# Patient Record
Sex: MALE | Race: WHITE | NOT HISPANIC OR LATINO | Employment: OTHER | URBAN - METROPOLITAN AREA
[De-identification: names, ages, dates, MRNs, and addresses within clinical notes are randomized per-mention and may not be internally consistent; named-entity substitution may affect disease eponyms.]

---

## 2017-02-14 ENCOUNTER — TELEPHONE (OUTPATIENT)
Dept: FAMILY MEDICINE | Facility: CLINIC | Age: 67
End: 2017-02-14

## 2017-02-14 NOTE — TELEPHONE ENCOUNTER
----- Message from Kenny Brannon sent at 2/14/2017  9:58 AM CST -----  Contact: pt's spouse  She's calling in regards to being worked into the dr's schedule on today, please advise, [phone] (cell)

## 2017-02-14 NOTE — TELEPHONE ENCOUNTER
Spoke to patient's wife and advised there are no available appointments today. He will come in tomorrow to see Dr. Colon.

## 2017-02-15 ENCOUNTER — OFFICE VISIT (OUTPATIENT)
Dept: FAMILY MEDICINE | Facility: CLINIC | Age: 67
End: 2017-02-15
Payer: COMMERCIAL

## 2017-02-15 VITALS
SYSTOLIC BLOOD PRESSURE: 107 MMHG | BODY MASS INDEX: 42.54 KG/M2 | TEMPERATURE: 99 F | HEART RATE: 55 BPM | HEIGHT: 69 IN | WEIGHT: 287.25 LBS | DIASTOLIC BLOOD PRESSURE: 59 MMHG

## 2017-02-15 DIAGNOSIS — Z86.010 HISTORY OF COLON POLYPS: ICD-10-CM

## 2017-02-15 DIAGNOSIS — J20.9 BRONCHITIS WITH BRONCHOSPASM: Primary | ICD-10-CM

## 2017-02-15 PROCEDURE — 99213 OFFICE O/P EST LOW 20 MIN: CPT | Mod: S$GLB,,, | Performed by: FAMILY MEDICINE

## 2017-02-15 PROCEDURE — 99999 PR PBB SHADOW E&M-EST. PATIENT-LVL III: CPT | Mod: PBBFAC,,, | Performed by: FAMILY MEDICINE

## 2017-02-15 PROCEDURE — 99213 OFFICE O/P EST LOW 20 MIN: CPT | Mod: PBBFAC,PO | Performed by: FAMILY MEDICINE

## 2017-02-15 RX ORDER — INSULIN GLARGINE 100 [IU]/ML
INJECTION, SOLUTION SUBCUTANEOUS
Qty: 45 ML | Refills: 3 | Status: SHIPPED | OUTPATIENT
Start: 2017-02-15 | End: 2017-02-16 | Stop reason: SDUPTHER

## 2017-02-15 RX ORDER — INSULIN GLARGINE 100 [IU]/ML
INJECTION, SOLUTION SUBCUTANEOUS
Qty: 45 ML | Refills: 3 | Status: SHIPPED | OUTPATIENT
Start: 2017-02-15 | End: 2017-02-15 | Stop reason: SDUPTHER

## 2017-02-15 RX ORDER — ALBUTEROL SULFATE 90 UG/1
2 AEROSOL, METERED RESPIRATORY (INHALATION) EVERY 6 HOURS PRN
Qty: 18 G | Refills: 1 | Status: SHIPPED | OUTPATIENT
Start: 2017-02-15 | End: 2017-05-31 | Stop reason: SDUPTHER

## 2017-02-15 RX ORDER — CEFACLOR 500 MG
500 CAPSULE ORAL 3 TIMES DAILY
Qty: 30 CAPSULE | Refills: 0 | Status: SHIPPED | OUTPATIENT
Start: 2017-02-15 | End: 2017-02-25

## 2017-02-15 RX ORDER — BENZONATATE 200 MG/1
200 CAPSULE ORAL 3 TIMES DAILY PRN
Qty: 30 CAPSULE | Refills: 0 | Status: SHIPPED | OUTPATIENT
Start: 2017-02-15 | End: 2017-05-31 | Stop reason: SDUPTHER

## 2017-02-15 NOTE — MR AVS SNAPSHOT
[facility], [place], operated [facility]  [address]  [place] [address]  Phone: [phone]  Fax: [phone]                  Alfredo Brennan   2/15/2017 2:40 PM   Office Visit    Description:  Male : 1950   Provider:  Ángel Colon MD   Department:  [facility] [place]           Reason for Visit     URI           Diagnoses this Visit        Comments    Bronchitis with bronchospasm    -  Primary     History of colon polyps                To Do List           Future Appointments        Provider Department Dept Phone    2017 1:30 PM [facility] CT1 LIMIT [address] [facility] [phone]    2017 8:00 AM LABORATORY, [place] [facility] [phone]    2017 1:30 PM Yaw Randolph MD [facility] - Urology [phone]    2017 1:20 PM Ramirez Gonzales MD [facility] - Pulmonary Services [phone]      Goals (5 Years of Data)     None       These Medications        Disp Refills Start End    benzonatate (TESSALON) 200 MG capsule 30 capsule 0 2/15/2017     Take 1 capsule (200 mg total) by mouth 3 (three) times daily as needed for Cough. - Oral    Pharmacy: [facility] [address] [place] [address] Ph #: [phone]       cefaclor (CECLOR) 500 mg Cap 30 capsule 0 2/15/2017 2017    Take 1 capsule (500 mg total) by mouth 3 (three) times daily. - Oral    Pharmacy: [facility] [address] [place] [address] Ph #: [phone]       albuterol 90 mcg/actuation inhaler 18 g 1 2/15/2017     Inhale 2 puffs into the lungs every 6 (six) hours as needed for Wheezing. - Inhalation    Pharmacy: [facility] [address] [place] [address] Ph #: [phone]       insulin glargine (LANTUS SOLOSTAR) 100 unit/mL (3 mL) InPn pen 45 mL 3 2/15/2017     INJECT 52 UNITS            SUBCUTANEOUSLY ONCE DAILY    Pharmacy: [facility] - [place], [place] - [address] AT Portal to Registered  [facility] #: [phone]         [facility]     [facility] On Call Nurse [facility] - 24/7 Assistance  Registered nurses in the [facility] On Call Center provide clinical advisement, health education, appointment booking, and other advisory services.  Call for this free service at [phone].             Medications           Message regarding Medications     Verify the changes and/or additions to your medication regime listed below are the same as discussed with your clinician today.  If any of these changes or additions are incorrect, please notify your healthcare provider.        START taking these NEW medications        Refills    benzonatate (TESSALON) 200 MG capsule 0    Sig: Take 1 capsule (200 mg total) by mouth 3 (three) times daily as needed for Cough.    Class: Normal    Route: Oral    cefaclor (CECLOR) 500 mg Cap 0    Sig: Take 1 capsule (500 mg total) by mouth 3 (three) times daily.    Class: Normal    Route: Oral    albuterol 90 mcg/actuation inhaler 1    Sig: Inhale 2 puffs into the lungs every 6 (six) hours as needed for Wheezing.    Class: Normal    Route: Inhalation      CHANGE how you are taking these medications     Start Taking Instead of    insulin glargine (LANTUS SOLOSTAR) 100 unit/mL (3 mL) InPn pen LANTUS SOLOSTAR 100 unit/mL (3 mL) InPn pen    Dosage:  INJECT 52 UNITS            SUBCUTANEOUSLY ONCE DAILY Dosage:  INJECT 52 UNITS            SUBCUTANEOUSLY ONCE DAILY    Reason for Change:  Reorder            Verify that the below list of medications is an accurate representation of the medications you are currently taking.  If none reported, the list may be blank. If incorrect, please contact your healthcare provider. Carry this list with you in case of emergency.           Current Medications     albuterol 90 mcg/actuation inhaler Inhale 2 puffs into the lungs every 6 (six) hours as needed for Wheezing.    amiodarone (PACERONE) 200 MG Tab Take 1 tablet (200 mg total) by mouth 2  "(two) times daily.    atorvastatin (LIPITOR) 80 MG tablet TAKE 1 TABLET DAILY    benzonatate (TESSALON) 100 MG capsule     benzonatate (TESSALON) 200 MG capsule Take 1 capsule (200 mg total) by mouth 3 (three) times daily as needed for Cough.    blood sugar diagnostic Strp Breeze Two    BREEZE 2 TEST STRIPS Strp USE ONE STRIP TO CHECK GLUCOSE 3 TO 4 TIMES DAILY AS NEEDED    calcitRIOL (ROCALTROL) 0.25 MCG Cap     carvedilol (COREG) 3.125 MG tablet Take half in am and take half in pm    cefaclor (CECLOR) 500 mg Cap Take 1 capsule (500 mg total) by mouth 3 (three) times daily.    dextromethorphan-guaifenesin (MUCINEX DM) 60-1,200 mg TM12 Take by mouth.    docusate sodium (STOOL SOFTENER) 100 MG capsule 3 (three) times daily as needed. Every day    famotidine (PEPCID) 20 MG tablet 2 (two) times daily. Every day    furosemide (LASIX) 40 MG tablet Take 40 mg by mouth once daily. Take one tablet in am, may take one tablet in pm if needed    insulin glargine (LANTUS SOLOSTAR) 100 unit/mL (3 mL) InPn pen INJECT 52 UNITS            SUBCUTANEOUSLY ONCE DAILY    insulin needles, disposable, (ADVOCATE PEN NEEDLES) 29 x 1/2 " Ndle use directed directed Every day.  Pen Needles for Lantus Solostar to use daily---31 gauge 8mm needles    LANCETS & BLOOD GLUCOSE STRIPS MISC qid    metolazone (ZAROXOLYN) 5 MG tablet Take 1 tablet (5 mg total) by mouth once daily.    nitroGLYCERIN (NITROSTAT) 0.4 MG SL tablet Place 0.4 mg under the tongue every 5 (five) minutes as needed for Chest pain.    PEN NEEDLE 31 gauge x 5/16" Ndle USE ONE SYRINGE EVERY DAY    ramipril (ALTACE) 2.5 MG capsule Take 2.5 mg by mouth once daily. TAKE ONE CAPSULE BY MOUTH ONCE DAILY    spironolactone (ALDACTONE) 25 MG tablet 1/2 Every day    XARELTO 15 mg Tab            Clinical Reference Information           Your Vitals Were     BP Pulse Temp Height Weight BMI    107/59 (BP Location: Right arm, Patient Position: Sitting, BP Method: Automatic) 55 98.7 °F (37.1 °C) " "(Oral) 5' 9" (1.753 m) 130.3 kg (287 lb 4.2 oz) 42.42 kg/m2      Blood Pressure          Most Recent Value    BP  (!)  107/59      Allergies as of 2/15/2017     Codeine    Fish Oil      Immunizations Administered on Date of Encounter - 2/15/2017     None      Orders Placed During Today's Visit      Normal Orders This Visit    Case request GI: COLONOSCOPY       Language Assistance Services     ATTENTION: Language assistance services are available, free of charge. Please call [phone].      ATENCIÓN: Si habla rosarioyelena, tiene a corral disposición servicios gratuitos de asistencia lingüística. Llame al [phone].     CHÚ Ý: N?u b?n nói Ti?ng Vi?t, có các d?ch v? h? tr? ngôn ng? mi?n phí dành cho b?n. G?i s? [phone].         [facility] complies with applicable Federal civil rights laws and does not discriminate on the basis of race, color, national origin, age, disability, or sex.        "

## 2017-02-15 NOTE — PROGRESS NOTES
CC:COUGH  HPI:This is a new problem.   Alfredo Brennan is a 67 y.o. male with a history of cough.  The current episode started in the past 3 days.   The problem has been gradually worsening.   Associated symptoms included nasal congestion, rhinorrhea, cough, dyspnea, wheezing, and he has had green phlegm.   Pertinent negatives include fever, chills   Treatments tried: mucinex has been used and this has provided no relief.   The patient's Health Maintenance was reviewed and the following appears to be due at this time:  Health Maintenance Due   Topic Date Due    Hepatitis C Screening  1950    TETANUS VACCINE  02/03/1967    Colonoscopy  02/09/2015    Influenza Vaccine  08/01/2016    Foot Exam  04/11/2017       The current allergy list that we have since it was last reconciled is as follows:  Review of patient's allergies indicates:   Allergen Reactions    Codeine      Other reaction(s): Hallucinations    Fish oil Rash and Itching     Outpatient Medications Prior to Visit   Medication Sig Dispense Refill    amiodarone (PACERONE) 200 MG Tab Take 1 tablet (200 mg total) by mouth 2 (two) times daily. 60 tablet 11    atorvastatin (LIPITOR) 80 MG tablet TAKE 1 TABLET DAILY 90 tablet 3    benzonatate (TESSALON) 100 MG capsule       blood sugar diagnostic Strp Breeze Two      BREEZE 2 TEST STRIPS Strp USE ONE STRIP TO CHECK GLUCOSE 3 TO 4 TIMES DAILY AS NEEDED 100 strip 11    calcitRIOL (ROCALTROL) 0.25 MCG Cap       carvedilol (COREG) 3.125 MG tablet Take half in am and take half in pm      dextromethorphan-guaifenesin (MUCINEX DM) 60-1,200 mg TM12 Take by mouth.      docusate sodium (STOOL SOFTENER) 100 MG capsule 3 (three) times daily as needed. Every day      famotidine (PEPCID) 20 MG tablet 2 (two) times daily. Every day      furosemide (LASIX) 40 MG tablet Take 40 mg by mouth once daily. Take one tablet in am, may take one tablet in pm if needed      insulin needles, disposable, (ADVOCATE PEN  "NEEDLES) 29 x 1/2 " Ndle use directed directed Every day.  Pen Needles for Lantus Solostar to use daily---31 gauge 8mm needles      LANCETS & BLOOD GLUCOSE STRIPS MISC qid      LANTUS SOLOSTAR 100 unit/mL (3 mL) InPn pen INJECT 52 UNITS            SUBCUTANEOUSLY ONCE DAILY 45 mL 3    metolazone (ZAROXOLYN) 5 MG tablet Take 1 tablet (5 mg total) by mouth once daily. 30 tablet 11    nitroGLYCERIN (NITROSTAT) 0.4 MG SL tablet Place 0.4 mg under the tongue every 5 (five) minutes as needed for Chest pain.      PEN NEEDLE 31 gauge x 5/16" Ndle USE ONE SYRINGE EVERY  each 11    ramipril (ALTACE) 2.5 MG capsule Take 2.5 mg by mouth once daily. TAKE ONE CAPSULE BY MOUTH ONCE DAILY      spironolactone (ALDACTONE) 25 MG tablet 1/2 Every day      XARELTO 15 mg Tab        No facility-administered medications prior to visit.         Physical Exam   Visit Vitals    BP (!) 107/59 (BP Location: Right arm, Patient Position: Sitting, BP Method: Automatic)    Pulse (!) 55    Temp 98.7 °F (37.1 °C) (Oral)    Ht 5' 9" (1.753 m)    Wt 130.3 kg (287 lb 4.2 oz)    BMI 42.42 kg/m2     Constitutional: The patient appears well-developed and well-nourished.   Head: Normocephalic and atraumatic.   Right Ear: Tympanic membrane and ear canal normal. No drainage, swelling or tenderness. Tympanic membrane is not injected, not erythematous and not bulging.   Left Ear: Ear canal normal. No drainage, swelling or tenderness. Tympanic membrane is not injected, not erythematous and not bulging.   Nose:  No mucosal edema or rhinitis is noted.      Mouth/Throat: Uvula is midline.  Posterior oropharynx is not erythematous. No oropharyngeal exudate is noted.    Eyes: Conjunctivae normal and lids are normal. Pupils are equal, round, and reactive to light. Right eye exhibits no discharge. Left eye exhibits no discharge. Right eye exhibits normal extraocular motion. Left eye exhibits normal extraocular motion.   Neck: Trachea normal and " normal range of motion. Neck supple. No tracheal tenderness present. No mass and no thyromegaly present.   Cardiovascular: Normal rate, regular rhythm, S1 normal, S2 normal and normal heart sounds.  Exam reveals no gallop, no S3, no S4 and no friction rub.    No murmur heard.  Pulmonary/Chest: Effort normal and breath sounds are coarse. No stridor. Not tachypneic. No respiratory distress. The patient has wheezes. The patient has no rhonchi. The patient has no rales.   FOOT EXAM:  Skin: The patient is not diaphoretic.   Protective Sensation (w/ 10 gram monofilament):  Right: Intact  Left: Intact    Visual Inspection:  Normal -  Bilateral, Nails Intact - without Evidence of Foot Deformity- Neither, Ulceration -  Neither, Blister -  Neither, Skin Breakdown -  Neither, Erythema -  Neither, Increased Warmth -  Neither, Callus -  Neither, Dry Skin -  Neither and Onychomycosis -  Neither    Pedal Pulses:   Right: Present  Left: Present    Posterior tibialis:   Right:Present  Left: Present      Encounter Diagnoses   Name Primary?    Bronchitis with bronchospasm Yes    History of colon polyps        PLAN:    I am having Mr. Brennan maintain his carvedilol, blood sugar diagnostic, famotidine, LANCETS & BLOOD GLUCOSE STRIPS MISC, furosemide, spironolactone, docusate sodium, (pen needle, diabetic), nitroGLYCERIN, dextromethorphan-guaifenesin, ramipril, metOLazone, amiodarone, atorvastatin, benzonatate, calcitRIOL, XARELTO, LANTUS SOLOSTAR, BREEZE 2 TEST STRIPS, and PEN NEEDLE.  There are no diagnoses linked to this encounter.     No orders of the defined types were placed in this encounter.    Case request GI: COLONOSCOPY     Location: [facility] ENDO   Procedure: COLONOSCOPY, Provider: Ángel Colon MD, Laterality: N/A, Anesthesia Type: Local MAC   Cancel             RTC if symptoms are worsening or changing significantly or if not improved by the end of therapy.

## 2017-02-16 RX ORDER — INSULIN GLARGINE 100 [IU]/ML
INJECTION, SOLUTION SUBCUTANEOUS
Qty: 45 ML | Refills: 6 | Status: SHIPPED | OUTPATIENT
Start: 2017-02-16 | End: 2018-01-08 | Stop reason: SDUPTHER

## 2017-02-16 NOTE — TELEPHONE ENCOUNTER
----- Message from Kay Mejia sent at 2/15/2017  3:59 PM CST -----  Contact: Papi/wife  Papi called and stated it's too difficult for her to explain to me and then to explain to the nurse so she just wants to speak with the nurse.    She can be contacted at [phone].    Thanks,  Kay

## 2017-02-16 NOTE — TELEPHONE ENCOUNTER
Spoke to grace's wife, and pended the prescription requested to be sent to his mail order pharmacy.

## 2017-02-23 ENCOUNTER — TELEPHONE (OUTPATIENT)
Dept: GASTROENTEROLOGY | Facility: CLINIC | Age: 67
End: 2017-02-23

## 2017-02-24 ENCOUNTER — TELEPHONE (OUTPATIENT)
Dept: FAMILY MEDICINE | Facility: CLINIC | Age: 67
End: 2017-02-24

## 2017-02-24 RX ORDER — METHYLPREDNISOLONE 4 MG/1
TABLET ORAL
Qty: 21 TABLET | Refills: 0 | Status: SHIPPED | OUTPATIENT
Start: 2017-02-24 | End: 2017-05-31

## 2017-02-24 NOTE — TELEPHONE ENCOUNTER
I started allegra otc and the medrol.    No orders of the defined types were placed in this encounter.        Medications Ordered This Encounter      methylPREDNISolone (MEDROL DOSEPACK) 4 mg tablet          Sig: follow package directions          Dispense:  21 tablet          Refill:  0

## 2017-02-24 NOTE — TELEPHONE ENCOUNTER
----- Message from Ashley Tom sent at 2/24/2017 10:00 AM CST -----  Contact: pt wife-Evy  Would like the patient to be worked in for an appt on today. States patient was recently seen in the office for a respiratory infection and is not getting better. States she hate the system where she can't speak with anyone in [place]. Please adv/call [phone] or [phone].//thanks cw

## 2017-03-06 ENCOUNTER — PATIENT MESSAGE (OUTPATIENT)
Dept: FAMILY MEDICINE | Facility: CLINIC | Age: 67
End: 2017-03-06

## 2017-03-06 LAB
ALBUMIN: 3.9
ALT SERPL-CCNC: 17 U/L
AST SERPL-CCNC: 14 U/L
CALCIUM SERPL-MCNC: 8.8 MG/DL
CHLORIDE: 101
CREAT SERPL-MCNC: 2 MG/DL
GLUCOSE: 118
POTASSIUM: 4
SODIUM: 137
TOTAL PROTEIN: 6.7 G/DL (ref 6.4–8.2)
UREA NITROGEN (BUN): 33

## 2017-03-24 ENCOUNTER — PATIENT OUTREACH (OUTPATIENT)
Dept: ADMINISTRATIVE | Facility: HOSPITAL | Age: 67
End: 2017-03-24

## 2017-04-03 RX ORDER — ATORVASTATIN CALCIUM 80 MG/1
80 TABLET, FILM COATED ORAL DAILY
Qty: 90 TABLET | Refills: 0 | Status: SHIPPED | OUTPATIENT
Start: 2017-04-03 | End: 2017-04-24 | Stop reason: SDUPTHER

## 2017-04-20 ENCOUNTER — HOSPITAL ENCOUNTER (OUTPATIENT)
Dept: RADIOLOGY | Facility: HOSPITAL | Age: 67
Discharge: HOME | End: 2017-04-20
Attending: UROLOGY
Payer: COMMERCIAL

## 2017-04-20 DIAGNOSIS — D17.79 MYELOLIPOMA OF LEFT ADRENAL GLAND: ICD-10-CM

## 2017-04-20 PROCEDURE — 74176 CT ABD & PELVIS W/O CONTRAST: CPT | Mod: TC,PO

## 2017-04-20 PROCEDURE — 74176 CT ABD & PELVIS W/O CONTRAST: CPT | Mod: 26,,, | Performed by: RADIOLOGY

## 2017-04-21 ENCOUNTER — LAB VISIT (OUTPATIENT)
Dept: LAB | Facility: HOSPITAL | Age: 67
End: 2017-04-21
Attending: FAMILY MEDICINE
Payer: MEDICARE

## 2017-04-21 DIAGNOSIS — E78.5 HYPERLIPIDEMIA ASSOCIATED WITH TYPE 2 DIABETES MELLITUS: ICD-10-CM

## 2017-04-21 DIAGNOSIS — E11.69 HYPERLIPIDEMIA ASSOCIATED WITH TYPE 2 DIABETES MELLITUS: ICD-10-CM

## 2017-04-21 LAB
ALBUMIN SERPL BCP-MCNC: 3.6 G/DL
ALP SERPL-CCNC: 68 U/L
ALT SERPL W/O P-5'-P-CCNC: 13 U/L
AST SERPL-CCNC: 18 U/L
BILIRUB DIRECT SERPL-MCNC: 0.3 MG/DL
BILIRUB SERPL-MCNC: 0.6 MG/DL
CHOLEST/HDLC SERPL: 3.3 {RATIO}
HDL/CHOLESTEROL RATIO: 30.4 %
HDLC SERPL-MCNC: 115 MG/DL
HDLC SERPL-MCNC: 35 MG/DL
LDLC SERPL CALC-MCNC: 60.2 MG/DL
NONHDLC SERPL-MCNC: 80 MG/DL
PROT SERPL-MCNC: 7.1 G/DL
TRIGL SERPL-MCNC: 99 MG/DL

## 2017-04-21 PROCEDURE — 80061 LIPID PANEL: CPT

## 2017-04-21 PROCEDURE — 80076 HEPATIC FUNCTION PANEL: CPT

## 2017-04-21 PROCEDURE — 36415 COLL VENOUS BLD VENIPUNCTURE: CPT | Mod: PO

## 2017-04-24 RX ORDER — ATORVASTATIN CALCIUM 80 MG/1
80 TABLET, FILM COATED ORAL DAILY
Qty: 90 TABLET | Refills: 0 | Status: SHIPPED | OUTPATIENT
Start: 2017-04-24 | End: 2017-07-10

## 2017-04-24 NOTE — TELEPHONE ENCOUNTER
----- Message from Ángel Colon MD sent at 4/23/2017  1:00 PM CDT -----  The patient's lipid and liver are at a controlled target on the labs that I reviewed.   Repeat a LIPID AND LIVER PROFILE in 12 months.  Refill the lipid medications for a year for the patient.

## 2017-05-25 DIAGNOSIS — E11.9 TYPE 2 DIABETES MELLITUS WITHOUT COMPLICATION: ICD-10-CM

## 2017-05-31 ENCOUNTER — LAB VISIT (OUTPATIENT)
Dept: LAB | Facility: HOSPITAL | Age: 67
End: 2017-05-31
Attending: FAMILY MEDICINE
Payer: COMMERCIAL

## 2017-05-31 ENCOUNTER — OFFICE VISIT (OUTPATIENT)
Dept: FAMILY MEDICINE | Facility: CLINIC | Age: 67
End: 2017-05-31
Payer: COMMERCIAL

## 2017-05-31 VITALS
SYSTOLIC BLOOD PRESSURE: 90 MMHG | HEIGHT: 69 IN | DIASTOLIC BLOOD PRESSURE: 52 MMHG | BODY MASS INDEX: 42.22 KG/M2 | TEMPERATURE: 98 F | HEART RATE: 45 BPM | WEIGHT: 285.06 LBS

## 2017-05-31 DIAGNOSIS — Z79.4 TYPE 2 DIABETES MELLITUS WITHOUT COMPLICATION, WITH LONG-TERM CURRENT USE OF INSULIN: ICD-10-CM

## 2017-05-31 DIAGNOSIS — E11.9 TYPE 2 DIABETES MELLITUS WITHOUT COMPLICATION, WITH LONG-TERM CURRENT USE OF INSULIN: ICD-10-CM

## 2017-05-31 DIAGNOSIS — Z86.010 HISTORY OF COLON POLYPS: ICD-10-CM

## 2017-05-31 DIAGNOSIS — Z11.59 NEED FOR HEPATITIS C SCREENING TEST: ICD-10-CM

## 2017-05-31 DIAGNOSIS — J20.9 BRONCHITIS WITH BRONCHOSPASM: Primary | ICD-10-CM

## 2017-05-31 DIAGNOSIS — J06.9 VIRAL URI WITH COUGH: ICD-10-CM

## 2017-05-31 PROCEDURE — 99213 OFFICE O/P EST LOW 20 MIN: CPT | Mod: S$GLB,,, | Performed by: FAMILY MEDICINE

## 2017-05-31 PROCEDURE — 83036 HEMOGLOBIN GLYCOSYLATED A1C: CPT

## 2017-05-31 PROCEDURE — 86803 HEPATITIS C AB TEST: CPT

## 2017-05-31 PROCEDURE — 99999 PR PBB SHADOW E&M-EST. PATIENT-LVL III: CPT | Mod: PBBFAC,,, | Performed by: FAMILY MEDICINE

## 2017-05-31 PROCEDURE — 36415 COLL VENOUS BLD VENIPUNCTURE: CPT | Mod: PO

## 2017-05-31 RX ORDER — ALBUTEROL SULFATE 90 UG/1
2 AEROSOL, METERED RESPIRATORY (INHALATION) EVERY 6 HOURS PRN
Qty: 18 G | Refills: 1 | Status: SHIPPED | OUTPATIENT
Start: 2017-05-31 | End: 2019-01-28 | Stop reason: SDUPTHER

## 2017-05-31 RX ORDER — MINERAL OIL
180 ENEMA (ML) RECTAL DAILY
Qty: 10 TABLET | Refills: 0 | Status: SHIPPED | OUTPATIENT
Start: 2017-05-31 | End: 2017-12-21

## 2017-05-31 RX ORDER — METHYLPREDNISOLONE 4 MG/1
TABLET ORAL
Qty: 21 TABLET | Refills: 0 | Status: SHIPPED | OUTPATIENT
Start: 2017-05-31 | End: 2017-06-04 | Stop reason: SDUPTHER

## 2017-05-31 RX ORDER — BENZONATATE 200 MG/1
200 CAPSULE ORAL 3 TIMES DAILY PRN
Qty: 30 CAPSULE | Refills: 0 | Status: SHIPPED | OUTPATIENT
Start: 2017-05-31 | End: 2018-01-08

## 2017-05-31 NOTE — PROGRESS NOTES
"Subjective:      Patient ID: Alfredo Brennan is a 67 y.o. male.    Chief Complaint: URI and Generalized Body Aches    HPIhe has had sneezing and cough and he has had phlegm with "snot" coming out of the nose. This came after he went to a friend's home who smoked and had dogs and he has been doing this since then.  He started with this 2 days ago.  He has not had fever ntoed.  He has had body aches.  He has had no meds for this until last night when he started mucinex.  He is also due for an a1c at this time due to the diabetes.    He has not had his colonoscopy and he is due for it now as he had a polyp in the past.    Review of Systems    Objective:   BP (!) 90/52 (BP Location: Right arm, Patient Position: Sitting, BP Method: Automatic)   Pulse (!) 45   Temp 98.4 °F (36.9 °C) (Oral)   Ht 5' 9" (1.753 m)   Wt 129.3 kg (285 lb 0.9 oz)   BMI 42.10 kg/m²     Physical Exam   Constitutional: He appears well-developed and well-nourished. No distress.   HENT:   Head: Normocephalic and atraumatic.   Right Ear: External ear normal.   Left Ear: External ear normal.   Nose: Nose normal.   Mouth/Throat: Oropharynx is clear and moist. No oropharyngeal exudate.   Eyes: Conjunctivae and EOM are normal. Pupils are equal, round, and reactive to light. Right eye exhibits no discharge. Left eye exhibits no discharge. No scleral icterus.   Neck: Normal range of motion. Neck supple. No thyromegaly present.   Cardiovascular: Normal rate, regular rhythm, normal heart sounds and intact distal pulses.  Exam reveals no gallop and no friction rub.    No murmur heard.  Pulmonary/Chest: Effort normal. No stridor. No respiratory distress. He has wheezes. He has no rales. He exhibits no tenderness.   Lymphadenopathy:     He has no cervical adenopathy.   Skin: He is not diaphoretic.       Assessment:     1. Bronchitis with bronchospasm    2. Type 2 diabetes mellitus without complication, with long-term current use of insulin    3. Need for " hepatitis C screening test    4. Viral URI with cough    5. History of colon polyps        Plan:   Alfredo was seen today for uri and generalized body aches.    Diagnoses and all orders for this visit:    Bronchitis with bronchospasm  -     albuterol 90 mcg/actuation inhaler; Inhale 2 puffs into the lungs every 6 (six) hours as needed for Wheezing.  -     benzonatate (TESSALON) 200 MG capsule; Take 1 capsule (200 mg total) by mouth 3 (three) times daily as needed for Cough.  -     methylPREDNISolone (MEDROL DOSEPACK) 4 mg tablet; follow package directions    Type 2 diabetes mellitus without complication, with long-term current use of insulin  -     Hemoglobin A1c; Future    Need for hepatitis C screening test  -     Hepatitis C antibody; Future    Viral URI with cough    History of colon polyps  -     Case request GI: COLONOSCOPY    Other orders  -     fexofenadine (ALLEGRA ALLERGY) 180 MG tablet; Take 1 tablet (180 mg total) by mouth once daily.

## 2017-06-01 LAB
ESTIMATED AVG GLUCOSE: 131 MG/DL
HBA1C MFR BLD HPLC: 6.2 %
HCV AB SERPL QL IA: NEGATIVE

## 2017-06-02 ENCOUNTER — OFFICE VISIT (OUTPATIENT)
Dept: UROLOGY | Facility: CLINIC | Age: 67
End: 2017-06-02
Attending: UROLOGY
Payer: COMMERCIAL

## 2017-06-02 VITALS
HEART RATE: 59 BPM | SYSTOLIC BLOOD PRESSURE: 111 MMHG | BODY MASS INDEX: 42.21 KG/M2 | WEIGHT: 285 LBS | HEIGHT: 69 IN | DIASTOLIC BLOOD PRESSURE: 66 MMHG

## 2017-06-02 DIAGNOSIS — D17.79 MYELOLIPOMA OF LEFT ADRENAL GLAND: ICD-10-CM

## 2017-06-02 DIAGNOSIS — N20.0 RENAL STONES: Primary | ICD-10-CM

## 2017-06-02 LAB
BILIRUB SERPL-MCNC: NORMAL MG/DL
BLOOD URINE, POC: NORMAL
COLOR, POC UA: YELLOW
GLUCOSE UR QL STRIP: NORMAL
KETONES UR QL STRIP: NORMAL
LEUKOCYTE ESTERASE URINE, POC: NORMAL
NITRITE, POC UA: NORMAL
PH, POC UA: 5
PROTEIN, POC: NORMAL
SPECIFIC GRAVITY, POC UA: 1.01
UROBILINOGEN, POC UA: NORMAL

## 2017-06-02 PROCEDURE — 99214 OFFICE O/P EST MOD 30 MIN: CPT | Mod: 25,S$GLB,, | Performed by: UROLOGY

## 2017-06-02 PROCEDURE — 81002 URINALYSIS NONAUTO W/O SCOPE: CPT | Mod: S$GLB,,, | Performed by: UROLOGY

## 2017-06-02 PROCEDURE — 1126F AMNT PAIN NOTED NONE PRSNT: CPT | Mod: S$GLB,,, | Performed by: UROLOGY

## 2017-06-02 PROCEDURE — 99999 PR PBB SHADOW E&M-EST. PATIENT-LVL IV: CPT | Mod: PBBFAC,,, | Performed by: UROLOGY

## 2017-06-02 PROCEDURE — 1159F MED LIST DOCD IN RCRD: CPT | Mod: S$GLB,,, | Performed by: UROLOGY

## 2017-06-04 ENCOUNTER — NURSE TRIAGE (OUTPATIENT)
Dept: ADMINISTRATIVE | Facility: CLINIC | Age: 67
End: 2017-06-04

## 2017-06-04 DIAGNOSIS — J20.9 BRONCHITIS WITH BRONCHOSPASM: ICD-10-CM

## 2017-06-04 RX ORDER — LEVOFLOXACIN 500 MG/1
500 TABLET, FILM COATED ORAL DAILY
Qty: 5 TABLET | Refills: 0 | Status: SHIPPED | OUTPATIENT
Start: 2017-06-04 | End: 2017-06-05

## 2017-06-04 RX ORDER — LEVOFLOXACIN 500 MG/1
500 TABLET, FILM COATED ORAL DAILY
Qty: 5 TABLET | Refills: 0 | Status: SHIPPED | OUTPATIENT
Start: 2017-06-04 | End: 2017-06-04 | Stop reason: SDUPTHER

## 2017-06-04 RX ORDER — CEFACLOR 500 MG
CAPSULE ORAL
Qty: 30 CAPSULE | Refills: 0 | Status: SHIPPED | OUTPATIENT
Start: 2017-06-04 | End: 2017-12-21

## 2017-06-04 RX ORDER — AZITHROMYCIN 250 MG/1
TABLET, FILM COATED ORAL
Qty: 6 TABLET | Refills: 0 | Status: SHIPPED | OUTPATIENT
Start: 2017-06-04 | End: 2017-06-05

## 2017-06-04 RX ORDER — METHYLPREDNISOLONE 4 MG/1
TABLET ORAL
Qty: 21 TABLET | Refills: 0 | Status: SHIPPED | OUTPATIENT
Start: 2017-06-04 | End: 2017-09-25

## 2017-06-04 NOTE — TELEPHONE ENCOUNTER
Spoke with MD on call. Ceclor 500 mg TID #30. NR. Called in at 1245pm to ajay. Pt notified. Call back with questions.     Reason for Disposition   Caller has medication question about med not prescribed by PCP and triager unable to answer question (e.g., compatibility with other med, storage)    Protocols used: ST MEDICATION QUESTION CALL-A-AH

## 2017-06-04 NOTE — TELEPHONE ENCOUNTER
"Seen in office 5/31. Using meds. Not better. Coughing up yellow green mucous. Sick since 5/27. No sob. Some swelling on ankle normal. finished medrol dose pack.     Reason for Disposition   [1] Fever > 100.5 F (38.1 C) AND [2] diabetes mellitus or weak immune system (e.g., HIV positive, cancer chemo, splenectomy, organ transplant, chronic steroids)    Answer Assessment - Initial Assessment Questions  1. LOCATION: "Where does it hurt?"      No pain   2. ONSET: "When did the sinus pain start?"  (e.g., hours, days)      5/27  3. SEVERITY: "How bad is the pain?"   (Scale 1-10; mild, moderate or severe)    - MILD (1-3): doesn't interfere with normal activities     - MODERATE (4-7): interferes with normal activities (e.g., work or school) or awakens from sleep    - SEVERE (8-10): excruciating pain and patient unable to do any normal activities        Afeb, no sinus pain, tremendous amount of coughing and phlegm. Using mdi q 4 hours   4. RECURRENT SYMPTOM: "Have you ever had sinus problems before?" If so, ask: "When was the last time?" and "What happened that time?"      resp pblms   5. NASAL CONGESTION: "Is the nose blocked?" If so, ask, "Can you open it or must you breathe through the mouth?"    Congested   6. NASAL DISCHARGE: "Do you have discharge from your nose?" If so ask, "What color?"     Yellow green   7. FEVER: "Do you have a fever?" If so, ask: "What is it, how was it measured, and when did it start?"      afeb   8. OTHER SYMPTOMS: "Do you have any other symptoms?" (e.g., sore throat, cough, earache, difficulty breathing)     Cough    Protocols used:  SINUS PAIN AND CONGESTION--  pharm [facility]. Spoke with MD on call Start pt on levaquin 500 mg one daily x 5 day. #5 NR.  Repeat medrol pose mary lou. Not enough steroids on board. Follow up with office if no better on thurs. Called in rx at 1136am - spoke with ajay. Pt notified.Call back with questions.     "

## 2017-06-04 NOTE — TELEPHONE ENCOUNTER
Spouse called stating that pharm states new meds interacts with his med. Pt is on amidorone with levaquin can cause arrhthymias  , levaquin with medrol dose pack is risk of tendon rupture.spoke with MD on call  Change to zpak and medrol.-spoke with ajay at 1213pm. Macrolide and quinolones still interact with amiodarone. LM for MD x 2 this am. Going to play a [employer] today. Walking out the door now to go to [employer]. Wife phone number [phone].     Reason for Disposition   Caller has medication question about med not prescribed by PCP and triager unable to answer question (e.g., compatibility with other med, storage)    Protocols used: ST MEDICATION QUESTION CALL-A-

## 2017-06-13 ENCOUNTER — TELEPHONE (OUTPATIENT)
Dept: GASTROENTEROLOGY | Facility: CLINIC | Age: 67
End: 2017-06-13

## 2017-07-05 ENCOUNTER — TELEPHONE (OUTPATIENT)
Dept: FAMILY MEDICINE | Facility: CLINIC | Age: 67
End: 2017-07-05

## 2017-07-05 NOTE — TELEPHONE ENCOUNTER
----- Message from Nevin Ricardo sent at 7/5/2017  3:50 PM CDT -----  Contact: self  Colon booked 7/26/17. Please call in suprep and trudi to [facility]. Please advise

## 2017-07-10 RX ORDER — ATORVASTATIN CALCIUM 80 MG/1
TABLET, FILM COATED ORAL
Qty: 90 TABLET | Refills: 0 | Status: SHIPPED | OUTPATIENT
Start: 2017-07-10 | End: 2017-10-12 | Stop reason: SDUPTHER

## 2017-07-10 RX ORDER — SODIUM, POTASSIUM,MAG SULFATES 17.5-3.13G
SOLUTION, RECONSTITUTED, ORAL ORAL
Qty: 354 ML | Refills: 0 | Status: SHIPPED | OUTPATIENT
Start: 2017-07-10 | End: 2018-01-08

## 2017-07-10 RX ORDER — PROMETHAZINE HYDROCHLORIDE 25 MG/1
25 TABLET ORAL EVERY 6 HOURS PRN
Qty: 6 TABLET | Refills: 0 | Status: SHIPPED | OUTPATIENT
Start: 2017-07-10 | End: 2017-09-25

## 2017-07-10 NOTE — TELEPHONE ENCOUNTER
I have signed for the following orders AND/OR meds.  Please call the patient and ask the patient to schedule the testing AND/OR inform about any medications that were sent.      No orders of the defined types were placed in this encounter.        Medications Ordered This Encounter      promethazine (PHENERGAN) 25 MG tablet          Sig: Take 1 tablet (25 mg total) by mouth every 6 (six) hours as needed for Nausea.          Dispense:  6 tablet          Refill:  0      sodium,potassium,mag sulfates (SUPREP BOWEL PREP KIT) 17.5-3.13-1.6 gram SolR          Sig: Take as instructed on prep sheet          Dispense:  354 mL          Refill:  0

## 2017-07-25 ENCOUNTER — TELEPHONE (OUTPATIENT)
Dept: FAMILY MEDICINE | Facility: CLINIC | Age: 67
End: 2017-07-25

## 2017-07-25 NOTE — TELEPHONE ENCOUNTER
----- Message from Sarahi Tom sent at 7/25/2017  2:47 PM CDT -----  Contact: pt  The pt request a return call, no additional info given, pt can be reached at [phone]///[id number]

## 2017-07-25 NOTE — TELEPHONE ENCOUNTER
Called pt's home number, left message to return call and also called mobile number, no answer or voicemail.

## 2017-07-25 NOTE — TELEPHONE ENCOUNTER
----- Message from Syeda Thompson sent at 7/25/2017 11:05 AM CDT -----  Contact: [facility]   Pt is calling nurse staff regarding patient is schedule for colonoscopy. [facility] is unable to reach patient for per/op. thanks

## 2017-07-25 NOTE — TELEPHONE ENCOUNTER
----- Message from Syeda Thompson sent at 7/25/2017 11:05 AM CDT -----  Contact: [facility]   Pt is calling nurse staff regarding patient is schedule for colonoscopy. [facility] is unable to reach patient for per/op. thanks

## 2017-08-15 ENCOUNTER — PATIENT MESSAGE (OUTPATIENT)
Dept: FAMILY MEDICINE | Facility: CLINIC | Age: 67
End: 2017-08-15

## 2017-08-29 ENCOUNTER — TELEPHONE (OUTPATIENT)
Dept: FAMILY MEDICINE | Facility: CLINIC | Age: 67
End: 2017-08-29

## 2017-08-29 NOTE — TELEPHONE ENCOUNTER
Spoke with pt's wife, advised of Dr. Colon's recommendations. She states they need records of treatment provided by Dr. Colon(lab results and progress notes). She states we should be receiving something from the insurance company. Advised when we receive the request by the insurance company we will send the requested documentation.

## 2017-08-29 NOTE — TELEPHONE ENCOUNTER
Patient wife states [facility] will be requesting records from [facility] regarding his hear attack . Do you recall if  you received this yet ?

## 2017-08-29 NOTE — TELEPHONE ENCOUNTER
----- Message from Chandrika Zee sent at 8/29/2017  1:08 PM CDT -----  Contact: Evy/spouse  Evy is requesting to speak to the nurse regarding pt's records. Pls call Evy back at [phone].

## 2017-08-29 NOTE — TELEPHONE ENCOUNTER
They need to request this from [facility] as we don't have the records and we can't send other institutions' records out.  They need to request this from [facility] Information management dept.  Have them call [phone] and ask for medical records.

## 2017-09-25 ENCOUNTER — OFFICE VISIT (OUTPATIENT)
Dept: PULMONOLOGY | Facility: CLINIC | Age: 67
End: 2017-09-25
Payer: COMMERCIAL

## 2017-09-25 VITALS
HEIGHT: 69 IN | RESPIRATION RATE: 18 BRPM | BODY MASS INDEX: 42.03 KG/M2 | DIASTOLIC BLOOD PRESSURE: 62 MMHG | HEART RATE: 50 BPM | OXYGEN SATURATION: 95 % | SYSTOLIC BLOOD PRESSURE: 118 MMHG | WEIGHT: 283.75 LBS

## 2017-09-25 DIAGNOSIS — E66.01 MORBID OBESITY WITH BMI OF 40.0-44.9, ADULT: Chronic | ICD-10-CM

## 2017-09-25 DIAGNOSIS — G47.33 OSA ON CPAP: Primary | Chronic | ICD-10-CM

## 2017-09-25 PROCEDURE — 1159F MED LIST DOCD IN RCRD: CPT | Mod: S$GLB,,, | Performed by: INTERNAL MEDICINE

## 2017-09-25 PROCEDURE — 3008F BODY MASS INDEX DOCD: CPT | Mod: S$GLB,,, | Performed by: INTERNAL MEDICINE

## 2017-09-25 PROCEDURE — 99999 PR PBB SHADOW E&M-EST. PATIENT-LVL III: CPT | Mod: PBBFAC,,, | Performed by: INTERNAL MEDICINE

## 2017-09-25 PROCEDURE — 99214 OFFICE O/P EST MOD 30 MIN: CPT | Mod: S$GLB,,, | Performed by: INTERNAL MEDICINE

## 2017-09-25 PROCEDURE — 3078F DIAST BP <80 MM HG: CPT | Mod: S$GLB,,, | Performed by: INTERNAL MEDICINE

## 2017-09-25 PROCEDURE — 3074F SYST BP LT 130 MM HG: CPT | Mod: S$GLB,,, | Performed by: INTERNAL MEDICINE

## 2017-09-25 NOTE — ASSESSMENT & PLAN NOTE
Continue CPAP of  9.5 CMWP. (DME - [facility])     Discussed therapeutic goals for positive airway pressure therapy(CPAP or BiPAP): Ideal is usage 100% of nights for 6 - 8 hours per night. Minimum usage is 70% of night for at least 4 hours per night used. Pateint expressed understanding. All Questions answered.    CPAP supplies renewed.

## 2017-09-25 NOTE — PATIENT INSTRUCTIONS
Obstructive Sleep Apnea  Obstructive sleep apnea is a condition that causes your air passages to become narrowed or blocked during sleep. As a result, breathing stops for short periods. Your body wakes up enough for breathing to begin again, though you don't remember it. The cycle of stopped breathing and brief awakenings can repeat dozens of times a night. This prevents the body from getting to the deeper stages of sleep that are needed for good rest and may cause your body's oxygen level to fall.  Signs of sleep apnea include loud snoring, noisy breathing, and gasping sounds during sleep. Daytime symptoms include waking up tired after a full night's sleep, waking up with headaches, feeling very sleepy or falling asleep during the day, and having problems with memory or concentration.  Risk factors for sleep apnea include:  · Being overweight  · Being a man, or a woman in menopause  · Smoking  · Using alcohol or sedating medicines  · Having enlarged structures in the nose or throat  Home care  Lifestyle changes that can help treat snoring and sleep apnea include the following:  · If you are overweight, lose weight. Talk to your healthcare provider about a weight-loss plan for you.  · Avoid alcohol for 3 to 4 hours before bedtime. Avoid sedating medications. Ask your healthcare provider about the medicines you take.  · If you smoke, talk to your healthcare provider about ways to quit.  · Sleep on your side. This can help prevent gravity from pulling relaxed throat tissues into your breathing passages.  · If you have allergies or sinus problems that block your nose, ask your healthcare provider for help.  Follow-up care  Follow up with your healthcare provider, or as advised. A diagnosis of sleep apnea is made with a sleep study. Your healthcare provider can tell you more about this test.  When to seek medical advice  Sleep apnea can make you more likely to have certain health problems. These include high blood  pressure, heart attack, stroke, and sexual dysfunction. If you have sleep apnea, talk to your healthcare provider about the best treatments for you.  Date Last Reviewed: 4/1/2017  © 0402-8215 [employer]. [address] [place] [address], [place] [address]. All rights reserved. This information is not intended as a substitute for professional medical care. Always follow your healthcare professional's instructions.

## 2017-09-25 NOTE — PROGRESS NOTES
Subjective:      Patient ID: Alfredo Brennan is a 67 y.o. male.  Patient Active Problem List   Diagnosis    DM (diabetes mellitus)    Hyperlipidemia associated with type 2 diabetes mellitus    CAD (coronary artery disease)    Left ventricular systolic dysfunction    Hypertension associated with diabetes    Cardiomyopathy due to hypertension, with heart failure    Ischemic dilated cardiomyopathy    Dyspnea and respiratory abnormalities    Morbid obesity with BMI of 40.0-44.9, adult    SERGEY on CPAP     Problem list has been reviewed.    Chief Complaint: SERGEY ON CPAP    HPI: He is  here for follow up for SERGEY and CPAP complaince assessment. he  is on CPAP  of  9.5 CMWP . Patient denies snoring, headaches, excessive daytime sleepiness.  He definitely thinks PAP is beneficial to his health and he wants to continue with PAP therapy.    [place]ay Sleepiness Scale   EPWORTH SLEEPINESS SCALE 9/25/2017 9/12/2016 9/10/2015 7/2/2015   Sitting and reading 0 1 1 1   Watching TV 0 1 0 1   Sitting, inactive in a public place (e.g. a theatre or a meeting) 0 0 0 0   As a passenger in a car for an hour without a break 2 0 0 0   Lying down to rest in the afternoon when circumstances permit 3 0 1 1   Sitting and talking to someone 0 0 0 0   Sitting quietly after a lunch without alcohol 0 0 0 0   In a car, while stopped for a few minutes in traffic 0 0 0 0   Total score 5 2 2 3       Previous Report Reviewed: office notes     The following portions of the patient's history were reviewed and updated as appropriate: He  has a past medical history of Atrial fibrillation; CAD (coronary artery disease); Diabetes mellitus; Diabetes mellitus type II; Elevated PSA; Hyperlipidemia associated with type 2 diabetes mellitus (7/10/2012); Hyperlipidemia LDL goal < 70; Hypertension; Hypertension goal BP (blood pressure) < 130/80; Mass of adrenal gland; and Mild acid reflux.  He  has a past surgical history that includes Tonsillectomy;  Cholecystectomy; Coronary artery bypass graft; Eye surgery; Cardiac catheterization (2/2015); ostate surgery; radiation treatment; and Cardioversion.  His family history includes Coronary artery disease in his father; Diabetes in his father; Heart disease in his father and paternal aunt; Hypertension in his mother; Stroke in his maternal grandmother and mother.  He  reports that he has never smoked. He has never used smokeless tobacco. He reports that he drinks alcohol. He reports that he does not use drugs.  He has a current medication list which includes the following prescription(s): albuterol, amiodarone, atorvastatin, benzonatate, blood sugar diagnostic, breeze 2 test strips, calcitriol, carvedilol, cefaclor, docusate sodium, famotidine, fexofenadine, furosemide, insulin glargine, pen needle, diabetic, lancets/blood glucose strips, metolazone, nitroglycerin, pen needle, ramipril, sodium,potassium,mag sulfates, spironolactone, and xarelto.  He is allergic to codeine and fish oil..    Review of Systems   Constitutional: Negative for fever and chills.   HENT: Positive for rhinorrhea and congestion. Negative for nosebleeds and sore throat.    Eyes: Negative for itching.   Respiratory: Positive for cough and dyspnea on extertion. Negative for chest tightness.    Cardiovascular: Negative for chest pain and leg swelling.   Genitourinary: Negative for difficulty urinating and hematuria.   Endocrine: Negative for polydipsia, cold intolerance and heat intolerance.    Musculoskeletal: Positive for back pain. Negative for arthralgias.   Skin: Negative for rash.   Gastrointestinal: Negative for nausea, vomiting, abdominal pain, abdominal distention and acid reflux.   Neurological: Negative for dizziness, syncope, light-headedness and headaches.   Hematological: Excessive bruising. Does not bruise/bleed easily.   Psychiatric/Behavioral: The patient is nervous/anxious.       Objective:     SERGEY on CPAP  Continue CPAP of  9.5  "LASHONWP. (DME - [facility])     Discussed therapeutic goals for positive airway pressure therapy(CPAP or BiPAP): Ideal is usage 100% of nights for 6 - 8 hours per night. Minimum usage is 70% of night for at least 4 hours per night used. Pateint expressed understanding. All Questions answered.    CPAP supplies renewed.       Morbid obesity with BMI of 40.0-44.9, adult  General weight loss/lifestyle modification strategies discussed (elicit support from others; identify saboteurs; non-food rewards, etc).  Diet interventions: low calorie (1000 kCal/d) deficit diet.    Vitals:    09/25/17 1601   BP: 118/62   Pulse: (!) 50   Resp: 18   SpO2: 95%   Weight: 128.7 kg (283 lb 11.7 oz)   Height: 5' 9" (1.753 m)     Body mass index is 41.9 kg/m².     Physical Exam   Constitutional: He is oriented to person, place, and time. He appears well-developed.   Morbidly Obese   HENT:   Head: Normocephalic and atraumatic.   Eyes: EOM are normal. Pupils are equal, round, and reactive to light.   Neck: Normal range of motion. Neck supple.   Cardiovascular: Normal rate.  An irregularly irregular rhythm present.   Pulmonary/Chest: Effort normal and breath sounds normal.   Abdominal: Soft. Bowel sounds are normal.   Musculoskeletal: Normal range of motion.   Neurological: He is alert and oriented to person, place, and time.   Skin: Skin is warm and dry.   Psychiatric: He has a normal mood and affect. His behavior is normal.       Personal Diagnostic Review  CPAP complaince download.     Assessment:     1. SERGEY on CPAP Stable   2. Morbid obesity with BMI of 40.0-44.9, adult Stable       Orders Placed This Encounter   Procedures    CPAP/BIPAP SUPPLIES     Order Specific Question:   Type of mask:     Answer:   Nasal     Order Specific Question:   Headgear?     Answer:   Yes     Order Specific Question:   Tubing?     Answer:   Yes     Order Specific Question:   Humidifier chamber?     Answer:   Yes     Order Specific Question:   Chin strap?     " Answer:   Yes     Order Specific Question:   Filters?     Answer:   Yes     Order Specific Question:   Length of need (1-99 months):     Answer:   99       Plan:     Discussed diagnosis, its evaluation, treatment and usual course. All questions answered.    SERGEY on CPAP  Continue CPAP of  9.5 CMWP. (DME - [facility])     Discussed therapeutic goals for positive airway pressure therapy(CPAP or BiPAP): Ideal is usage 100% of nights for 6 - 8 hours per night. Minimum usage is 70% of night for at least 4 hours per night used. Pateint expressed understanding. All Questions answered.    CPAP supplies renewed.       Morbid obesity with BMI of 40.0-44.9, adult  General weight loss/lifestyle modification strategies discussed (elicit support from others; identify saboteurs; non-food rewards, etc).  Diet interventions: low calorie (1000 kCal/d) deficit diet.      TIME SPENT WITH PATIENT: Time spent: 35 minutes in face to face  discussion concerning diagnosis, prognosis, review of lab and test results, benefits of treatment as well as management of disease, counseling of patient and coordination of care between various health  care providers . Greater than half the time spent was used for coordination of care and counseling of patient.     Return in about 1 year (around 9/25/2018) for SERGEY, Morbid Obesity.

## 2017-10-12 RX ORDER — ATORVASTATIN CALCIUM 80 MG/1
TABLET, FILM COATED ORAL
Qty: 90 TABLET | Refills: 1 | Status: SHIPPED | OUTPATIENT
Start: 2017-10-12 | End: 2018-01-08 | Stop reason: SDUPTHER

## 2017-10-22 DIAGNOSIS — R05.3 CHRONIC COUGH: ICD-10-CM

## 2017-10-23 RX ORDER — BENZONATATE 100 MG/1
CAPSULE ORAL
Qty: 120 CAPSULE | Refills: 6 | Status: SHIPPED | OUTPATIENT
Start: 2017-10-23 | End: 2018-01-08 | Stop reason: SDUPTHER

## 2017-12-21 ENCOUNTER — OFFICE VISIT (OUTPATIENT)
Dept: FAMILY MEDICINE | Facility: CLINIC | Age: 67
End: 2017-12-21
Payer: COMMERCIAL

## 2017-12-21 ENCOUNTER — TELEPHONE (OUTPATIENT)
Dept: FAMILY MEDICINE | Facility: CLINIC | Age: 67
End: 2017-12-21

## 2017-12-21 VITALS
WEIGHT: 280.44 LBS | BODY MASS INDEX: 40.15 KG/M2 | SYSTOLIC BLOOD PRESSURE: 90 MMHG | HEIGHT: 70 IN | TEMPERATURE: 98 F | DIASTOLIC BLOOD PRESSURE: 51 MMHG | HEART RATE: 53 BPM

## 2017-12-21 DIAGNOSIS — I15.2 HYPERTENSION ASSOCIATED WITH DIABETES: ICD-10-CM

## 2017-12-21 DIAGNOSIS — H66.001 ACUTE SUPPURATIVE OTITIS MEDIA OF RIGHT EAR WITHOUT SPONTANEOUS RUPTURE OF TYMPANIC MEMBRANE, RECURRENCE NOT SPECIFIED: Primary | ICD-10-CM

## 2017-12-21 DIAGNOSIS — E78.5 HYPERLIPIDEMIA ASSOCIATED WITH TYPE 2 DIABETES MELLITUS: ICD-10-CM

## 2017-12-21 DIAGNOSIS — E11.9 TYPE 2 DIABETES MELLITUS WITHOUT COMPLICATION, WITH LONG-TERM CURRENT USE OF INSULIN: Primary | ICD-10-CM

## 2017-12-21 DIAGNOSIS — E11.69 HYPERLIPIDEMIA ASSOCIATED WITH TYPE 2 DIABETES MELLITUS: ICD-10-CM

## 2017-12-21 DIAGNOSIS — Z79.4 TYPE 2 DIABETES MELLITUS WITHOUT COMPLICATION, WITH LONG-TERM CURRENT USE OF INSULIN: Primary | ICD-10-CM

## 2017-12-21 DIAGNOSIS — E11.59 HYPERTENSION ASSOCIATED WITH DIABETES: ICD-10-CM

## 2017-12-21 PROCEDURE — 99213 OFFICE O/P EST LOW 20 MIN: CPT | Mod: S$GLB,,, | Performed by: NURSE PRACTITIONER

## 2017-12-21 PROCEDURE — 99999 PR PBB SHADOW E&M-EST. PATIENT-LVL III: CPT | Mod: PBBFAC,,, | Performed by: NURSE PRACTITIONER

## 2017-12-21 RX ORDER — AMOXICILLIN AND CLAVULANATE POTASSIUM 875; 125 MG/1; MG/1
1 TABLET, FILM COATED ORAL EVERY 12 HOURS
Qty: 20 TABLET | Refills: 0 | Status: SHIPPED | OUTPATIENT
Start: 2017-12-21 | End: 2018-01-08

## 2017-12-21 RX ORDER — GUAIFENESIN 600 MG/1
1200 TABLET, EXTENDED RELEASE ORAL 2 TIMES DAILY
Qty: 40 TABLET | Refills: 0 | COMMUNITY
Start: 2017-12-21 | End: 2017-12-31

## 2017-12-21 RX ORDER — RAMIPRIL 1.25 MG/1
CAPSULE ORAL
COMMUNITY
Start: 2017-12-11 | End: 2018-01-08 | Stop reason: SDUPTHER

## 2017-12-21 NOTE — TELEPHONE ENCOUNTER
----- Message from Snehal Lamb sent at 12/21/2017 11:07 AM CST -----  Contact: pt  Please call pt @ [phone] regarding an order to get for appt on 01/152018

## 2017-12-21 NOTE — TELEPHONE ENCOUNTER
I have signed for the following orders AND/OR meds.  Please call the patient and ask the patient to schedule the testing AND/OR inform about any medications that were sent.      Orders Placed This Encounter   Procedures    Pneumococcal Polysaccharide Vaccine (23 Valent) (SQ/IM)     Administer a Pneumococcal 23 vaccine to the patient.    Comprehensive metabolic panel     Standing Status:   Future     Standing Expiration Date:   12/21/2018    Lipid panel     Standing Status:   Future     Standing Expiration Date:   12/21/2018    Hemoglobin A1c     Standing Status:   Future     Standing Expiration Date:   12/21/2018

## 2017-12-21 NOTE — PROGRESS NOTES
Subjective:       Patient ID: Alfredo Brennan is a 67 y.o. male.    Chief Complaint: Otalgia    Otalgia    There is pain in the right ear. This is a new problem. The current episode started in the past 7 days. The problem occurs constantly. The problem has been gradually worsening. The pain is moderate. Associated symptoms include hearing loss. Pertinent negatives include no abdominal pain, coughing, diarrhea, headaches, rash, sore throat or vomiting. He has tried acetaminophen for the symptoms. The treatment provided no relief.       Review of Systems   Constitutional: Negative for fatigue, fever and unexpected weight change.   HENT: Positive for ear pain and hearing loss. Negative for sore throat.    Eyes: Negative.  Negative for pain and visual disturbance.   Respiratory: Negative for cough and shortness of breath.    Cardiovascular: Negative for chest pain and palpitations.   Gastrointestinal: Negative for abdominal pain, diarrhea, nausea and vomiting.   Genitourinary: Negative for dysuria and frequency.   Musculoskeletal: Negative for arthralgias and myalgias.   Skin: Negative for color change and rash.   Neurological: Negative for dizziness and headaches.   Psychiatric/Behavioral: Negative for sleep disturbance. The patient is not nervous/anxious.        Vitals:    12/21/17 1525   BP: (!) 90/51   Pulse: (!) 53   Temp: 98.4 °F (36.9 °C)       Objective:     Current Outpatient Prescriptions   Medication Sig Dispense Refill    albuterol 90 mcg/actuation inhaler Inhale 2 puffs into the lungs every 6 (six) hours as needed for Wheezing. 18 g 1    amiodarone (PACERONE) 200 MG Tab Take 1 tablet (200 mg total) by mouth 2 (two) times daily. 60 tablet 11    atorvastatin (LIPITOR) 80 MG tablet TAKE 1 TABLET ONCE DAILY 90 tablet 1    benzonatate (TESSALON) 100 MG capsule TAKE ONE CAPSULE BY MOUTH EVERY 4 HOURS AS NEEDED FOR COUGH 120 capsule 6    blood sugar diagnostic Strp Breeze Two      BREEZE 2 TEST STRIPS Strp  "USE ONE STRIP TO CHECK GLUCOSE 3 TO 4 TIMES DAILY AS NEEDED 100 strip 11    calcitRIOL (ROCALTROL) 0.25 MCG Cap       carvedilol (COREG) 3.125 MG tablet Take half in am and take half in pm      docusate sodium (STOOL SOFTENER) 100 MG capsule 3 (three) times daily as needed. Every day      famotidine (PEPCID) 20 MG tablet 2 (two) times daily. Every day      FLUZONE HIGH-DOSE 2017-18, PF, 180 mcg/0.5 mL vaccine       furosemide (LASIX) 40 MG tablet Take 40 mg by mouth once daily. Take one tablet in am, may take one tablet in pm if needed      insulin glargine (LANTUS SOLOSTAR) 100 unit/mL (3 mL) InPn pen INJECT 52 UNITS            SUBCUTANEOUSLY ONCE DAILY 45 mL 6    insulin needles, disposable, (ADVOCATE PEN NEEDLES) 29 x 1/2 " Ndle use directed directed Every day.  Pen Needles for Lantus Solostar to use daily---31 gauge 8mm needles      LANCETS & BLOOD GLUCOSE STRIPS MISC qid      metolazone (ZAROXOLYN) 5 MG tablet Take 1 tablet (5 mg total) by mouth once daily. 30 tablet 11    nitroGLYCERIN (NITROSTAT) 0.4 MG SL tablet Place 0.4 mg under the tongue every 5 (five) minutes as needed for Chest pain.      PEN NEEDLE 31 gauge x 5/16" Ndle USE ONE SYRINGE EVERY  each 11    ramipril (ALTACE) 1.25 MG capsule       spironolactone (ALDACTONE) 25 MG tablet 1/2 Every day      XARELTO 15 mg Tab       amoxicillin-clavulanate 875-125mg (AUGMENTIN) 875-125 mg per tablet Take 1 tablet by mouth every 12 (twelve) hours. 20 tablet 0    benzonatate (TESSALON) 200 MG capsule Take 1 capsule (200 mg total) by mouth 3 (three) times daily as needed for Cough. 30 capsule 0    guaiFENesin (MUCINEX) 600 mg 12 hr tablet Take 2 tablets (1,200 mg total) by mouth 2 (two) times daily. 40 tablet 0    ramipril (ALTACE) 2.5 MG capsule Take 2.5 mg by mouth once daily. TAKE ONE CAPSULE BY MOUTH ONCE DAILY      sodium,potassium,mag sulfates (SUPREP BOWEL PREP KIT) 17.5-3.13-1.6 gram SolR Take as instructed on prep sheet 354 mL " 0     No current facility-administered medications for this visit.        Physical Exam   Constitutional: He is oriented to person, place, and time. He appears well-developed. No distress.   HENT:   Head: Normocephalic and atraumatic.   Right Ear: There is swelling. Tympanic membrane is injected, erythematous and bulging.   Eyes: EOM are normal. Pupils are equal, round, and reactive to light.   Neck: Normal range of motion. Neck supple.   Cardiovascular: Normal rate and regular rhythm.    Pulmonary/Chest: Effort normal and breath sounds normal.   Musculoskeletal: Normal range of motion.   Neurological: He is alert and oriented to person, place, and time.   Skin: Skin is warm and dry. No rash noted.   Psychiatric: He has a normal mood and affect. Thought content normal.   Nursing note and vitals reviewed.      Assessment:       1. Acute suppurative otitis media of right ear without spontaneous rupture of tympanic membrane, recurrence not specified        Plan:   Acute suppurative otitis media of right ear without spontaneous rupture of tympanic membrane, recurrence not specified    Other orders  -     amoxicillin-clavulanate 875-125mg (AUGMENTIN) 875-125 mg per tablet; Take 1 tablet by mouth every 12 (twelve) hours.  Dispense: 20 tablet; Refill: 0  -     guaiFENesin (MUCINEX) 600 mg 12 hr tablet; Take 2 tablets (1,200 mg total) by mouth 2 (two) times daily.  Dispense: 40 tablet; Refill: 0        Return if symptoms worsen or fail to improve.

## 2017-12-21 NOTE — TELEPHONE ENCOUNTER
Diabetes Management Status    Statin: Taking  ACE/ARB: Taking    Screening or Prevention Patient's value Goal Complete/Controlled?   HgA1C Testing and Control   Lab Results   Component Value Date    HGBA1C 6.2 05/31/2017      Annually/Less than 8% Yes   Lipid profile : 04/21/2017 Annually Yes   LDL control Lab Results   Component Value Date    LDLCALC 60.2 (L) 04/21/2017    Annually/Less than 100 mg/dl  Yes   Nephropathy screening Lab Results   Component Value Date    LABMICR 53.0 01/27/2015     Lab Results   Component Value Date    PROTEINUA Negative 01/27/2015    Annually No   Blood pressure BP Readings from Last 1 Encounters:   09/25/17 118/62    Less than 140/90 Yes   Dilated retinal exam : 11/30/2016 Annually No   Foot exam   : 02/15/2017 Annually Yes

## 2017-12-21 NOTE — TELEPHONE ENCOUNTER
Pt states he had lab work done at [facility] 2 weeks ago. He has signed release to have results faxed. Unsure what tests he had done.

## 2018-01-04 PROBLEM — N18.30 CKD STAGE 3 DUE TO TYPE 2 DIABETES MELLITUS: Status: ACTIVE | Noted: 2018-01-04

## 2018-01-04 PROBLEM — E11.22 CKD STAGE 3 DUE TO TYPE 2 DIABETES MELLITUS: Status: ACTIVE | Noted: 2018-01-04

## 2018-01-04 LAB
ALBUMIN SERPL-MCNC: 4.1 G/DL (ref 3.6–5.1)
ALBUMIN/GLOB SERPL: 1.5 (CALC) (ref 1–2.5)
ALP SERPL-CCNC: 68 U/L (ref 40–115)
ALT SERPL-CCNC: 10 U/L (ref 9–46)
AST SERPL-CCNC: 12 U/L (ref 10–35)
BILIRUB SERPL-MCNC: 0.5 MG/DL (ref 0.2–1.2)
BUN SERPL-MCNC: 22 MG/DL (ref 7–25)
BUN/CREAT SERPL: 11 (CALC) (ref 6–22)
CALCIUM SERPL-MCNC: 8.7 MG/DL (ref 8.6–10.3)
CHLORIDE SERPL-SCNC: 103 MMOL/L (ref 98–110)
CHOLEST SERPL-MCNC: 134 MG/DL
CHOLEST/HDLC SERPL: 3.5 (CALC)
CO2 SERPL-SCNC: 30 MMOL/L (ref 20–31)
CREAT SERPL-MCNC: 2.04 MG/DL (ref 0.7–1.25)
GFR SERPL CREATININE-BSD FRML MDRD: 33 ML/MIN/1.73M2
GLOBULIN SER CALC-MCNC: 2.8 G/DL (CALC) (ref 1.9–3.7)
GLUCOSE SERPL-MCNC: 107 MG/DL (ref 65–99)
HBA1C MFR BLD: 6.4 % OF TOTAL HGB
HDLC SERPL-MCNC: 38 MG/DL
LDLC SERPL CALC-MCNC: 80 MG/DL (CALC)
NONHDLC SERPL-MCNC: 96 MG/DL (CALC)
POTASSIUM SERPL-SCNC: 4.2 MMOL/L (ref 3.5–5.3)
PROT SERPL-MCNC: 6.9 G/DL (ref 6.1–8.1)
SODIUM SERPL-SCNC: 138 MMOL/L (ref 135–146)
TRIGL SERPL-MCNC: 79 MG/DL

## 2018-01-04 NOTE — TELEPHONE ENCOUNTER
The LDL and the a1c are in control.  Please continue diabetes and cholesterol treatment.     The kidney function is very low and it is currently at Stage III renal insufficiency.  Please confirm that follow up with the nephrologist is continuing as this needs to happen.

## 2018-01-08 ENCOUNTER — OFFICE VISIT (OUTPATIENT)
Dept: FAMILY MEDICINE | Facility: CLINIC | Age: 68
End: 2018-01-08
Payer: COMMERCIAL

## 2018-01-08 VITALS
BODY MASS INDEX: 40.23 KG/M2 | SYSTOLIC BLOOD PRESSURE: 98 MMHG | DIASTOLIC BLOOD PRESSURE: 60 MMHG | HEIGHT: 70 IN | WEIGHT: 281 LBS | TEMPERATURE: 99 F | HEART RATE: 47 BPM

## 2018-01-08 DIAGNOSIS — I15.2 HYPERTENSION ASSOCIATED WITH DIABETES: ICD-10-CM

## 2018-01-08 DIAGNOSIS — Z86.010 HISTORY OF COLON POLYPS: ICD-10-CM

## 2018-01-08 DIAGNOSIS — E11.22 CONTROLLED TYPE 2 DIABETES MELLITUS WITH STAGE 3 CHRONIC KIDNEY DISEASE, WITH LONG-TERM CURRENT USE OF INSULIN: Primary | ICD-10-CM

## 2018-01-08 DIAGNOSIS — E11.22 CKD STAGE 3 DUE TO TYPE 2 DIABETES MELLITUS: ICD-10-CM

## 2018-01-08 DIAGNOSIS — N18.30 CONTROLLED TYPE 2 DIABETES MELLITUS WITH STAGE 3 CHRONIC KIDNEY DISEASE, WITH LONG-TERM CURRENT USE OF INSULIN: Primary | ICD-10-CM

## 2018-01-08 DIAGNOSIS — R05.3 CHRONIC COUGH: ICD-10-CM

## 2018-01-08 DIAGNOSIS — Z79.4 CONTROLLED TYPE 2 DIABETES MELLITUS WITH STAGE 3 CHRONIC KIDNEY DISEASE, WITH LONG-TERM CURRENT USE OF INSULIN: Primary | ICD-10-CM

## 2018-01-08 DIAGNOSIS — E66.01 MORBID OBESITY WITH BMI OF 40.0-44.9, ADULT: Chronic | ICD-10-CM

## 2018-01-08 DIAGNOSIS — E11.59 HYPERTENSION ASSOCIATED WITH DIABETES: ICD-10-CM

## 2018-01-08 DIAGNOSIS — E11.69 HYPERLIPIDEMIA ASSOCIATED WITH TYPE 2 DIABETES MELLITUS: ICD-10-CM

## 2018-01-08 DIAGNOSIS — G47.33 OSA ON CPAP: ICD-10-CM

## 2018-01-08 DIAGNOSIS — E78.5 HYPERLIPIDEMIA ASSOCIATED WITH TYPE 2 DIABETES MELLITUS: ICD-10-CM

## 2018-01-08 DIAGNOSIS — N64.4 BREAST PAIN, LEFT: ICD-10-CM

## 2018-01-08 DIAGNOSIS — N18.30 CKD STAGE 3 DUE TO TYPE 2 DIABETES MELLITUS: ICD-10-CM

## 2018-01-08 DIAGNOSIS — I25.10 CORONARY ARTERY DISEASE, ANGINA PRESENCE UNSPECIFIED, UNSPECIFIED VESSEL OR LESION TYPE, UNSPECIFIED WHETHER NATIVE OR TRANSPLANTED HEART: ICD-10-CM

## 2018-01-08 LAB
BILIRUB UR QL STRIP: NEGATIVE
CLARITY UR: CLEAR
COLOR UR: YELLOW
GLUCOSE UR QL STRIP: NEGATIVE
HGB UR QL STRIP: ABNORMAL
KETONES UR QL STRIP: NEGATIVE
LEUKOCYTE ESTERASE UR QL STRIP: NEGATIVE
NITRITE UR QL STRIP: NEGATIVE
PH UR STRIP: 6 [PH] (ref 5–8)
PROT UR QL STRIP: NEGATIVE
SP GR UR STRIP: 1.01 (ref 1–1.03)
URN SPEC COLLECT METH UR: ABNORMAL

## 2018-01-08 PROCEDURE — 90732 PPSV23 VACC 2 YRS+ SUBQ/IM: CPT | Mod: S$GLB,,, | Performed by: FAMILY MEDICINE

## 2018-01-08 PROCEDURE — 99215 OFFICE O/P EST HI 40 MIN: CPT | Mod: 25,S$GLB,, | Performed by: FAMILY MEDICINE

## 2018-01-08 PROCEDURE — 99999 PR PBB SHADOW E&M-EST. PATIENT-LVL V: CPT | Mod: PBBFAC,,, | Performed by: FAMILY MEDICINE

## 2018-01-08 PROCEDURE — 81002 URINALYSIS NONAUTO W/O SCOPE: CPT | Mod: PO

## 2018-01-08 PROCEDURE — 90471 IMMUNIZATION ADMIN: CPT | Mod: S$GLB,,, | Performed by: FAMILY MEDICINE

## 2018-01-08 RX ORDER — POLYETHYLENE GLYCOL 3350, SODIUM SULFATE ANHYDROUS, SODIUM BICARBONATE, SODIUM CHLORIDE, POTASSIUM CHLORIDE 236; 22.74; 6.74; 5.86; 2.97 G/4L; G/4L; G/4L; G/4L; G/4L
POWDER, FOR SOLUTION ORAL
Qty: 4000 ML | Refills: 0 | Status: SHIPPED | OUTPATIENT
Start: 2018-01-08 | End: 2018-12-12

## 2018-01-08 RX ORDER — METOLAZONE 5 MG/1
5 TABLET ORAL DAILY PRN
Qty: 90 TABLET | Refills: 3 | Status: SHIPPED | OUTPATIENT
Start: 2018-01-08 | End: 2019-01-28 | Stop reason: SDUPTHER

## 2018-01-08 RX ORDER — PEN NEEDLE, DIABETIC 30 GX3/16"
NEEDLE, DISPOSABLE MISCELLANEOUS
Qty: 100 EACH | Refills: 3 | Status: SHIPPED | OUTPATIENT
Start: 2018-01-08

## 2018-01-08 RX ORDER — CARVEDILOL 3.12 MG/1
TABLET ORAL
Qty: 90 TABLET | Refills: 3 | Status: SHIPPED | OUTPATIENT
Start: 2018-01-08 | End: 2018-12-12

## 2018-01-08 RX ORDER — CALCITRIOL 0.25 UG/1
CAPSULE ORAL
Qty: 108 CAPSULE | Refills: 3 | Status: SHIPPED | OUTPATIENT
Start: 2018-01-08 | End: 2019-01-28 | Stop reason: SDUPTHER

## 2018-01-08 RX ORDER — ATORVASTATIN CALCIUM 80 MG/1
80 TABLET, FILM COATED ORAL DAILY
Qty: 90 TABLET | Refills: 3 | Status: SHIPPED | OUTPATIENT
Start: 2018-01-08 | End: 2018-04-20 | Stop reason: SDUPTHER

## 2018-01-08 RX ORDER — RAMIPRIL 1.25 MG/1
1.25 CAPSULE ORAL DAILY
Qty: 90 CAPSULE | Refills: 3 | Status: SHIPPED | OUTPATIENT
Start: 2018-01-08 | End: 2019-01-28 | Stop reason: SDUPTHER

## 2018-01-08 RX ORDER — PROMETHAZINE HYDROCHLORIDE 25 MG/1
25 TABLET ORAL EVERY 6 HOURS PRN
Qty: 6 TABLET | Refills: 0 | Status: SHIPPED | OUTPATIENT
Start: 2018-01-08 | End: 2018-12-04 | Stop reason: SDUPTHER

## 2018-01-08 RX ORDER — FUROSEMIDE 40 MG/1
TABLET ORAL
Qty: 90 TABLET | Refills: 3 | Status: SHIPPED | OUTPATIENT
Start: 2018-01-08 | End: 2019-01-16 | Stop reason: SDUPTHER

## 2018-01-08 RX ORDER — INSULIN GLARGINE 100 [IU]/ML
INJECTION, SOLUTION SUBCUTANEOUS
Qty: 45 ML | Refills: 6 | Status: SHIPPED | OUTPATIENT
Start: 2018-01-08 | End: 2019-01-28 | Stop reason: SDUPTHER

## 2018-01-08 RX ORDER — BENZONATATE 100 MG/1
CAPSULE ORAL
Qty: 100 CAPSULE | Refills: 3 | Status: SHIPPED | OUTPATIENT
Start: 2018-01-08 | End: 2021-01-27

## 2018-01-08 RX ORDER — RIVAROXABAN 15 MG/1
15 TABLET, FILM COATED ORAL DAILY
Qty: 90 TABLET | Refills: 3 | Status: SHIPPED | OUTPATIENT
Start: 2018-01-08 | End: 2018-06-12

## 2018-01-08 RX ORDER — SPIRONOLACTONE 25 MG/1
12.5 TABLET ORAL DAILY
Qty: 45 TABLET | Refills: 3 | Status: SHIPPED | OUTPATIENT
Start: 2018-01-08 | End: 2019-01-28 | Stop reason: SDUPTHER

## 2018-01-08 NOTE — PROGRESS NOTES
Subjective:      Patient ID: Alfredo Brennan is a 67 y.o. male.    Chief Complaint: Annual Exam    HPI  Problem List Items Addressed This Visit     CAD (coronary artery disease)    Overview     The patient presents with coronary artery disease.  Denies chest pain, shortness of breath, left arm or neck pain, diaphoresis, nausea, vomiting, palpitations, paroxysmal nocturnal dyspnea, orthopnea, claudication or decreased exercise tolerance.  The patient reports excellent compliance.  Current treatment has included medications that are listed in medication list.  The cannot identify any exacerbating factors.             Relevant Medications    XARELTO 15 mg Tab    CKD stage 3 due to type 2 diabetes mellitus    Overview     Lab Results   Component Value Date    CREATININE 2.04 (H) 01/03/2018    BUN 22 01/03/2018     01/03/2018    K 4.2 01/03/2018     01/03/2018    CO2 30 01/03/2018     He has CKD and he is followed by Dr. Kendall on this.  He has never had dialysis.      BMP  Lab Results   Component Value Date     01/03/2018    K 4.2 01/03/2018     01/03/2018    CO2 30 01/03/2018    BUN 22 01/03/2018    CREATININE 2.04 (H) 01/03/2018    CALCIUM 8.7 01/03/2018    ANIONGAP 8 01/27/2015    ESTGFRAFRICA 38 (L) 01/03/2018    EGFRNONAA 33 (L) 01/03/2018              Relevant Medications    calcitRIOL (ROCALTROL) 0.25 MCG Cap    Hyperlipidemia associated with type 2 diabetes mellitus    Overview     The patient presents with hyperlipidemia.  The patient reports tolerating the medication well and is in excellent compliance.  There have been no medication side effects.  The patient denies chest pain, neuropathy, and myalgias.  The patient has reduced fat intake and has been exercising.  Current treatment has included the medications listed in the med card.    Lab Results   Component Value Date    CHOL 134 01/03/2018    CHOL 115 (L) 04/21/2017    CHOL 133 10/20/2016       Lab Results   Component Value Date     "HDL 38 (L) 01/03/2018    HDL 35 (L) 04/21/2017    HDL 34 (L) 10/20/2016       Lab Results   Component Value Date    LDLCALC 80 01/03/2018    LDLCALC 60.2 (L) 04/21/2017    LDLCALC 74.0 10/20/2016       Lab Results   Component Value Date    TRIG 79 01/03/2018    TRIG 99 04/21/2017    TRIG 125 10/20/2016       Lab Results   Component Value Date    CHOLHDL 3.5 01/03/2018    CHOLHDL 30.4 04/21/2017    CHOLHDL 25.6 10/20/2016     Lab Results   Component Value Date    ALT 10 01/03/2018    AST 12 01/03/2018    ALKPHOS 68 01/03/2018    BILITOT 0.5 01/03/2018              Relevant Medications    atorvastatin (LIPITOR) 80 MG tablet    Hypertension associated with diabetes    Overview     The patient presents with essential hypertension.  The patient is tolerating the medication well and is in excellent compliance.  The patient is experiencing no side effects.  Counseling was offered regarding low salt diets.  The patient has a reduced salt intake.  The patient denies chest pain, palpitations, shortness of breath, dyspnea on exertion, left or murmur neck pain, nausea, vomiting, diaphoresis, paroxysmal nocturnal dyspnea, and orthopnea.   BP 98/60   Pulse (!) 47   Temp 98.6 °F (37 °C) (Oral)   Ht 5' 9.5" (1.765 m)   Wt 127.5 kg (281 lb)   BMI 40.90 kg/m²     BP Readings from Last 3 Encounters:   01/08/18 98/60   12/21/17 (!) 90/51   09/25/17 118/62              Relevant Medications    carvedilol (COREG) 3.125 MG tablet    furosemide (LASIX) 40 MG tablet    metOLazone (ZAROXOLYN) 5 MG tablet    ramipril (ALTACE) 1.25 MG capsule    spironolactone (ALDACTONE) 25 MG tablet    Morbid obesity with BMI of 40.0-44.9, adult (Chronic)    Overview     He has morbid obesity and he is not dieting.  This was discussed today.  He is not exercising.             SERGEY on CPAP    Overview     He has SERGEY and he is on a cpap nightly.             Other Visit Diagnoses     Controlled type 2 diabetes mellitus with stage 3 chronic kidney disease, " "with long-term current use of insulin    -  Primary    Relevant Medications    insulin glargine (LANTUS SOLOSTAR) 100 unit/mL (3 mL) InPn pen    pen needle, diabetic (PEN NEEDLE) 31 gauge x 5/16" Ndle    Chronic cough        Relevant Medications    benzonatate (TESSALON) 100 MG capsule          He has a new complaint of a left breast pain for the last month.  He has felt a hardness in it at times.  He had an abscess in the left breast in the past.  No discharge from the breast is noted.       Health Maintenance Due   Topic Date Due    TETANUS VACCINE  02[birth year]03/1968    Colonoscopy  02/09/2015    Pneumococcal (65+) (2 of 2 - PPSV23) 04/11/2017    Eye Exam  11/30/2017    Foot Exam  02/15/2018       Past Medical History:  Past Medical History:   Diagnosis Date    Atrial fibrillation     CAD (coronary artery disease)     CKD stage 3 due to type 2 diabetes mellitus 1/4/2018    Lab Results Component Value Date  CREATININE 2.04 (H) 01/03/2018  BUN 22 01/03/2018   01/03/2018  K 4.2 01/03/2018   01/03/2018  CO2 30 01/03/2018  BMP Lab Results Component Value Date   01/03/2018  K 4.2 01/03/2018   01/03/2018  CO2 30 01/03/2018  BUN 22 01/03/2018  CREATININE 2.04 (H) 01/03/2018  CALCIUM 8.7 01/03/2018  ANIONGAP 8 01/27/2015  ESTGFRAFRICA 38 (L) 01/03/201    Diabetes mellitus     Diabetes mellitus type II     Elevated PSA     Hyperlipidemia associated with type 2 diabetes mellitus 7/10/2012    Hyperlipidemia LDL goal < 70     Hypertension     Hypertension goal BP (blood pressure) < 130/80     Mass of adrenal gland     Mild acid reflux      Past Surgical History:   Procedure Laterality Date    CARDIAC CATHETERIZATION  2/2015    CARDIOVERSION      CHOLECYSTECTOMY      CORONARY ARTERY BYPASS GRAFT      EYE SURGERY      PROSTATE SURGERY      radiation treatment      TONSILLECTOMY       Review of patient's allergies indicates:   Allergen Reactions    Codeine      Other reaction(s): " "Hallucinations    Fish oil Rash and Itching     Current Outpatient Prescriptions on File Prior to Visit   Medication Sig Dispense Refill    albuterol 90 mcg/actuation inhaler Inhale 2 puffs into the lungs every 6 (six) hours as needed for Wheezing. 18 g 1    amiodarone (PACERONE) 200 MG Tab Take 200 mg by mouth once daily.  60 tablet 11    BREEZE 2 TEST STRIPS Strp USE ONE STRIP TO CHECK GLUCOSE 3 TO 4 TIMES DAILY AS NEEDED 100 strip 11    docusate sodium (STOOL SOFTENER) 100 MG capsule 3 (three) times daily as needed. Every day      famotidine (PEPCID) 20 MG tablet 2 (two) times daily. Every day      FLUZONE HIGH-DOSE 2017-18, PF, 180 mcg/0.5 mL vaccine       LANCETS & BLOOD GLUCOSE STRIPS MISC qid      nitroGLYCERIN (NITROSTAT) 0.4 MG SL tablet Place 0.4 mg under the tongue every 5 (five) minutes as needed for Chest pain.      sodium,potassium,mag sulfates (SUPREP BOWEL PREP KIT) 17.5-3.13-1.6 gram SolR Take as instructed on prep sheet 354 mL 0    [DISCONTINUED] atorvastatin (LIPITOR) 80 MG tablet TAKE 1 TABLET ONCE DAILY 90 tablet 1    [DISCONTINUED] benzonatate (TESSALON) 100 MG capsule TAKE ONE CAPSULE BY MOUTH EVERY 4 HOURS AS NEEDED FOR COUGH 120 capsule 6    [DISCONTINUED] blood sugar diagnostic Strp Breeze Two      [DISCONTINUED] calcitRIOL (ROCALTROL) 0.25 MCG Cap       [DISCONTINUED] carvedilol (COREG) 3.125 MG tablet Take half in am and take half in pm      [DISCONTINUED] furosemide (LASIX) 40 MG tablet Take 40 mg by mouth once daily. Take one tablet in am, may take one tablet in pm if needed      [DISCONTINUED] insulin glargine (LANTUS SOLOSTAR) 100 unit/mL (3 mL) InPn pen INJECT 52 UNITS            SUBCUTANEOUSLY ONCE DAILY 45 mL 6    [DISCONTINUED] insulin needles, disposable, (ADVOCATE PEN NEEDLES) 29 x 1/2 " Ndle use directed directed Every day.  Pen Needles for Lantus Solostar to use daily---31 gauge 8mm needles      [DISCONTINUED] metolazone (ZAROXOLYN) 5 MG tablet Take 5 mg by " "mouth daily as needed.  30 tablet 11    [DISCONTINUED] PEN NEEDLE 31 gauge x 5/16" Ndle USE ONE SYRINGE EVERY  each 11    [DISCONTINUED] ramipril (ALTACE) 1.25 MG capsule       [DISCONTINUED] spironolactone (ALDACTONE) 25 MG tablet 1/2 Every day      [DISCONTINUED] XARELTO 15 mg Tab       [DISCONTINUED] amoxicillin-clavulanate 875-125mg (AUGMENTIN) 875-125 mg per tablet Take 1 tablet by mouth every 12 (twelve) hours. 20 tablet 0    [DISCONTINUED] benzonatate (TESSALON) 200 MG capsule Take 1 capsule (200 mg total) by mouth 3 (three) times daily as needed for Cough. 30 capsule 0    [DISCONTINUED] ramipril (ALTACE) 2.5 MG capsule Take 2.5 mg by mouth once daily. TAKE ONE CAPSULE BY MOUTH ONCE DAILY       No current facility-administered medications on file prior to visit.      Social History     Social History    Marital status:      Spouse name: N/A    Number of children: N/A    Years of education: N/A     Occupational History    Not on file.     Social History Main Topics    Smoking status: Never Smoker    Smokeless tobacco: Never Used    Alcohol use Yes    Drug use: No    Sexual activity: Not on file      Comment: once monthly     Other Topics Concern    Not on file     Social History Narrative    No narrative on file     Family History   Problem Relation Age of Onset    Stroke Mother     Hypertension Mother     Heart disease Father     Coronary artery disease Father     Diabetes Father     Heart disease Paternal Aunt     Stroke Maternal Grandmother            Review of Systems   Constitutional: Negative.  Negative for chills, diaphoresis and fever.   HENT: Negative for congestion, hearing loss, mouth sores, postnasal drip and sore throat.    Eyes: Negative for pain and visual disturbance.   Respiratory: Negative for cough, chest tightness, shortness of breath and wheezing.    Cardiovascular: Negative for chest pain.   Gastrointestinal: Negative for abdominal pain, anal " "bleeding, blood in stool, constipation, diarrhea, nausea and vomiting.   Genitourinary: Negative for dysuria and hematuria.   Musculoskeletal: Negative for back pain, neck pain and neck stiffness.   Skin: Negative for rash.   Neurological: Negative for dizziness and weakness.       Objective:   BP 98/60   Pulse (!) 47   Temp 98.6 °F (37 °C) (Oral)   Ht 5' 9.5" (1.765 m)   Wt 127.5 kg (281 lb)   BMI 40.90 kg/m²     Physical Exam   Constitutional: He is oriented to person, place, and time. He appears well-developed and well-nourished.   HENT:   Head: Normocephalic and atraumatic.   Right Ear: External ear normal.   Left Ear: External ear normal.   Nose: Nose normal.   Mouth/Throat: Oropharynx is clear and moist. No oropharyngeal exudate.   Eyes: Conjunctivae and EOM are normal. Pupils are equal, round, and reactive to light. Right eye exhibits no discharge. Left eye exhibits no discharge. No scleral icterus.   Neck: Normal range of motion. Neck supple. No JVD present. No thyromegaly present.   Cardiovascular: Normal rate, regular rhythm, normal heart sounds and intact distal pulses.  Exam reveals no gallop and no friction rub.    No murmur heard.  Pulses:       Dorsalis pedis pulses are 2+ on the right side, and 2+ on the left side.        Posterior tibial pulses are 2+ on the right side, and 2+ on the left side.   Pulmonary/Chest: Effort normal and breath sounds normal. No respiratory distress. He has no wheezes. He has no rales. He exhibits no tenderness.   Abdominal: Soft. Bowel sounds are normal. He exhibits no distension and no mass. There is no tenderness. There is no rebound and no guarding.   Musculoskeletal: Normal range of motion. He exhibits no edema or tenderness.        Right foot: There is normal range of motion and no deformity.        Left foot: There is normal range of motion and no deformity.   Feet:   Right Foot:   Protective Sensation: 10 sites tested. 10 sites sensed.   Skin Integrity: " "Negative for ulcer, blister, skin breakdown, erythema, warmth, callus or dry skin.   Left Foot:   Protective Sensation: 10 sites tested. 10 sites sensed.   Skin Integrity: Negative for ulcer, blister, skin breakdown, erythema, warmth, callus or dry skin.   Lymphadenopathy:     He has no cervical adenopathy.   Neurological: He is alert and oriented to person, place, and time. No cranial nerve deficit. Coordination normal.   Skin: Skin is warm and dry. He is not diaphoretic.   Psychiatric: He has a normal mood and affect.       Assessment:     1. Controlled type 2 diabetes mellitus with stage 3 chronic kidney disease, with long-term current use of insulin    2. CKD stage 3 due to type 2 diabetes mellitus    3. Coronary artery disease, angina presence unspecified, unspecified vessel or lesion type, unspecified whether native or transplanted heart    4. Hyperlipidemia associated with type 2 diabetes mellitus    5. Hypertension associated with diabetes    6. Morbid obesity with BMI of 40.0-44.9, adult    7. SERGEY on CPAP    8. Chronic cough    9. Breast pain, left    10. History of colon polyps        Plan:   Alfredo was seen today for annual exam.    Diagnoses and all orders for this visit:    Controlled type 2 diabetes mellitus with stage 3 chronic kidney disease, with long-term current use of insulin  -     insulin glargine (LANTUS SOLOSTAR) 100 unit/mL (3 mL) InPn pen; INJECT 52 UNITS            SUBCUTANEOUSLY ONCE DAILY  -     pen needle, diabetic (PEN NEEDLE) 31 gauge x 5/16" Ndle; USE ONE SYRINGE EVERY DAY    CKD stage 3 due to type 2 diabetes mellitus  -     calcitRIOL (ROCALTROL) 0.25 MCG Cap; Use on daily during the week and two daily on weekends.  -     Urinalysis; Future    Coronary artery disease, angina presence unspecified, unspecified vessel or lesion type, unspecified whether native or transplanted heart  -     XARELTO 15 mg Tab; Take 1 tablet (15 mg total) by mouth once daily.    Hyperlipidemia associated " with type 2 diabetes mellitus  -     atorvastatin (LIPITOR) 80 MG tablet; Take 1 tablet (80 mg total) by mouth once daily.    Hypertension associated with diabetes  -     carvedilol (COREG) 3.125 MG tablet; Take half in am and take half in pm  -     furosemide (LASIX) 40 MG tablet; Take one tablet in am  -     metOLazone (ZAROXOLYN) 5 MG tablet; Take 1 tablet (5 mg total) by mouth daily as needed.  -     ramipril (ALTACE) 1.25 MG capsule; Take 1 capsule (1.25 mg total) by mouth once daily.  -     spironolactone (ALDACTONE) 25 MG tablet; Take 0.5 tablets (12.5 mg total) by mouth once daily. 1/2 Every day    Morbid obesity with BMI of 40.0-44.9, adult    SERGEY on CPAP    Chronic cough  -     benzonatate (TESSALON) 100 MG capsule; TAKE ONE CAPSULE BY MOUTH EVERY 8 HOURS AS NEEDED FOR COUGH    Breast pain, left  -     Mammo Digital Diagnostic Left with CAD; Future    History of colon polyps  -     Case request GI: COLONOSCOPY    Other orders  -     Pneumococcal Polysaccharide Vaccine (23 Valent) (SQ/IM)  -     promethazine (PHENERGAN) 25 MG tablet; Take 1 tablet (25 mg total) by mouth every 6 (six) hours as needed for Nausea.  -     polyethylene glycol (GOLYTELY,NULYTELY) 236-22.74-6.74 -5.86 gram suspension; Drink 1 glass every 15 minutes starting at 5PM the night before the procedure until the bottle is empty.    cont cpap.  Use the tessalon for his cough.

## 2018-01-09 ENCOUNTER — HOSPITAL ENCOUNTER (OUTPATIENT)
Dept: RADIOLOGY | Facility: HOSPITAL | Age: 68
Discharge: HOME | End: 2018-01-09
Attending: FAMILY MEDICINE
Payer: COMMERCIAL

## 2018-01-09 VITALS — WEIGHT: 281 LBS | BODY MASS INDEX: 40.23 KG/M2 | HEIGHT: 70 IN

## 2018-01-09 DIAGNOSIS — N64.4 BREAST PAIN, LEFT: ICD-10-CM

## 2018-01-09 PROCEDURE — 77066 DX MAMMO INCL CAD BI: CPT | Mod: 26,,, | Performed by: RADIOLOGY

## 2018-01-09 PROCEDURE — 77066 DX MAMMO INCL CAD BI: CPT | Mod: TC,PO

## 2018-01-09 PROCEDURE — 77062 BREAST TOMOSYNTHESIS BI: CPT | Mod: 26,,, | Performed by: RADIOLOGY

## 2018-01-09 NOTE — PROGRESS NOTES
The mammogram is negative for a mass.  This is good.  Ask him to let me know if he has swelling or increased pain.

## 2018-01-11 ENCOUNTER — DOCUMENTATION ONLY (OUTPATIENT)
Dept: ENDOSCOPY | Facility: HOSPITAL | Age: 68
End: 2018-01-11

## 2018-01-11 NOTE — PROGRESS NOTES
01/11/18 [facility] called, pt. Called back to schedule. Sent request to Dr. Cheryl Whitlock for Xarelto clearance, augie for response. Office#[phone]  Fax # [phone]

## 2018-02-09 ENCOUNTER — OFFICE VISIT (OUTPATIENT)
Dept: FAMILY MEDICINE | Facility: CLINIC | Age: 68
End: 2018-02-09
Payer: COMMERCIAL

## 2018-02-09 VITALS
HEIGHT: 70 IN | TEMPERATURE: 99 F | DIASTOLIC BLOOD PRESSURE: 55 MMHG | WEIGHT: 282.88 LBS | SYSTOLIC BLOOD PRESSURE: 92 MMHG | BODY MASS INDEX: 40.5 KG/M2 | HEART RATE: 56 BPM

## 2018-02-09 DIAGNOSIS — K52.9 GASTROENTERITIS: Primary | ICD-10-CM

## 2018-02-09 PROCEDURE — 99999 PR PBB SHADOW E&M-EST. PATIENT-LVL III: CPT | Mod: PBBFAC,,, | Performed by: NURSE PRACTITIONER

## 2018-02-09 PROCEDURE — 3008F BODY MASS INDEX DOCD: CPT | Mod: S$GLB,,, | Performed by: NURSE PRACTITIONER

## 2018-02-09 PROCEDURE — 99213 OFFICE O/P EST LOW 20 MIN: CPT | Mod: S$GLB,,, | Performed by: NURSE PRACTITIONER

## 2018-02-09 PROCEDURE — 1126F AMNT PAIN NOTED NONE PRSNT: CPT | Mod: S$GLB,,, | Performed by: NURSE PRACTITIONER

## 2018-02-09 PROCEDURE — 1159F MED LIST DOCD IN RCRD: CPT | Mod: S$GLB,,, | Performed by: NURSE PRACTITIONER

## 2018-02-09 RX ORDER — ONDANSETRON 4 MG/1
4 TABLET, FILM COATED ORAL EVERY 8 HOURS PRN
Qty: 15 TABLET | Refills: 0 | Status: SHIPPED | OUTPATIENT
Start: 2018-02-09 | End: 2018-02-14

## 2018-02-09 NOTE — PATIENT INSTRUCTIONS
Bowel rest today  [place] diet starting tomorrow  Hydrate slowly    Viral Gastroenteritis (Adult)    Gastroenteritis is commonly called the stomach flu. It is most often caused by a virus that affects the stomach and intestinal tract and usually lasts from 2 to 7 days. Common viruses causing gastroenteritis include norovirus, rotavirus, and hepatitis A. Non-viral causes of gastroenteritis include bacteria, parasites, and toxins.  The danger from repeated vomiting or diarrhea is dehydration. This is the loss of too much fluid from the body. When this occurs, body fluids must be replaced. Antibiotics do not help with this illness because it is usually viral.Simple home treatment will be helpful.  Symptoms of viral gastroenteritis may include:  · Watery, loose stools  · Stomach pain or abdominal cramps  · Fever and chills  · Nausea and vomiting  · Loss of bowel control  · Headache  Home care  Gastroenteritis is transmitted by contact with the stool or vomit of an infected person. This can occur from person to person or from contact with a contaminated surface.  Follow these guidelines when caring for yourself at home:  · If symptoms are severe, rest at home for the next 24 hours or until you are feeling better.  · Wash your hands with soap and water or use alcohol-based  to prevent the spread of infection. Wash your hands after touching anyone who is sick.  · Wash your hands or use alcohol-based  after using the toilet and before meals. Clean the toilet after each use.  Remember these tips when preparing food:  · People with diarrhea should not prepare or serve food to others. When preparing foods, wash your hands before and after.  · Wash your hands after using cutting boards, countertops, knives, or utensils that have been in contact with raw food.  · Keep uncooked meats away from cooked and ready-to-eat foods.  Medicine  You may use acetaminophen or NSAID medicines like ibuprofen or naproxen to  control fever unless another medicine was given. If you have chronic liver or kidney disease, talk with your healthcare provider before using these medicines. Also talk with your provider if you've had a stomach ulcer or gastrointestinal bleeding. Don't give aspirin to anyone under 18 years of age who is ill with a fever. It may cause severe liver damage. Don't use NSAIDS is you are already taking one for another condition (like arthritis) or are on aspirin (such as for heart disease or after a stroke).  If medicine for vomiting or diarrhea are prescribed, take these only as directed. Do not take over-the-counter medicines for vomiting or diarrhea unless instructed by your healthcare provider.  Diet  Follow these guidelines for food:  · Water and liquids are important so you don't get dehydrated. Drink a small amount at a time or suck on ice chips if you are vomiting.  · If you eat, avoid fatty, greasy, spicy, or fried foods.  · Don't eat dairy if you have diarrhea. This can make diarrhea worse.  · Avoid tobacco, alcohol, and caffeine which may worsen symptoms.  During the first 24 hours (the first full day), follow the diet below:  · Beverages. Sports drinks, soft drinks without caffeine, ginger ale, mineral water (plain or flavored), decaffeinated tea and coffee. If you are very dehydrated, sports drinks aren't a good choice. They have too much sugar and not enough electrolytes. In this case, commercially available products called oral rehydration solutions, are best.  · Soups. Eat clear broth, consommé, and bouillon.  · Desserts. Eat gelatin, popsicles, and fruit juice bars.  During the next 24 hours (the second day), you may add the following to the above:  · Hot cereal, plain toast, bread, rolls, and crackers  · Plain noodles, rice, mashed potatoes, chicken noodle or rice soup  · Unsweetened canned fruit (avoid pineapple), bananas  · Limit fat intake to less than 15 grams per day. Do this by avoiding margarine,  butter, oils, mayonnaise, sauces, gravies, fried foods, peanut butter, meat, poultry, and fish.  · Limit fiber and avoid raw or cooked vegetables, fresh fruits (except bananas), and bran cereals.  · Limit caffeine and chocolate. Don't use spices or seasonings other than salt.  · Limit dairy products.  · Avoid alcohol.  During the next 24 hours:  · Gradually resume a normal diet as you feel better and your symptoms improve.  · If at any time it starts getting worse again, go back to clear liquids until you feel better.  Follow-up care  Follow up with your healthcare provider, or as advised. Call your provider if you don't get better within 24 hours or if diarrhea lasts more than a week. Also follow up if you are unable to keep down liquids and get dehydrated. If a stool (diarrhea) sample was taken, call as directed for the results.  Call 911  Call 911 if any of these occur:  · Trouble breathing  · Chest pain  · Confused  · Severe drowsiness or trouble awakening  · Fainting or loss of consciousness  · Rapid heart rate  · Seizure  · Stiff neck  When to seek medical advice  Call your healthcare provider right away if any of these occur:  · Abdominal pain that gets worse  · Continued vomiting (unable to keep liquids down)  · Frequent diarrhea (more than 5 times a day)  · Blood in vomit or stool (black or red color)  · Dark urine, reduced urine output, or extreme thirst  · Weakness or dizziness  · Drowsiness  · Fever of 100.4°F (38°C) oral or higher that does not get better with fever medicine  · New rash  Date Last Reviewed: 1/3/2016  © 9979-7882 [employer]. [address] [place] [address], [place] [address]. All rights reserved. This information is not intended as a substitute for professional medical care. Always follow your healthcare professional's instructions.

## 2018-04-20 DIAGNOSIS — E78.5 HYPERLIPIDEMIA ASSOCIATED WITH TYPE 2 DIABETES MELLITUS: ICD-10-CM

## 2018-04-20 DIAGNOSIS — E11.69 HYPERLIPIDEMIA ASSOCIATED WITH TYPE 2 DIABETES MELLITUS: ICD-10-CM

## 2018-04-20 RX ORDER — ATORVASTATIN CALCIUM 80 MG/1
80 TABLET, FILM COATED ORAL DAILY
Qty: 90 TABLET | Refills: 3 | Status: SHIPPED | OUTPATIENT
Start: 2018-04-20 | End: 2019-01-28 | Stop reason: SDUPTHER

## 2018-04-20 NOTE — TELEPHONE ENCOUNTER
----- Message from Carol Morillo sent at 4/20/2018  1:11 PM CDT -----  Contact: pt wife  Stated she was calling for a refill on medication and she wants to pick it up at [facility] , she can be reached at  [phone]     1. What is the name of the medication you are requesting?  atorvastatin calcium   2. What is the dose? 80 mg  3. How do you take the medication? Orally, topically, etc? orally  4. How often do you take this medication? 1 a day  5. Do you need a 30 day or 90 day supply? 90  6. How many refills are you requesting?   7. What is your preferred pharmacy and location of the pharmacy?   8. Who can we contact with further questions?       [facility] [address] [place] [address] [address] [address]  Phone: [phone] Fax: [phone]

## 2018-04-30 ENCOUNTER — OFFICE VISIT (OUTPATIENT)
Dept: FAMILY MEDICINE | Facility: CLINIC | Age: 68
End: 2018-04-30
Payer: COMMERCIAL

## 2018-04-30 ENCOUNTER — PATIENT OUTREACH (OUTPATIENT)
Dept: ADMINISTRATIVE | Facility: HOSPITAL | Age: 68
End: 2018-04-30

## 2018-04-30 ENCOUNTER — TELEPHONE (OUTPATIENT)
Dept: FAMILY MEDICINE | Facility: CLINIC | Age: 68
End: 2018-04-30

## 2018-04-30 VITALS
WEIGHT: 284.19 LBS | HEIGHT: 70 IN | BODY MASS INDEX: 40.69 KG/M2 | SYSTOLIC BLOOD PRESSURE: 98 MMHG | DIASTOLIC BLOOD PRESSURE: 52 MMHG | TEMPERATURE: 98 F | HEART RATE: 48 BPM

## 2018-04-30 DIAGNOSIS — J01.90 ACUTE NON-RECURRENT SINUSITIS, UNSPECIFIED LOCATION: Primary | ICD-10-CM

## 2018-04-30 DIAGNOSIS — R00.1 BRADYCARDIA: ICD-10-CM

## 2018-04-30 DIAGNOSIS — I95.9 HYPOTENSION, UNSPECIFIED HYPOTENSION TYPE: ICD-10-CM

## 2018-04-30 DIAGNOSIS — I15.2 HYPERTENSION ASSOCIATED WITH DIABETES: ICD-10-CM

## 2018-04-30 DIAGNOSIS — E11.59 HYPERTENSION ASSOCIATED WITH DIABETES: ICD-10-CM

## 2018-04-30 PROCEDURE — 99214 OFFICE O/P EST MOD 30 MIN: CPT | Mod: S$GLB,,, | Performed by: NURSE PRACTITIONER

## 2018-04-30 PROCEDURE — 3074F SYST BP LT 130 MM HG: CPT | Mod: CPTII,S$GLB,, | Performed by: NURSE PRACTITIONER

## 2018-04-30 PROCEDURE — 3044F HG A1C LEVEL LT 7.0%: CPT | Mod: CPTII,S$GLB,, | Performed by: NURSE PRACTITIONER

## 2018-04-30 PROCEDURE — 99999 PR PBB SHADOW E&M-EST. PATIENT-LVL V: CPT | Mod: PBBFAC,,, | Performed by: NURSE PRACTITIONER

## 2018-04-30 PROCEDURE — 3078F DIAST BP <80 MM HG: CPT | Mod: CPTII,S$GLB,, | Performed by: NURSE PRACTITIONER

## 2018-04-30 RX ORDER — AMOXICILLIN 875 MG/1
875 TABLET, FILM COATED ORAL 2 TIMES DAILY
Qty: 14 TABLET | Refills: 0 | Status: SHIPPED | OUTPATIENT
Start: 2018-04-30 | End: 2018-05-14

## 2018-04-30 RX ORDER — GUAIFENESIN 600 MG/1
1200 TABLET, EXTENDED RELEASE ORAL 2 TIMES DAILY
Qty: 20 TABLET | Refills: 0 | COMMUNITY
Start: 2018-04-30 | End: 2018-05-07

## 2018-04-30 RX ORDER — SILDENAFIL 100 MG/1
50 TABLET, FILM COATED ORAL
COMMUNITY
Start: 2018-02-27

## 2018-04-30 NOTE — LETTER
April 30, 2018    Alfredo Brennan  [address]  [place] [address]           [facility]  [address]  [place] [address]  Phone: [phone] Dear Mr. Brennan:    [facility] is committed to your overall health.  To help you get the most out of each of your visits, we will review your information to make sure you are up to date on all of your recommended tests and/or procedures.      Ángel Colon MD has found that you may be due for   Health Maintenance Due   Topic    TETANUS VACCINE     Colonoscopy     Eye Exam       If you have had any of the above done at another facility, please bring the records or information with you so that your record at [facility] will be complete.    If you are currently taking medication, please bring it with you to your appointment for review.    We will be happy to assist you with scheduling any necessary appointments or you may contact the [facility] appointment desk at [phone] to schedule at your convenience.     Thank you for choosing [facility] for your healthcare needs,    If you have any questions or concerns, please don't hesitate to call.    Sincerely,    WILY Avery  Care Coordination Department  [facility]  [phone]

## 2018-04-30 NOTE — TELEPHONE ENCOUNTER
----- Message from Zenaida Venegas sent at 4/30/2018 10:22 AM CDT -----  Contact: pt   Pt states that he need to get fitted in today to see the doctor. Pt states that he has a respiratory illness.   [phone] (home)

## 2018-04-30 NOTE — PATIENT INSTRUCTIONS
Hold Carvedilol, call Dr Whitlock and schedule follow up for 1 week    Over the counter Delsym as needed for cough

## 2018-04-30 NOTE — PROGRESS NOTES
Subjective:       Patient ID: Alfredo Brennan is a 68 y.o. male.    Chief Complaint: Cough and Chest Congestion    Sinus Problem   This is a new problem. The current episode started in the past 7 days. The problem has been gradually worsening since onset. There has been no fever. The pain is mild. Associated symptoms include congestion, coughing, shortness of breath and sinus pressure. Pertinent negatives include no ear pain, headaches or sore throat. (Chest congestion, dizziness, tinnitus) Treatments tried: tessalon perles. The treatment provided no relief.   Hypertension   This is a chronic problem. The current episode started more than 1 year ago. Associated symptoms include malaise/fatigue and shortness of breath. Pertinent negatives include no chest pain, headaches or palpitations. (Dizziness) Past treatments include beta blockers, diuretics, ACE inhibitors and direct vasodilators.       Review of Systems   Constitutional: Positive for fatigue and malaise/fatigue. Negative for fever and unexpected weight change.   HENT: Positive for congestion and sinus pressure. Negative for ear pain and sore throat.    Eyes: Negative.  Negative for pain and visual disturbance.   Respiratory: Positive for cough and shortness of breath.    Cardiovascular: Negative for chest pain and palpitations.   Gastrointestinal: Negative for abdominal pain, diarrhea, nausea and vomiting.   Genitourinary: Negative for dysuria and frequency.   Musculoskeletal: Negative for arthralgias and myalgias.   Skin: Negative for color change and rash.   Neurological: Positive for dizziness and weakness. Negative for headaches.   Psychiatric/Behavioral: Negative for sleep disturbance. The patient is not nervous/anxious.        Vitals:    04/30/18 1529   BP: (!) 98/52   Pulse:    Temp:        Objective:     Current Outpatient Prescriptions   Medication Sig Dispense Refill    albuterol 90 mcg/actuation inhaler Inhale 2 puffs into the lungs every 6 (six)  "hours as needed for Wheezing. 18 g 1    amiodarone (PACERONE) 200 MG Tab Take 200 mg by mouth once daily.  60 tablet 11    atorvastatin (LIPITOR) 80 MG tablet Take 1 tablet (80 mg total) by mouth once daily. 90 tablet 3    benzonatate (TESSALON) 100 MG capsule TAKE ONE CAPSULE BY MOUTH EVERY 8 HOURS AS NEEDED FOR COUGH 100 capsule 3    BREEZE 2 TEST STRIPS Strp USE ONE STRIP TO CHECK GLUCOSE 3 TO 4 TIMES DAILY AS NEEDED 100 strip 11    calcitRIOL (ROCALTROL) 0.25 MCG Cap Use on daily during the week and two daily on weekends. 108 capsule 3    carvedilol (COREG) 3.125 MG tablet Take half in am and take half in pm 90 tablet 3    docusate sodium (STOOL SOFTENER) 100 MG capsule 3 (three) times daily as needed. Every day      famotidine (PEPCID) 20 MG tablet 2 (two) times daily. Every day      FLUZONE HIGH-DOSE 2017-18, PF, 180 mcg/0.5 mL vaccine       furosemide (LASIX) 40 MG tablet Take one tablet in am 90 tablet 3    guaiFENesin (MUCINEX) 600 mg 12 hr tablet Take 600 mg by mouth.      insulin glargine (LANTUS SOLOSTAR) 100 unit/mL (3 mL) InPn pen INJECT 52 UNITS            SUBCUTANEOUSLY ONCE DAILY 45 mL 6    metOLazone (ZAROXOLYN) 5 MG tablet Take 1 tablet (5 mg total) by mouth daily as needed. 90 tablet 3    nitroGLYCERIN (NITROSTAT) 0.4 MG SL tablet Place 0.4 mg under the tongue every 5 (five) minutes as needed for Chest pain.      pen needle, diabetic (PEN NEEDLE) 31 gauge x 5/16" Ndle USE ONE SYRINGE EVERY  each 3    promethazine (PHENERGAN) 25 MG tablet Take 1 tablet (25 mg total) by mouth every 6 (six) hours as needed for Nausea. 6 tablet 0    ramipril (ALTACE) 1.25 MG capsule Take 1 capsule (1.25 mg total) by mouth once daily. 90 capsule 3    sildenafil (VIAGRA) 100 MG tablet Take 50 mg by mouth.      spironolactone (ALDACTONE) 25 MG tablet Take 0.5 tablets (12.5 mg total) by mouth once daily. 1/2 Every day 45 tablet 3    XARELTO 15 mg Tab Take 1 tablet (15 mg total) by mouth once " daily. 90 tablet 3    amoxicillin (AMOXIL) 875 MG tablet Take 1 tablet (875 mg total) by mouth 2 (two) times daily. 14 tablet 0    guaiFENesin (MUCINEX) 600 mg 12 hr tablet Take 2 tablets (1,200 mg total) by mouth 2 (two) times daily. 20 tablet 0    LANCETS & BLOOD GLUCOSE STRIPS MISC qid      polyethylene glycol (GOLYTELY,NULYTELY) 236-22.74-6.74 -5.86 gram suspension Drink 1 glass every 15 minutes starting at 5PM the night before the procedure until the bottle is empty. 4000 mL 0     No current facility-administered medications for this visit.        Physical Exam   Constitutional: He is oriented to person, place, and time. He appears well-developed. No distress.   HENT:   Head: Normocephalic and atraumatic.   Right Ear: Tympanic membrane is bulging.   Left Ear: Tympanic membrane normal.   Nose: Mucosal edema and rhinorrhea present.   Mouth/Throat: Posterior oropharyngeal edema (post nasal mucus) present.   Eyes: EOM are normal. Pupils are equal, round, and reactive to light.   Neck: Normal range of motion. Neck supple.   Cardiovascular: Regular rhythm.  Bradycardia present.    Pulmonary/Chest: Effort normal and breath sounds normal.   Loose cough   Musculoskeletal: Normal range of motion.   Neurological: He is alert and oriented to person, place, and time.   Skin: Skin is warm and dry. No rash noted.   Psychiatric: He has a normal mood and affect. Thought content normal.   Nursing note and vitals reviewed.      Assessment:       1. Acute non-recurrent sinusitis, unspecified location    2. Bradycardia    3. Hypertension associated with diabetes    4. Hypotension, unspecified hypotension type        Plan:   Acute non-recurrent sinusitis, unspecified location    Bradycardia    Hypertension associated with diabetes    Hypotension, unspecified hypotension type    Other orders  -     amoxicillin (AMOXIL) 875 MG tablet; Take 1 tablet (875 mg total) by mouth 2 (two) times daily.  Dispense: 14 tablet; Refill: 0  -      guaiFENesin (MUCINEX) 600 mg 12 hr tablet; Take 2 tablets (1,200 mg total) by mouth 2 (two) times daily.  Dispense: 20 tablet; Refill: 0      Patient Instructions   Hold Carvedilol, call Dr Whitlock and schedule follow up for 1 week    Over the counter Delsym as needed for cough        No Follow-up on file.

## 2018-05-08 LAB — HBA1C MFR BLD: 6.4 %

## 2018-05-14 ENCOUNTER — OFFICE VISIT (OUTPATIENT)
Dept: FAMILY MEDICINE | Facility: CLINIC | Age: 68
End: 2018-05-14
Payer: COMMERCIAL

## 2018-05-14 VITALS
HEART RATE: 59 BPM | DIASTOLIC BLOOD PRESSURE: 55 MMHG | SYSTOLIC BLOOD PRESSURE: 96 MMHG | TEMPERATURE: 98 F | WEIGHT: 286 LBS | HEIGHT: 70 IN | BODY MASS INDEX: 40.94 KG/M2

## 2018-05-14 DIAGNOSIS — E11.9 TYPE 2 DIABETES MELLITUS WITHOUT COMPLICATION, WITH LONG-TERM CURRENT USE OF INSULIN: ICD-10-CM

## 2018-05-14 DIAGNOSIS — N18.30 CONTROLLED TYPE 2 DIABETES MELLITUS WITH STAGE 3 CHRONIC KIDNEY DISEASE, WITH LONG-TERM CURRENT USE OF INSULIN: ICD-10-CM

## 2018-05-14 DIAGNOSIS — E78.5 HYPERLIPIDEMIA ASSOCIATED WITH TYPE 2 DIABETES MELLITUS: ICD-10-CM

## 2018-05-14 DIAGNOSIS — N18.30 CKD STAGE 3 DUE TO TYPE 2 DIABETES MELLITUS: ICD-10-CM

## 2018-05-14 DIAGNOSIS — Z79.4 CONTROLLED TYPE 2 DIABETES MELLITUS WITH STAGE 3 CHRONIC KIDNEY DISEASE, WITH LONG-TERM CURRENT USE OF INSULIN: ICD-10-CM

## 2018-05-14 DIAGNOSIS — E03.9 ACQUIRED HYPOTHYROIDISM: ICD-10-CM

## 2018-05-14 DIAGNOSIS — E11.22 CONTROLLED TYPE 2 DIABETES MELLITUS WITH STAGE 3 CHRONIC KIDNEY DISEASE, WITH LONG-TERM CURRENT USE OF INSULIN: ICD-10-CM

## 2018-05-14 DIAGNOSIS — E11.69 HYPERLIPIDEMIA ASSOCIATED WITH TYPE 2 DIABETES MELLITUS: ICD-10-CM

## 2018-05-14 DIAGNOSIS — Z79.4 TYPE 2 DIABETES MELLITUS WITHOUT COMPLICATION, WITH LONG-TERM CURRENT USE OF INSULIN: ICD-10-CM

## 2018-05-14 DIAGNOSIS — E11.22 CKD STAGE 3 DUE TO TYPE 2 DIABETES MELLITUS: ICD-10-CM

## 2018-05-14 PROCEDURE — 3044F HG A1C LEVEL LT 7.0%: CPT | Mod: CPTII,S$GLB,, | Performed by: FAMILY MEDICINE

## 2018-05-14 PROCEDURE — 99213 OFFICE O/P EST LOW 20 MIN: CPT | Mod: S$GLB,,, | Performed by: FAMILY MEDICINE

## 2018-05-14 PROCEDURE — 99999 PR PBB SHADOW E&M-EST. PATIENT-LVL IV: CPT | Mod: PBBFAC,,, | Performed by: FAMILY MEDICINE

## 2018-05-14 PROCEDURE — 3078F DIAST BP <80 MM HG: CPT | Mod: CPTII,S$GLB,, | Performed by: FAMILY MEDICINE

## 2018-05-14 PROCEDURE — 3074F SYST BP LT 130 MM HG: CPT | Mod: CPTII,S$GLB,, | Performed by: FAMILY MEDICINE

## 2018-05-14 RX ORDER — LEVOTHYROXINE SODIUM 75 UG/1
1 CAPSULE ORAL DAILY
Qty: 90 CAPSULE | Refills: 3 | Status: SHIPPED | OUTPATIENT
Start: 2018-05-14 | End: 2018-08-01 | Stop reason: DRUGHIGH

## 2018-05-14 NOTE — PROGRESS NOTES
Subjective:      Patient ID: Alfredo Brennan is a 68 y.o. male.    Chief Complaint: Annual Exam    Problem List Items Addressed This Visit     Acquired hypothyroidism    Overview     [facility] and he had a TSH of 10.24.  He has had a low pulse and bp and he has had to come off of his coreg some due to this with his cardiologist.  He is better on the bp and pulse now but they are still on the low end.  He has not had constipation.  He does not have swelling of the legs.           Relevant Medications    levothyroxine 75 mcg Cap    Other Relevant Orders    TSH    CKD stage 3 due to type 2 diabetes mellitus    Overview       He has CKD and he is followed by Dr. Kendall on this.  He has never had dialysis.    He had a CREAT of 1.9 on may 8 2018.           DM (diabetes mellitus)    Overview     The patient presents with diabetes.  The patient denies polyuria, polydipsia, polyphagia, hypoglycemia and paresthesias.  The patient's glucose control has been good.  Home glucose averages are routinely checked.  The patient is without retinopathy currently.  The patient has no history of neuropathy.  The patient currently complains of no podiatric problems.  The patient has excellent compliance.  The a1c was 6.4 on 5/8/2018.         Hyperlipidemia associated with type 2 diabetes mellitus    Overview     The patient presents with hyperlipidemia.  The patient reports tolerating the medication well and is in excellent compliance.  There have been no medication side effects.  The patient denies chest pain, neuropathy, and myalgias.  The patient has reduced fat intake and has been exercising.  Current treatment has included the medications listed in the med card.     He had an LDL on 5/8/2018 and it was 58.1.  The total chol was 107.the TG was 125.           Other Visit Diagnoses     Controlled type 2 diabetes mellitus with stage 3 chronic kidney disease, with long-term current use of insulin               Past Medical History:  Past Medical History:   Diagnosis Date    Atrial fibrillation     CAD (coronary artery disease)     CKD stage 3 due to type 2 diabetes mellitus 1/4/2018    Lab Results Component Value Date  CREATININE 2.04 (H) 01/03/2018  BUN 22 01/03/2018   01/03/2018  K 4.2 01/03/2018   01/03/2018  CO2 30 01/03/2018  BMP Lab Results Component Value Date   01/03/2018  K 4.2 01/03/2018   01/03/2018  CO2 30 01/03/2018  BUN 22 01/03/2018  CREATININE 2.04 (H) 01/03/2018  CALCIUM 8.7 01/03/2018  ANIONGAP 8 01/27/2015  ESTGFRAFRICA 38 (L) 01/03/201    Diabetes mellitus     Diabetes mellitus type II     Elevated PSA     History of colon polyps 1/8/2018    He has had colon polyps in the past and he is screened serially for this.     Hyperlipidemia associated with type 2 diabetes mellitus 7/10/2012    Hyperlipidemia LDL goal < 70     Hypertension     Hypertension goal BP (blood pressure) < 130/80     Mass of adrenal gland     Mild acid reflux     Skin cancer (melanoma)      Past Surgical History:   Procedure Laterality Date    CARDIAC CATHETERIZATION  2/2015    CARDIOVERSION      CHOLECYSTECTOMY      CORONARY ARTERY BYPASS GRAFT      EYE SURGERY      PROSTATE SURGERY      radiation treatment      SKIN CANCER EXCISION      TONSILLECTOMY       Review of patient's allergies indicates:   Allergen Reactions    Codeine      Other reaction(s): Hallucinations    Fish oil Rash and Itching     Current Outpatient Prescriptions on File Prior to Visit   Medication Sig Dispense Refill    albuterol 90 mcg/actuation inhaler Inhale 2 puffs into the lungs every 6 (six) hours as needed for Wheezing. 18 g 1    amiodarone (PACERONE) 200 MG Tab Take 200 mg by mouth once daily.  60 tablet 11    atorvastatin (LIPITOR) 80 MG tablet Take 1 tablet (80 mg total) by mouth once daily. 90 tablet 3    benzonatate (TESSALON) 100 MG capsule TAKE ONE CAPSULE BY MOUTH EVERY 8 HOURS AS NEEDED FOR  "COUGH 100 capsule 3    BREEZE 2 TEST STRIPS Str USE ONE STRIP TO CHECK GLUCOSE 3 TO 4 TIMES DAILY AS NEEDED 100 strip 11    calcitRIOL (ROCALTROL) 0.25 MCG Cap Use on daily during the week and two daily on weekends. 108 capsule 3    docusate sodium (STOOL SOFTENER) 100 MG capsule 3 (three) times daily as needed. Every day      famotidine (PEPCID) 20 MG tablet 2 (two) times daily. Every day      FLUZONE HIGH-DOSE 2017-18, PF, 180 mcg/0.5 mL vaccine       furosemide (LASIX) 40 MG tablet Take one tablet in am 90 tablet 3    guaiFENesin (MUCINEX) 600 mg 12 hr tablet Take 600 mg by mouth.      insulin glargine (LANTUS SOLOSTAR) 100 unit/mL (3 mL) InPn pen INJECT 52 UNITS            SUBCUTANEOUSLY ONCE DAILY 45 mL 6    LANCETS & BLOOD GLUCOSE STRIPS MISC qid      metOLazone (ZAROXOLYN) 5 MG tablet Take 1 tablet (5 mg total) by mouth daily as needed. 90 tablet 3    nitroGLYCERIN (NITROSTAT) 0.4 MG SL tablet Place 0.4 mg under the tongue every 5 (five) minutes as needed for Chest pain.      pen needle, diabetic (PEN NEEDLE) 31 gauge x 5/16" Ndle USE ONE SYRINGE EVERY  each 3    ramipril (ALTACE) 1.25 MG capsule Take 1 capsule (1.25 mg total) by mouth once daily. 90 capsule 3    sildenafil (VIAGRA) 100 MG tablet Take 50 mg by mouth.      spironolactone (ALDACTONE) 25 MG tablet Take 0.5 tablets (12.5 mg total) by mouth once daily. 1/2 Every day 45 tablet 3    XARELTO 15 mg Tab Take 1 tablet (15 mg total) by mouth once daily. 90 tablet 3    carvedilol (COREG) 3.125 MG tablet Take half in am and take half in pm 90 tablet 3    polyethylene glycol (GOLYTELY,NULYTELY) 236-22.74-6.74 -5.86 gram suspension Drink 1 glass every 15 minutes starting at 5PM the night before the procedure until the bottle is empty. 4000 mL 0    promethazine (PHENERGAN) 25 MG tablet Take 1 tablet (25 mg total) by mouth every 6 (six) hours as needed for Nausea. 6 tablet 0    [DISCONTINUED] amoxicillin (AMOXIL) 875 MG tablet Take " "1 tablet (875 mg total) by mouth 2 (two) times daily. 14 tablet 0     No current facility-administered medications on file prior to visit.      Social History     Social History    Marital status:      Spouse name: N/A    Number of children: N/A    Years of education: N/A     Occupational History    Not on file.     Social History Main Topics    Smoking status: Never Smoker    Smokeless tobacco: Never Used    Alcohol use Yes    Drug use: No    Sexual activity: Not on file      Comment: once monthly     Other Topics Concern    Not on file     Social History Narrative    No narrative on file     Family History   Problem Relation Age of Onset    Stroke Mother     Hypertension Mother     Heart disease Father     Coronary artery disease Father     Diabetes Father     Heart disease Paternal Aunt     Stroke Maternal Grandmother        Review of Systems   Constitutional: Negative.  Negative for chills, diaphoresis and fever.   HENT: Negative for congestion, hearing loss, mouth sores, postnasal drip and sore throat.    Eyes: Negative for pain and visual disturbance.   Respiratory: Negative for cough, chest tightness, shortness of breath and wheezing.    Cardiovascular: Negative for chest pain.   Gastrointestinal: Negative for abdominal pain, anal bleeding, blood in stool, constipation, diarrhea, nausea and vomiting.   Genitourinary: Negative for dysuria and hematuria.   Musculoskeletal: Negative for back pain, neck pain and neck stiffness.   Skin: Negative for rash.   Neurological: Negative for dizziness and weakness.       Objective:     BP (!) 96/55   Pulse (!) 59   Temp 98.3 °F (36.8 °C) (Oral)   Ht 5' 10" (1.778 m)   Wt 129.7 kg (286 lb)   BMI 41.04 kg/m²     Physical Exam   Constitutional: He is oriented to person, place, and time. He appears well-developed and well-nourished.   HENT:   Head: Normocephalic and atraumatic.   Right Ear: External ear normal.   Left Ear: External ear normal. "   Nose: Nose normal.   Mouth/Throat: Oropharynx is clear and moist. No oropharyngeal exudate.   Eyes: Conjunctivae and EOM are normal. Pupils are equal, round, and reactive to light. Right eye exhibits no discharge. Left eye exhibits no discharge. No scleral icterus.   Neck: Normal range of motion. Neck supple. No JVD present. No thyromegaly present.   Cardiovascular: Normal rate, regular rhythm, normal heart sounds and intact distal pulses.  Exam reveals no gallop and no friction rub.    No murmur heard.  Pulmonary/Chest: Effort normal and breath sounds normal. No respiratory distress. He has no wheezes. He has no rales. He exhibits no tenderness.   Abdominal: Soft. Bowel sounds are normal. He exhibits no distension and no mass. There is no tenderness. There is no rebound and no guarding.   Musculoskeletal: Normal range of motion. He exhibits no edema or tenderness.   Lymphadenopathy:     He has no cervical adenopathy.   Neurological: He is alert and oriented to person, place, and time. No cranial nerve deficit. Coordination normal.   Skin: Skin is warm and dry. He is not diaphoretic.   Psychiatric: He has a normal mood and affect.     Assessment:     1. Hyperlipidemia associated with type 2 diabetes mellitus    2. Acquired hypothyroidism    3. CKD stage 3 due to type 2 diabetes mellitus        Plan:     Problem List Items Addressed This Visit     Acquired hypothyroidism    Relevant Medications    levothyroxine 75 mcg Cap    Other Relevant Orders    TSH    CKD stage 3 due to type 2 diabetes mellitus    Hyperlipidemia associated with type 2 diabetes mellitus        Alfredo was seen today for annual exam.    Diagnoses and all orders for this visit:    Hyperlipidemia associated with type 2 diabetes mellitus    Acquired hypothyroidism  -     levothyroxine 75 mcg Cap; Take 1 tablet by mouth once daily.  -     TSH; Future    CKD stage 3 due to type 2 diabetes mellitus      Cont to f/u with dr. Kendall and also get routine  checks of the kidneys every 3-6 months.     Check a lipid in 1 year.  No Follow-up on file.

## 2018-05-30 DIAGNOSIS — D17.79 MYELOLIPOMA OF ADRENAL GLAND: Primary | ICD-10-CM

## 2018-06-01 ENCOUNTER — TELEPHONE (OUTPATIENT)
Dept: UROLOGY | Facility: CLINIC | Age: 68
End: 2018-06-01

## 2018-06-01 NOTE — TELEPHONE ENCOUNTER
----- Message from Halina Fuentes sent at 6/1/2018  4:17 PM CDT -----  Contact: Evy Brennan (Spouse)            Name of Who is Calling: Evy Brennan (Spouse)      What is the request in detail: Pt's wife states the pt had an MRI done today and she wants to know if they still need to have an MRI on 06/12. Please call.      Can the clinic reply by [facility]: N      What Number to Call Back if not in [facility]: [phone]

## 2018-06-01 NOTE — TELEPHONE ENCOUNTER
Spoke to patient wife and was informed that the patient had a mri done on 06/01/18 at [facility] , they have the cd and the report. Patient states do they need to still have the mri done on Tuesday before the appointment,Informed the patient wife that the doctor was out until on Monday and we will call them back.

## 2018-06-04 ENCOUNTER — TELEPHONE (OUTPATIENT)
Dept: UROLOGY | Facility: CLINIC | Age: 68
End: 2018-06-04

## 2018-06-04 NOTE — TELEPHONE ENCOUNTER
LMOR for pt's wife.  Dr. Randolph ordered MRI of the abdomen.  If this was done he does not need to have another.  LM that I will call her back around 1PM.

## 2018-06-04 NOTE — TELEPHONE ENCOUNTER
Spoke with pts wife. She states that she will bring in pts MRI of the abd area and pts appointment time has been changed as requested to 9:15.

## 2018-06-04 NOTE — TELEPHONE ENCOUNTER
LMOR again letting wife know that as long as there was an MRI of the Abdomen done, this would suffice. Asked to bring disk and report.

## 2018-06-04 NOTE — TELEPHONE ENCOUNTER
----- Message from [place] sent at 6/4/2018  2:31 PM CDT -----  Contact: Evy, patient's wife            Name of Who is Calling: Alfredo Brennan      What is the request in detail: Patient's wife called to see if MRI done at another location will be good for patient, patient also wants to move appt to an earlier time if he can      Can the clinic reply by [facility]: No      What Number to Call Back if not in [facility]: [phone]

## 2018-06-04 NOTE — TELEPHONE ENCOUNTER
----- Message from Ernestina Shah sent at 6/4/2018  4:51 PM CDT -----  Contact: Evy Brennan Spouse             Name of Who is Calling: Evy Brennan Spouse     What is the request in detail: Pt wife is calling back to speak with clinical staff regarding voicemail that was left for pt.  Please contact pt wife at number list below to further discuss and advise.    Can the clinic reply by [facility]: No    What [facility]: [phone]

## 2018-06-04 NOTE — TELEPHONE ENCOUNTER
LM for pts wife that MRi done else wear Is ok but patient will have to bring report and cd to appt.

## 2018-06-04 NOTE — TELEPHONE ENCOUNTER
----- Message from Milly Royal sent at 6/4/2018  4:14 PM CDT -----  Contact: pt's wife            Name of Who is Calling: MIRANDA ALCOCER [[id number]]      What is the request in detail: pt's wife returning call in regards to upcoming appts.. Please advise    Can the clinic reply by [facility]: no       What Number to Call Back if not in [facility]: [phone]

## 2018-06-05 ENCOUNTER — TELEPHONE (OUTPATIENT)
Dept: FAMILY MEDICINE | Facility: CLINIC | Age: 68
End: 2018-06-05

## 2018-06-05 NOTE — TELEPHONE ENCOUNTER
----- Message from Patricia Villarreal sent at 6/5/2018 10:23 AM CDT -----  Contact: [phone]  Pt is asking if the doctor will accept his wife as a patient on his panel/please call to advise/thanks

## 2018-06-12 ENCOUNTER — OFFICE VISIT (OUTPATIENT)
Dept: UROLOGY | Facility: CLINIC | Age: 68
End: 2018-06-12
Payer: COMMERCIAL

## 2018-06-12 VITALS
BODY MASS INDEX: 40.81 KG/M2 | DIASTOLIC BLOOD PRESSURE: 67 MMHG | SYSTOLIC BLOOD PRESSURE: 105 MMHG | HEIGHT: 70 IN | WEIGHT: 285.06 LBS | HEART RATE: 47 BPM

## 2018-06-12 DIAGNOSIS — D17.79 MYELOLIPOMA OF LEFT ADRENAL GLAND: Primary | ICD-10-CM

## 2018-06-12 LAB
BILIRUB SERPL-MCNC: ABNORMAL MG/DL
BLOOD URINE, POC: ABNORMAL
COLOR, POC UA: ABNORMAL
GLUCOSE UR QL STRIP: ABNORMAL
KETONES UR QL STRIP: ABNORMAL
LEUKOCYTE ESTERASE URINE, POC: ABNORMAL
NITRITE, POC UA: ABNORMAL
PH, POC UA: 5
PROTEIN, POC: ABNORMAL
SPECIFIC GRAVITY, POC UA: 1.02
UROBILINOGEN, POC UA: ABNORMAL

## 2018-06-12 PROCEDURE — 3078F DIAST BP <80 MM HG: CPT | Mod: CPTII,S$GLB,, | Performed by: UROLOGY

## 2018-06-12 PROCEDURE — 3074F SYST BP LT 130 MM HG: CPT | Mod: CPTII,S$GLB,, | Performed by: UROLOGY

## 2018-06-12 PROCEDURE — 81002 URINALYSIS NONAUTO W/O SCOPE: CPT | Mod: S$GLB,,, | Performed by: UROLOGY

## 2018-06-12 PROCEDURE — 99214 OFFICE O/P EST MOD 30 MIN: CPT | Mod: 25,S$GLB,, | Performed by: UROLOGY

## 2018-06-12 RX ORDER — AMOXICILLIN 500 MG/1
175 CAPSULE ORAL 3 TIMES DAILY
COMMUNITY
End: 2018-12-04

## 2018-06-12 RX ORDER — SUCRALFATE 1 G/1
1 TABLET ORAL 4 TIMES DAILY
COMMUNITY
End: 2018-12-12

## 2018-06-12 NOTE — PROGRESS NOTES
"Subjective:      Alfredo Brennan is a 68 y.o. male who returns today regarding his     He underwent cardiac ablation 2 weeks ago.  During imaging the left adrenal myolipoma was noted on MRI.  He is referred to evaluate this.  We've been following this left adrenal myelolipoma lipoma for many years.  There are no symptoms related to it.  There is no evidence of malignancy.  Overall is been quite stable in size.    The following portions of the patient's history were reviewed and updated as appropriate: allergies, current medications, past family history, past medical history, past social history, past surgical history and problem list.    Review of Systems  Pertinent items are noted in HPI.  A comprehensive multipoint review of systems was negative except as otherwise stated in the HPI.     Objective:   Vitals: /67 (BP Location: Left arm, Patient Position: Sitting, BP Method: Large (Automatic))   Pulse (!) 47   Ht 5' 10" (1.778 m)   Wt 129.3 kg (285 lb 0.9 oz)   BMI 40.90 kg/m²     Physical Exam   General: alert and oriented, no acute distress  Respiratory: Symmetric expansion, non-labored breathing  Cardiovascular: no peripheral edema  Abdomen: non distended   No Palpable mass nontender  Skin: normal coloration and turgor, no rashes, no suspicious skin lesions noted  Neuro: no gross deficits  Psych: normal judgment and insight, normal mood/affect and non-anxious    Physical Exam    Lab Review   Urinalysis demonstrates recent leukocytes negative blood negative nitrite    Lab Results   Component Value Date    WBC 7.33 01/27/2015    HGB 12.6 (L) 01/27/2015    HCT 37.0 (L) 01/27/2015    MCV 88 01/27/2015     01/27/2015     Lab Results   Component Value Date    CREATININE 2.04 (H) 01/03/2018    CREATININE 2.0 03/06/2017    BUN 22 01/03/2018    BUN 33 03/06/2017       Imaging    Reviewed MRI from [facility].  The left adrenal myolipoma is again present.  It measures approximately 7-1/2 cm.  I " compared this with his previous CT scan and there has not been any appreciable change in size      Assessment and Plan:   Myelolipoma of left adrenal gland  -     POCT URINE DIPSTICK WITHOUT MICROSCOPE  -     CT Renal Stone Study ABD Pelvis WO; Future; Expected date: 12/12/2018      We again discussed the natural history of myelolipoma.  His case is been presented at  oncology conference.  Because the lesion is benign and not symptomatic I do not think resection is appropriate.  He will follow up in 6 months with a repeat CT scan without contrast.  On previous CT scans without contrast we've been able to identify and follow the lesion adequately

## 2018-06-18 ENCOUNTER — DOCUMENTATION ONLY (OUTPATIENT)
Dept: ENDOSCOPY | Facility: HOSPITAL | Age: 68
End: 2018-06-18

## 2018-06-18 ENCOUNTER — PATIENT OUTREACH (OUTPATIENT)
Dept: ADMINISTRATIVE | Facility: HOSPITAL | Age: 68
End: 2018-06-18

## 2018-06-18 DIAGNOSIS — E78.5 HYPERLIPIDEMIA ASSOCIATED WITH TYPE 2 DIABETES MELLITUS: Primary | ICD-10-CM

## 2018-06-18 DIAGNOSIS — N18.30 CKD STAGE 3 DUE TO TYPE 2 DIABETES MELLITUS: ICD-10-CM

## 2018-06-18 DIAGNOSIS — E11.69 HYPERLIPIDEMIA ASSOCIATED WITH TYPE 2 DIABETES MELLITUS: Primary | ICD-10-CM

## 2018-06-18 DIAGNOSIS — E11.22 CKD STAGE 3 DUE TO TYPE 2 DIABETES MELLITUS: ICD-10-CM

## 2018-06-18 NOTE — Clinical Note
Colonoscopy referral placed during last provider visit, has not been scheduled.  Message sent to GI scheduling department requesting cardio physician follow up to proceed with scheduling.

## 2018-06-18 NOTE — LETTER
2018        Alfredo Brennan,  1950, is a current patient under the care of Ángel Colon MD, primary care physician within [facility]. To help ensure our patient's jp maintenance records are accurate and updated, could you please send the following report:     LAST DIABETIC EYE EXAM REPORT    Please fax to CARE COORDINATION DEPARTMENT, [facility] at [phone].     Thank you in advance for your assistance. If you have any questions or concerns please do not hesitate to contact me.    Brynn DWYER LPN Supervisor  Care Coordination Department  [facility]   [phone]

## 2018-06-18 NOTE — PROGRESS NOTES
Health Maintenance reviewed. Record requests sent to eye clinic for updated report if available.  Colonoscopy referral placed during last provider visit, has not been scheduled.  Message sent to GI scheduling department requesting cardio physician follow up to proceed with scheduling.

## 2018-06-19 ENCOUNTER — TELEPHONE (OUTPATIENT)
Dept: FAMILY MEDICINE | Facility: CLINIC | Age: 68
End: 2018-06-19

## 2018-06-19 DIAGNOSIS — Z79.4 TYPE 2 DIABETES MELLITUS WITHOUT COMPLICATION, WITH LONG-TERM CURRENT USE OF INSULIN: Primary | ICD-10-CM

## 2018-06-19 DIAGNOSIS — E11.9 TYPE 2 DIABETES MELLITUS WITHOUT COMPLICATION, WITH LONG-TERM CURRENT USE OF INSULIN: Primary | ICD-10-CM

## 2018-06-19 NOTE — TELEPHONE ENCOUNTER
----- Message from Ángel Colon MD sent at 6/18/2018  5:08 PM CDT -----  Book a1c in 6 months.  Notify of the appointment via MyChart or letter.   Refill Diabetes Meds x 6 months.    The patient's Health Maintenance that is due is below:  TETANUS VACCINE due on 02/03/1967  Colonoscopy due on 02/09/2015  Eye Exam due on 11/30/2017  Urine Microalbumin due on 03/06/2018

## 2018-06-19 NOTE — TELEPHONE ENCOUNTER
I have signed for the following orders AND/OR meds.  Please call the patient and ask the patient to schedule the testing AND/OR inform about any medications that were sent.      Orders Placed This Encounter   Procedures    Hemoglobin A1c     Standing Status:   Standing     Number of Occurrences:   99     Standing Expiration Date:   8/18/2037

## 2018-06-20 ENCOUNTER — PATIENT OUTREACH (OUTPATIENT)
Dept: ADMINISTRATIVE | Facility: HOSPITAL | Age: 68
End: 2018-06-20

## 2018-06-20 NOTE — PROGRESS NOTES
Call pt to follow up on Colonoscopy.  Pt decline having colonoscopy scheduled at this time.  Pt has appt with Dr. Ochoa on 7/2/18. Instruct pt to discuss with Dr. Colon if would like to schedule Colonoscopy at a future time.  Health Maintenance reviewed.

## 2018-07-02 ENCOUNTER — OFFICE VISIT (OUTPATIENT)
Dept: FAMILY MEDICINE | Facility: CLINIC | Age: 68
End: 2018-07-02
Payer: COMMERCIAL

## 2018-07-02 ENCOUNTER — LAB VISIT (OUTPATIENT)
Dept: LAB | Facility: HOSPITAL | Age: 68
End: 2018-07-02
Attending: FAMILY MEDICINE
Payer: COMMERCIAL

## 2018-07-02 VITALS
WEIGHT: 285.63 LBS | HEIGHT: 70 IN | BODY MASS INDEX: 40.89 KG/M2 | TEMPERATURE: 99 F | SYSTOLIC BLOOD PRESSURE: 118 MMHG | HEART RATE: 50 BPM | DIASTOLIC BLOOD PRESSURE: 59 MMHG

## 2018-07-02 DIAGNOSIS — Z79.4 TYPE 2 DIABETES MELLITUS WITHOUT COMPLICATION, WITH LONG-TERM CURRENT USE OF INSULIN: ICD-10-CM

## 2018-07-02 DIAGNOSIS — E11.9 TYPE 2 DIABETES MELLITUS WITHOUT COMPLICATION, WITH LONG-TERM CURRENT USE OF INSULIN: ICD-10-CM

## 2018-07-02 DIAGNOSIS — K59.00 CONSTIPATION, UNSPECIFIED CONSTIPATION TYPE: Primary | ICD-10-CM

## 2018-07-02 LAB
CREAT UR-MCNC: 156 MG/DL
PROT UR-MCNC: 14 MG/DL
PROT/CREAT RATIO, UR: 0.09

## 2018-07-02 PROCEDURE — 99999 PR PBB SHADOW E&M-EST. PATIENT-LVL III: CPT | Mod: PBBFAC,,, | Performed by: FAMILY MEDICINE

## 2018-07-02 PROCEDURE — 99213 OFFICE O/P EST LOW 20 MIN: CPT | Mod: S$GLB,,, | Performed by: FAMILY MEDICINE

## 2018-07-02 PROCEDURE — 3044F HG A1C LEVEL LT 7.0%: CPT | Mod: CPTII,S$GLB,, | Performed by: FAMILY MEDICINE

## 2018-07-02 PROCEDURE — 3078F DIAST BP <80 MM HG: CPT | Mod: CPTII,S$GLB,, | Performed by: FAMILY MEDICINE

## 2018-07-02 PROCEDURE — 3074F SYST BP LT 130 MM HG: CPT | Mod: CPTII,S$GLB,, | Performed by: FAMILY MEDICINE

## 2018-07-02 PROCEDURE — 84156 ASSAY OF PROTEIN URINE: CPT

## 2018-07-02 NOTE — PROGRESS NOTES
"Subjective:      Patient ID: Alfredo Brennan is a 68 y.o. male.    Chief Complaint: Follow-up    HPI  He has had chronic constipation.   We discussed doing the colonoscopy but he has great risks with sedation, getting off of blood thinners, etc and he has had an ablation recetnly.  His last polyp was not adenomatous. I have changed his target to get a scope to 2020 which is 10 years past the last.  There is no family history of colon polyps or cancer Of the colon.   Review of Systems    Objective:   BP (!) 118/59 (BP Location: Left arm, Patient Position: Sitting, BP Method: Large (Automatic))   Pulse (!) 50   Temp 98.5 °F (36.9 °C) (Oral)   Ht 5' 10" (1.778 m)   Wt 129.5 kg (285 lb 9.6 oz)   BMI 40.98 kg/m²     Physical Exam   Constitutional: He is oriented to person, place, and time. He appears well-developed and well-nourished.   HENT:   Head: Normocephalic and atraumatic.   Eyes: EOM are normal. Pupils are equal, round, and reactive to light.   Cardiovascular: Normal rate.    Pulmonary/Chest: Effort normal.   Neurological: He is oriented to person, place, and time.       Assessment:     1. Constipation, unspecified constipation type        Plan:   Recheck his colon in 2020.   Use miralax for now and consider linzess if not better soon.    "

## 2018-07-24 ENCOUNTER — LAB VISIT (OUTPATIENT)
Dept: LAB | Facility: HOSPITAL | Age: 68
End: 2018-07-24
Attending: FAMILY MEDICINE
Payer: COMMERCIAL

## 2018-07-24 DIAGNOSIS — E03.9 ACQUIRED HYPOTHYROIDISM: ICD-10-CM

## 2018-07-24 DIAGNOSIS — E78.5 HYPERLIPIDEMIA ASSOCIATED WITH TYPE 2 DIABETES MELLITUS: ICD-10-CM

## 2018-07-24 DIAGNOSIS — E11.22 CKD STAGE 3 DUE TO TYPE 2 DIABETES MELLITUS: ICD-10-CM

## 2018-07-24 DIAGNOSIS — N18.30 CKD STAGE 3 DUE TO TYPE 2 DIABETES MELLITUS: ICD-10-CM

## 2018-07-24 DIAGNOSIS — E11.69 HYPERLIPIDEMIA ASSOCIATED WITH TYPE 2 DIABETES MELLITUS: ICD-10-CM

## 2018-07-24 LAB
CREAT UR-MCNC: 105 MG/DL
MICROALBUMIN UR DL<=1MG/L-MCNC: 27 UG/ML
MICROALBUMIN/CREATININE RATIO: 25.7 UG/MG
T4 FREE SERPL-MCNC: 1.25 NG/DL
TSH SERPL DL<=0.005 MIU/L-ACNC: 4.71 UIU/ML

## 2018-07-24 PROCEDURE — 36415 COLL VENOUS BLD VENIPUNCTURE: CPT | Mod: PO

## 2018-07-24 PROCEDURE — 84439 ASSAY OF FREE THYROXINE: CPT

## 2018-07-24 PROCEDURE — 82043 UR ALBUMIN QUANTITATIVE: CPT

## 2018-07-24 PROCEDURE — 84443 ASSAY THYROID STIM HORMONE: CPT

## 2018-08-01 DIAGNOSIS — E03.9 ACQUIRED HYPOTHYROIDISM: ICD-10-CM

## 2018-08-01 RX ORDER — LEVOTHYROXINE SODIUM 88 UG/1
88 TABLET ORAL DAILY
Qty: 30 TABLET | Refills: 1 | Status: SHIPPED | OUTPATIENT
Start: 2018-08-01 | End: 2018-10-02 | Stop reason: SDUPTHER

## 2018-08-01 NOTE — TELEPHONE ENCOUNTER
----- Message from Ángel Colon MD sent at 8/1/2018  7:18 AM CDT -----  The patient's TSH is abnormal.  Repeat TSH in 2 months and send the patient the appointment for this.  Send a prescription for the patient's thyroid medication (synthroid or levothyroxine) at a dose of 88 mcg to the pharmacy for 2 months of meds.    The patient has a normal urine protein creatinine ratio.  Please ask the patient to repeat this annually to ensure that the patient is not progressing on proteinuria which can be a side effect from diabetes and hypertension.  It would also be a sign that the patient is developing diabetic nephropathy if it were positive.

## 2018-08-01 NOTE — TELEPHONE ENCOUNTER
I have signed for the following orders AND/OR meds.  Please call the patient and ask the patient to schedule the testing AND/OR inform about any medications that were sent.      Orders Placed This Encounter   Procedures    TSH     Standing Status:   Standing     Number of Occurrences:   6     Standing Expiration Date:   9/30/2019         Medications Ordered This Encounter      levothyroxine (SYNTHROID) 88 MCG tablet          Sig: Take 1 tablet (88 mcg total) by mouth once daily.          Dispense:  30 tablet          Refill:  1

## 2018-09-06 ENCOUNTER — TELEPHONE (OUTPATIENT)
Dept: PULMONOLOGY | Facility: CLINIC | Age: 68
End: 2018-09-06

## 2018-10-01 ENCOUNTER — LAB VISIT (OUTPATIENT)
Dept: LAB | Facility: HOSPITAL | Age: 68
End: 2018-10-01
Attending: FAMILY MEDICINE
Payer: COMMERCIAL

## 2018-10-01 DIAGNOSIS — E03.9 ACQUIRED HYPOTHYROIDISM: ICD-10-CM

## 2018-10-01 LAB — TSH SERPL DL<=0.005 MIU/L-ACNC: 2.56 UIU/ML

## 2018-10-01 PROCEDURE — 36415 COLL VENOUS BLD VENIPUNCTURE: CPT | Mod: PO

## 2018-10-01 PROCEDURE — 84443 ASSAY THYROID STIM HORMONE: CPT

## 2018-10-02 RX ORDER — LEVOTHYROXINE SODIUM 88 UG/1
88 TABLET ORAL DAILY
Qty: 30 TABLET | Refills: 11 | Status: SHIPPED | OUTPATIENT
Start: 2018-10-02 | End: 2019-01-28 | Stop reason: SDUPTHER

## 2018-10-12 ENCOUNTER — TELEPHONE (OUTPATIENT)
Dept: ENDOSCOPY | Facility: HOSPITAL | Age: 68
End: 2018-10-12

## 2018-10-12 ENCOUNTER — OFFICE VISIT (OUTPATIENT)
Dept: PULMONOLOGY | Facility: CLINIC | Age: 68
End: 2018-10-12
Payer: COMMERCIAL

## 2018-10-12 ENCOUNTER — TELEPHONE (OUTPATIENT)
Dept: FAMILY MEDICINE | Facility: CLINIC | Age: 68
End: 2018-10-12

## 2018-10-12 VITALS
HEART RATE: 67 BPM | BODY MASS INDEX: 41.49 KG/M2 | RESPIRATION RATE: 18 BRPM | DIASTOLIC BLOOD PRESSURE: 72 MMHG | HEIGHT: 70 IN | WEIGHT: 289.81 LBS | OXYGEN SATURATION: 98 % | SYSTOLIC BLOOD PRESSURE: 120 MMHG

## 2018-10-12 DIAGNOSIS — R06.00 DYSPNEA AND RESPIRATORY ABNORMALITIES: Chronic | ICD-10-CM

## 2018-10-12 DIAGNOSIS — R06.89 DYSPNEA AND RESPIRATORY ABNORMALITIES: Chronic | ICD-10-CM

## 2018-10-12 DIAGNOSIS — E66.01 MORBID OBESITY WITH BMI OF 40.0-44.9, ADULT: Chronic | ICD-10-CM

## 2018-10-12 DIAGNOSIS — G47.33 OSA ON CPAP: Primary | Chronic | ICD-10-CM

## 2018-10-12 DIAGNOSIS — K59.00 CONSTIPATION, UNSPECIFIED CONSTIPATION TYPE: Primary | ICD-10-CM

## 2018-10-12 PROCEDURE — 3078F DIAST BP <80 MM HG: CPT | Mod: CPTII,S$GLB,, | Performed by: INTERNAL MEDICINE

## 2018-10-12 PROCEDURE — 90471 IMMUNIZATION ADMIN: CPT | Mod: S$GLB,,, | Performed by: INTERNAL MEDICINE

## 2018-10-12 PROCEDURE — 90662 IIV NO PRSV INCREASED AG IM: CPT | Mod: S$GLB,,, | Performed by: INTERNAL MEDICINE

## 2018-10-12 PROCEDURE — 1101F PT FALLS ASSESS-DOCD LE1/YR: CPT | Mod: CPTII,S$GLB,, | Performed by: INTERNAL MEDICINE

## 2018-10-12 PROCEDURE — 99999 PR PBB SHADOW E&M-EST. PATIENT-LVL III: CPT | Mod: PBBFAC,,, | Performed by: INTERNAL MEDICINE

## 2018-10-12 PROCEDURE — 3074F SYST BP LT 130 MM HG: CPT | Mod: CPTII,S$GLB,, | Performed by: INTERNAL MEDICINE

## 2018-10-12 PROCEDURE — 99215 OFFICE O/P EST HI 40 MIN: CPT | Mod: 25,S$GLB,, | Performed by: INTERNAL MEDICINE

## 2018-10-12 NOTE — TELEPHONE ENCOUNTER
Patient would like to get the colonoscopy done due to colon issues such as constipation.  You did tell them the risk at his last visit but he is ready to have it done

## 2018-10-12 NOTE — PROGRESS NOTES
Subjective:      Patient ID: Alfredo Brennan is a 68 y.o. male.  Patient Active Problem List   Diagnosis    DM (diabetes mellitus)    Hyperlipidemia associated with type 2 diabetes mellitus    CAD (coronary artery disease)    Left ventricular systolic dysfunction    Hypertension associated with diabetes    Cardiomyopathy due to hypertension, with heart failure    Ischemic dilated cardiomyopathy    Dyspnea and respiratory abnormalities    Morbid obesity with BMI of 40.0-44.9, adult    SERGEY on CPAP    CKD stage 3 due to type 2 diabetes mellitus    History of colon polyps    Acquired hypothyroidism     Problem list has been reviewed.    Chief Complaint: Sleep Apnea    HPI: He is here for follow up for SERGEY and CPAP complaince assessment. he  is on CPAP  of  9.5 CMWP . Patient denies snoring, headaches, excessive daytime sleepiness.  He definitely thinks PAP is beneficial to his health and he wants to continue with PAP therapy.      Compliance Summary  7/14/2018 - 10/11/2018 (90 days)  Days with Device Usage 84 days  Days without Device Usage 6 days  Percent Days with Device Usage 93.3%  Cumulative Usage 27 days 11 hrs. 52 mins. 16 secs.  Maximum Usage (1 Day) 12 hrs. 13 mins. 23 secs.  Average Usage (All Days) 7 hrs. 19 mins. 54 secs.  Average Usage (Days Used) 7 hrs. 51 mins. 20 secs.  Minimum Usage (1 Day) 20 mins. 22 secs.  Percent of Days with Usage >= 4 Hours 88.9%  Percent of Days with Usage < 4 Hours 11.1%  Date Range  Total Blower Time 27 days 11 hrs. 52 mins. 16 secs.  CPAP Summary ([facility])  Average Time in Large Leak Per Day 3 mins. 29 secs.  Average AHI 1.3  CPAP 9.5 cmH2O    Perkins Sleepiness Scale   EPWORTH SLEEPINESS SCALE 10/12/2018 9/25/2017 9/12/2016 9/10/2015 7/2/2015   Sitting and reading 1 0 1 1 1   Watching TV 1 0 1 0 1   Sitting, inactive in a public place (e.g. a theatre or a meeting) 1 0 0 0 0   As a passenger in a car for an hour without a break 0 2 0 0 0   Lying down  to rest in the afternoon when circumstances permit 3 3 0 1 1   Sitting and talking to someone 0 0 0 0 0   Sitting quietly after a lunch without alcohol 0 0 0 0 0   In a car, while stopped for a few minutes in traffic 0 0 0 0 0   Total score 6 5 2 2 3               Patients reports NYHA II dyspnea    Progressive dyspnea wit hexertion.     Recent cardiac ablation.     The patient does not have currently have symptoms / an exacerbation.     A full  review of systems, past , family  and social histories was performed except as mentioned in the note above, these are non contributory to the main issues discussed today.     Previous Report Reviewed: office notes     The following portions of the patient's history were reviewed and updated as appropriate: He  has a past medical history of Atrial fibrillation, CAD (coronary artery disease), CKD stage 3 due to type 2 diabetes mellitus (1/4/2018), Diabetes mellitus, Diabetes mellitus type II, Elevated PSA, History of colon polyps (1/8/2018), Hyperlipidemia associated with type 2 diabetes mellitus (7/10/2012), Hyperlipidemia LDL goal < 70, Hypertension, Hypertension goal BP (blood pressure) < 130/80, Mass of adrenal gland, Mild acid reflux, and Skin cancer (melanoma).  He  has a past surgical history that includes Tonsillectomy; Cholecystectomy; Coronary artery bypass graft; Eye surgery; Cardiac catheterization (2/2015); Prostate surgery; radiation treatment; Cardioversion; and Skin cancer excision.  His family history includes Coronary artery disease in his father; Diabetes in his father; Heart disease in his father and paternal aunt; Hypertension in his mother; Stroke in his maternal grandmother and mother.  He  reports that  has never smoked. he has never used smokeless tobacco. He reports that he drinks alcohol. He reports that he does not use drugs.  He has a current medication list which includes the following prescription(s): albuterol, amiodarone, atorvastatin,  benzonatate, breeze 2 test strips, calcitriol, carvedilol, docusate sodium, famotidine, fluzone high-dose 2017-18 (pf), furosemide, guaifenesin, insulin glargine, lancets/blood glucose strips, levothyroxine, metolazone, nitroglycerin, pen needle, diabetic, ramipril, rivaroxaban, sildenafil, spironolactone, sucralfate, amoxicillin, apixaban, polyethylene glycol, and promethazine.  He is allergic to codeine and fish oil..    Review of Systems   Constitutional: Negative for fever and chills.   HENT: Positive for rhinorrhea and congestion. Negative for nosebleeds and sore throat.    Eyes: Negative for itching.   Respiratory: Positive for cough and dyspnea on extertion. Negative for chest tightness.    Cardiovascular: Negative for chest pain and leg swelling.   Genitourinary: Negative for difficulty urinating and hematuria.   Endocrine: Negative for polydipsia, cold intolerance and heat intolerance.    Musculoskeletal: Positive for back pain. Negative for arthralgias.   Skin: Negative for rash.   Gastrointestinal: Negative for nausea, vomiting, abdominal pain, abdominal distention and acid reflux.   Neurological: Negative for dizziness, syncope, light-headedness and headaches.   Hematological: Excessive bruising. Does not bruise/bleed easily.   Psychiatric/Behavioral: The patient is nervous/anxious.       Objective:     Dyspnea and respiratory abnormalities  Pateint has not had a pulmonary evaluation.    REQUESTED EVALUATION:  [x]        Complete PFT with bronchodilator.  [x]        Stress test, pulmonary.  [x]        PULM - Arterial Blood Gases  [x]        Chest X Ray  []        OC  []        Sedimentation rate  []        C-reactive protein  []        Anti-neutrophilic cytoplasmic antibody  [x]        CT scan of chest.      PLAN:  [x]      Discussed diagnosis, its evaluation, treatment and usual course. All questions answered.          Re evaluate in one month with results.      Morbid obesity with BMI of 40.0-44.9,  "adult  General weight loss/lifestyle modification strategies discussed (elicit support from others; identify saboteurs; non-food rewards, etc).  Diet interventions: low calorie (1000 kCal/d) deficit diet.    SERGEY on CPAP  Continue CPAP of  9.5 CMWP. (DME - [facility])     Discussed therapeutic goals for positive airway pressure therapy(CPAP or BiPAP): Ideal is usage 100% of nights for 6 - 8 hours per night. Minimum usage is 70% of night for at least 4 hours per night used. Pateint expressed understanding. All Questions answered.    CPAP supplies renewed.       Vitals:    10/12/18 1622   BP: 120/72   Pulse: 67   Resp: 18   SpO2: 98%   Weight: 131.4 kg (289 lb 12.7 oz)   Height: 5' 10" (1.778 m)     Body mass index is 41.58 kg/m².     Physical Exam   Constitutional: He is oriented to person, place, and time. He appears well-developed.   Morbidly Obese   HENT:   Head: Normocephalic and atraumatic.   Eyes: EOM are normal. Pupils are equal, round, and reactive to light.   Neck: Normal range of motion. Neck supple.   Cardiovascular: Normal rate. An irregularly irregular rhythm present.   Pulmonary/Chest: Effort normal and breath sounds normal.   Abdominal: Soft. Bowel sounds are normal.   Musculoskeletal: Normal range of motion.   Neurological: He is alert and oriented to person, place, and time.   Skin: Skin is warm and dry.   Psychiatric: He has a normal mood and affect. His behavior is normal.       Personal Diagnostic Review  CPAP complaince download.     Assessment / plan :       Discussed diagnosis, its evaluation, treatment and usual course. All questions answered.    Problem List Items Addressed This Visit        Endocrine    Morbid obesity with BMI of 40.0-44.9, adult (Chronic)    Overview     He has morbid obesity and he is not dieting.  This was discussed today.  He is not exercising.             Current Assessment & Plan     General weight loss/lifestyle modification strategies discussed (elicit support from " others; identify saboteurs; non-food rewards, etc).  Diet interventions: low calorie (1000 kCal/d) deficit diet.            Other    Dyspnea and respiratory abnormalities    Current Assessment & Plan     Pateint has not had a pulmonary evaluation.    REQUESTED EVALUATION:  [x]        Complete PFT with bronchodilator.  [x]        Stress test, pulmonary.  [x]        PULM - Arterial Blood Gases  [x]        Chest X Ray  []        OC  []        Sedimentation rate  []        C-reactive protein  []        Anti-neutrophilic cytoplasmic antibody  [x]        CT scan of chest.      PLAN:  [x]      Discussed diagnosis, its evaluation, treatment and usual course. All questions answered.          Re evaluate in one month with results.           Relevant Orders    Complete PFT with bronchodilator    PULM - Arterial Blood Gases--in addition to PFT only    Pulmonary stress test    CT Chest With Contrast    Basic metabolic panel    SERGEY on CPAP - Primary    Overview     He has SERGEY and he is on a cpap nightly.           Current Assessment & Plan     Continue CPAP of  9.5 CMWP. (DME - [facility])     Discussed therapeutic goals for positive airway pressure therapy(CPAP or BiPAP): Ideal is usage 100% of nights for 6 - 8 hours per night. Minimum usage is 70% of night for at least 4 hours per night used. Pateint expressed understanding. All Questions answered.    CPAP supplies renewed.                  TIME SPENT WITH PATIENT: Time spent: 40 minutes in face to face  discussion concerning diagnosis, prognosis, review of lab and test results, benefits of treatment as well as management of disease, counseling of patient and coordination of care between various health  care providers . Greater than half the time spent was used for coordination of care and counseling of patient.     Follow-up in about 1 month (around 11/12/2018) for SERGEY, Obesity, Morbid Obesity.

## 2018-10-12 NOTE — PATIENT INSTRUCTIONS
Lung Anatomy  Your lungs take air in to give your body oxygen, which the body needs to work. Your lungs, like all the tissues in your body, are made up of billions of tiny specialized cells. Old lung cells die and are replaced by new, identical lung cells. This natural process helps ensure healthy lungs.    Date Last Reviewed: 11/1/2016  © 2640-6709 [employer]. [address] [place] [address], [place] [address]. All rights reserved. This information is not intended as a substitute for professional medical care. Always follow your healthcare professional's instructions.

## 2018-10-12 NOTE — TELEPHONE ENCOUNTER
----- Message from Kaitlin Martinez LPN sent at 10/12/2018  4:16 PM CDT -----  Contact: spouse   Please inform pt/spouse... See OV with Dr Colon  7/2018  ----- Message -----  From: Ashely Lopez  Sent: 10/12/2018  12:36 PM  To: [facility] End[facility] Schedulers    Requesting a call back to schedule colonscopy. Please call back at [phone].      Thanks,  Ashely Lopez

## 2018-10-12 NOTE — ASSESSMENT & PLAN NOTE
Pateint has not had a pulmonary evaluation.    REQUESTED EVALUATION:  [x]        Complete PFT with bronchodilator.  [x]        Stress test, pulmonary.  [x]        PULM - Arterial Blood Gases  [x]        Chest X Ray  []        OC  []        Sedimentation rate  []        C-reactive protein  []        Anti-neutrophilic cytoplasmic antibody  [x]        CT scan of chest.      PLAN:  [x]      Discussed diagnosis, its evaluation, treatment and usual course. All questions answered.          Re evaluate in one month with results.

## 2018-10-15 RX ORDER — SODIUM, POTASSIUM,MAG SULFATES 17.5-3.13G
SOLUTION, RECONSTITUTED, ORAL ORAL
Qty: 354 ML | Refills: 0 | Status: SHIPPED | OUTPATIENT
Start: 2018-10-15 | End: 2018-12-12

## 2018-10-15 RX ORDER — PROMETHAZINE HYDROCHLORIDE 25 MG/1
25 TABLET ORAL EVERY 6 HOURS PRN
Qty: 6 TABLET | Refills: 0 | Status: SHIPPED | OUTPATIENT
Start: 2018-10-15 | End: 2018-12-12

## 2018-10-15 NOTE — TELEPHONE ENCOUNTER
I have ordered the colonoscopy.  He iwll need cardiology clearance.   Let him know that I will schedule this in [place].      Also, ask him if he can get his last eye exam sent to me or get one if he has not had one in the last year.     I have signed for the following orders AND/OR meds.  Please call the patient and ask the patient to schedule the testing AND/OR inform about any medications that were sent.      Orders Placed This Encounter   Procedures    Case request GI: COLONOSCOPY-obtain cardiology clearance     Order Specific Question:   Pre-op Diagnosis     Answer:   Constipation [300555]     Order Specific Question:   CPT Code:     Answer:   [place] COLONOSCOPY,DIAGNOSTIC [[id number]]     Order Specific Question:   Case Referring Provider     Answer:   ARTI MATT [[id number]]       Medications Ordered This Encounter   Medications    promethazine (PHENERGAN) 25 MG tablet     Sig: Take 1 tablet (25 mg total) by mouth every 6 (six) hours as needed for Nausea.     Dispense:  6 tablet     Refill:  0    sodium,potassium,mag sulfates (SUPREP BOWEL PREP KIT) 17.5-3.13-1.6 gram SolR     Sig: Take as instructed on prep sheet     Dispense:  354 mL     Refill:  0

## 2018-10-16 ENCOUNTER — TELEPHONE (OUTPATIENT)
Dept: FAMILY MEDICINE | Facility: CLINIC | Age: 68
End: 2018-10-16

## 2018-10-16 NOTE — TELEPHONE ENCOUNTER
Spoke with wife and informed her that orders have been sent to endo to schedule.  After reviewing chart called wife back and left a message that patient needs to see his cardiologist for clearance prior to procedure

## 2018-10-16 NOTE — TELEPHONE ENCOUNTER
----- Message from Sole Castano sent at 10/16/2018 10:24 AM CDT -----  Contact: Wife  Wife calling states pt needs a colonoscopy,that she had sent a message over on Friday but have not gotten a response would like a call back today please to setup and get going.....[phone] or [phone]

## 2018-10-17 ENCOUNTER — TELEPHONE (OUTPATIENT)
Dept: GASTROENTEROLOGY | Facility: CLINIC | Age: 68
End: 2018-10-17

## 2018-10-18 NOTE — TELEPHONE ENCOUNTER
Spoke with pt and he stated the cardiology appt has been scheduled. I advised he keep Dr Colon's office update on the cardiology visit. Pt stated understanding.

## 2018-10-26 ENCOUNTER — TELEPHONE (OUTPATIENT)
Dept: ENDOSCOPY | Facility: HOSPITAL | Age: 68
End: 2018-10-26

## 2018-10-26 NOTE — TELEPHONE ENCOUNTER
Endoscopy Scheduling Questionnaire:    Call Type:incoming    1. Have you been admitted overnight to the hospital in the past 3 months? no  2. Do you get CP and SOB while walking up a flight of stairs? no  3. Have you had a stent placed in the past 12 months? no  4. Have you had a stroke or heart attack in the past 6 months? no  5. Have you had any chest pain in the past 3 months? no      If so, have you been evaluated by your PCP or Cardiologist? no  6. Do you take weight loss medications? no  7. Have you been diagnosed with Diverticulitis within the past 3 months? no  8. Are you having any GI symptoms that you feel need to be evaluated prior to your procedure? no  9. Are you on dialysis? no  10. Are you diabetic? yes  11. Do you have any other health issues that you feel might limit your ability to safely have the procedure and/or sedation? no  12. Is the patient over 81 yo? no        If so, has the patient been seen by their PCP or GI in the last 3 months? N/A       -I have reviewed the last colonoscopy for recommendations regarding surveillance and bowel prep.   -I have reviewed the patient's medications and allergies. He is on high risk medications and will require cardiac clearance. A clearance request was sent to Dr Whitlock.  -I have verified the pharmacy information. The prep being used is Suprep. The patient's prep instructions were sent via mail..    Pt scheduled on 12-20-18

## 2018-10-29 ENCOUNTER — TELEPHONE (OUTPATIENT)
Dept: ENDOSCOPY | Facility: HOSPITAL | Age: 68
End: 2018-10-29

## 2018-12-03 ENCOUNTER — LAB VISIT (OUTPATIENT)
Dept: LAB | Facility: HOSPITAL | Age: 68
End: 2018-12-03
Attending: INTERNAL MEDICINE
Payer: COMMERCIAL

## 2018-12-03 DIAGNOSIS — R06.02 SOB (SHORTNESS OF BREATH): Primary | ICD-10-CM

## 2018-12-03 DIAGNOSIS — R06.89 DYSPNEA AND RESPIRATORY ABNORMALITIES: Chronic | ICD-10-CM

## 2018-12-03 DIAGNOSIS — R06.00 DYSPNEA AND RESPIRATORY ABNORMALITIES: Chronic | ICD-10-CM

## 2018-12-03 LAB
ANION GAP SERPL CALC-SCNC: 10 MMOL/L
BUN SERPL-MCNC: 28 MG/DL
CALCIUM SERPL-MCNC: 9.1 MG/DL
CHLORIDE SERPL-SCNC: 99 MMOL/L
CO2 SERPL-SCNC: 30 MMOL/L
CREAT SERPL-MCNC: 2.1 MG/DL
EST. GFR  (AFRICAN AMERICAN): 36 ML/MIN/1.73 M^2
EST. GFR  (NON AFRICAN AMERICAN): 31.2 ML/MIN/1.73 M^2
GLUCOSE SERPL-MCNC: 126 MG/DL
POTASSIUM SERPL-SCNC: 3.4 MMOL/L
SODIUM SERPL-SCNC: 139 MMOL/L

## 2018-12-03 PROCEDURE — 36415 COLL VENOUS BLD VENIPUNCTURE: CPT | Mod: PO

## 2018-12-03 PROCEDURE — 80048 BASIC METABOLIC PNL TOTAL CA: CPT

## 2018-12-04 ENCOUNTER — HOSPITAL ENCOUNTER (OUTPATIENT)
Dept: RADIOLOGY | Facility: HOSPITAL | Age: 68
Discharge: HOME | End: 2018-12-04
Attending: INTERNAL MEDICINE
Payer: COMMERCIAL

## 2018-12-04 ENCOUNTER — OFFICE VISIT (OUTPATIENT)
Dept: PULMONOLOGY | Facility: CLINIC | Age: 68
End: 2018-12-04
Payer: COMMERCIAL

## 2018-12-04 ENCOUNTER — CLINICAL SUPPORT (OUTPATIENT)
Dept: PULMONOLOGY | Facility: CLINIC | Age: 68
End: 2018-12-04
Payer: COMMERCIAL

## 2018-12-04 ENCOUNTER — DOCUMENTATION ONLY (OUTPATIENT)
Dept: ENDOSCOPY | Facility: HOSPITAL | Age: 68
End: 2018-12-04

## 2018-12-04 VITALS
HEIGHT: 70 IN | WEIGHT: 282 LBS | HEART RATE: 68 BPM | OXYGEN SATURATION: 98 % | BODY MASS INDEX: 40.37 KG/M2 | BODY MASS INDEX: 40.4 KG/M2 | SYSTOLIC BLOOD PRESSURE: 152 MMHG | RESPIRATION RATE: 18 BRPM | DIASTOLIC BLOOD PRESSURE: 68 MMHG | HEIGHT: 70 IN | WEIGHT: 282.19 LBS

## 2018-12-04 DIAGNOSIS — E66.01 MORBID OBESITY WITH BMI OF 40.0-44.9, ADULT: Chronic | ICD-10-CM

## 2018-12-04 DIAGNOSIS — J41.1 MUCOPURULENT CHRONIC BRONCHITIS: Primary | ICD-10-CM

## 2018-12-04 DIAGNOSIS — R06.89 DYSPNEA AND RESPIRATORY ABNORMALITIES: Chronic | ICD-10-CM

## 2018-12-04 DIAGNOSIS — G47.33 OSA ON CPAP: Chronic | ICD-10-CM

## 2018-12-04 DIAGNOSIS — R06.00 DYSPNEA AND RESPIRATORY ABNORMALITIES: Chronic | ICD-10-CM

## 2018-12-04 LAB
BRPFT: ABNORMAL
DLCO ADJ PRE: 16.95 ML/(MIN*MMHG) (ref 20.52–34.37)
DLCO SINGLE BREATH LLN: 20.52
DLCO SINGLE BREATH PRE REF: 61.8 %
DLCO SINGLE BREATH REF: 27.45
DLCOC SBVA LLN: 2.69
DLCOC SBVA PRE REF: 103.1 %
DLCOC SBVA REF: 3.84
DLCOC SINGLE BREATH LLN: 20.52
DLCOC SINGLE BREATH PRE REF: 61.8 %
DLCOC SINGLE BREATH REF: 27.45
DLCOVA LLN: 2.69
DLCOVA PRE REF: 103.1 %
DLCOVA PRE: 3.96 ML/(MIN*MMHG*L) (ref 2.69–5)
DLCOVA REF: 3.84
DLVAADJ PRE: 3.96 ML/(MIN*MMHG*L) (ref 2.69–5)
ERV LLN: 1.08
ERV PRE REF: 57.6 %
ERV REF: 1.08
ERVN2 LLN: 1.08
ERVN2 REF: 1.08
FEF 25 75 CHG: -2 %
FEF 25 75 LLN: 1.07
FEF 25 75 POST REF: 58.6 %
FEF 25 75 PRE REF: 59.9 %
FEF 25 75 REF: 2.46
FET100 CHG: -9.5 %
FEV1 CHG: 9.8 %
FEV1 FVC CHG: 0.5 %
FEV1 FVC LLN: 63
FEV1 FVC POST REF: 92.1 %
FEV1 FVC PRE REF: 91.6 %
FEV1 FVC REF: 76
FEV1 LLN: 2.34
FEV1 POST REF: 70.6 %
FEV1 PRE REF: 64.3 %
FEV1 REF: 3.23
FEV6 CHG: 11.8 %
FEV6 LLN: 3.32
FEV6 POST REF: 74.7 %
FEV6 POST: 3.16 L (ref 3.32–5.14)
FEV6 PRE REF: 66.8 %
FEV6 PRE: 2.83 L (ref 3.32–5.14)
FEV6 REF: 4.23
FRCN2 LLN: 2.71
FRCN2 REF: 3.7
FRCPLETH LLN: 2.71
FRCPLETH PREREF: 95.9 %
FRCPLETH REF: 3.7
FVC CHG: 9.3 %
FVC LLN: 3.17
FVC POST REF: 76.3 %
FVC PRE REF: 69.9 %
FVC REF: 4.27
IVC PRE: 2.72 L (ref 3.17–5.37)
IVC SINGLE BREATH LLN: 3.17
IVC SINGLE BREATH PRE REF: 63.8 %
IVC SINGLE BREATH REF: 4.27
MVV LLN: 108
MVV PRE REF: 54.5 %
MVV REF: 127
PEF CHG: -0.7 %
PEF LLN: 6.15
PEF POST REF: 50.1 %
PEF PRE REF: 50.5 %
PEF REF: 8.47
POST FEF 25 75: 1.44 L/S (ref 1.07–3.85)
POST FET 100: 10.78 SEC
POST FEV1 FVC: 69.98 % (ref 62.63–89.35)
POST FEV1: 2.28 L (ref 2.34–4.12)
POST FVC: 3.26 L (ref 3.17–5.37)
POST PEF: 4.25 L/S (ref 6.15–10.8)
PRE DLCO: 16.95 ML/(MIN*MMHG) (ref 20.52–34.37)
PRE ERV: 0.62 L (ref 1.08–1.08)
PRE FEF 25 75: 1.47 L/S (ref 1.07–3.85)
PRE FET 100: 11.91 SEC
PRE FEV1 FVC: 69.62 % (ref 62.63–89.35)
PRE FEV1: 2.08 L (ref 2.34–4.12)
PRE FRC PL: 3.54 L
PRE FVC: 2.98 L (ref 3.17–5.37)
PRE MVV: 69 L/MIN (ref 107.54–145.5)
PRE PEF: 4.28 L/S (ref 6.15–10.8)
PRE RV: 2.87 L (ref 1.95–3.29)
PRE TLC: 5.85 L (ref 5.99–8.29)
RAW LLN: 3.06
RAW PRE REF: 140.9 %
RAW PRE: 4.31 CMH2O*S/L (ref 3.06–3.06)
RAW REF: 3.06
RV LLN: 1.95
RV PRE REF: 109.6 %
RV REF: 2.62
RVN2 LLN: 1.95
RVN2 REF: 2.62
RVN2TLCN2 LLN: 32
RVN2TLCN2 REF: 41
RVTLC LLN: 32
RVTLC PRE REF: 120 %
RVTLC PRE: 49.06 % (ref 31.89–49.85)
RVTLC REF: 41
TLC LLN: 5.99
TLC PRE REF: 82 %
TLC REF: 7.14
TLCN2 LLN: 5.99
TLCN2 REF: 7.14
VA PRE: 4.29 L (ref 6.99–6.99)
VA SINGLE BREATH LLN: 6.99
VA SINGLE BREATH PRE REF: 61.4 %
VA SINGLE BREATH REF: 6.99
VC LLN: 3.17
VC PRE REF: 69.9 %
VC PRE: 2.98 L (ref 3.17–5.37)
VC REF: 4.27
VCMAXN2 LLN: 3.17
VCMAXN2 REF: 4.27
VTGRAWPRE: 4.14 L

## 2018-12-04 PROCEDURE — 94060 EVALUATION OF WHEEZING: CPT | Mod: 59,S$GLB,, | Performed by: INTERNAL MEDICINE

## 2018-12-04 PROCEDURE — 3077F SYST BP >= 140 MM HG: CPT | Mod: CPTII,S$GLB,, | Performed by: INTERNAL MEDICINE

## 2018-12-04 PROCEDURE — 94726 PLETHYSMOGRAPHY LUNG VOLUMES: CPT | Mod: S$GLB,,, | Performed by: INTERNAL MEDICINE

## 2018-12-04 PROCEDURE — 99215 OFFICE O/P EST HI 40 MIN: CPT | Mod: 25,S$GLB,, | Performed by: INTERNAL MEDICINE

## 2018-12-04 PROCEDURE — 3078F DIAST BP <80 MM HG: CPT | Mod: CPTII,S$GLB,, | Performed by: INTERNAL MEDICINE

## 2018-12-04 PROCEDURE — 1101F PT FALLS ASSESS-DOCD LE1/YR: CPT | Mod: CPTII,S$GLB,, | Performed by: INTERNAL MEDICINE

## 2018-12-04 PROCEDURE — 94618 PULMONARY STRESS TESTING: CPT | Mod: S$GLB,,, | Performed by: INTERNAL MEDICINE

## 2018-12-04 PROCEDURE — 94729 DIFFUSING CAPACITY: CPT | Mod: S$GLB,,, | Performed by: INTERNAL MEDICINE

## 2018-12-04 PROCEDURE — 99999 PR PBB SHADOW E&M-EST. PATIENT-LVL III: CPT | Mod: PBBFAC,,, | Performed by: INTERNAL MEDICINE

## 2018-12-04 PROCEDURE — 71250 CT THORAX DX C-: CPT | Mod: TC

## 2018-12-04 NOTE — PROGRESS NOTES
Subjective:      Established patient    Patient ID: Alfredo Brennan is a 68 y.o. male.  Patient Active Problem List   Diagnosis    DM (diabetes mellitus)    Hyperlipidemia associated with type 2 diabetes mellitus    CAD (coronary artery disease)    Left ventricular systolic dysfunction    Hypertension associated with diabetes    Cardiomyopathy due to hypertension, with heart failure    Ischemic dilated cardiomyopathy    Mucopurulent chronic bronchitis    Morbid obesity with BMI of 40.0-44.9, adult    SERGEY on CPAP    CKD stage 3 due to type 2 diabetes mellitus    History of colon polyps    Acquired hypothyroidism       Problem list has been reviewed.    Chief Complaint: Sleep Apnea and Shortness of Breath      HPI    PFT, 6 MWT and CT chest reviewed with patient who expressed and voiced understanding.   All questions were answered to the patients satisfaction.      Patients reports NYHA II dyspnea    The patient does not have currently have symptoms / an exacerbation.         No recent change in breathing.       He  is on CPAP  of  9.5 CMWP. Patient denies snoring, headaches, excessive daytime sleepiness.  He is complaint with his CPAP. He definitely thinks PAP is beneficial to his health and he wants to continue with PAP therapy.    Compliance Summary  9/4/2018 - 12/2/2018 (90 days)  Days with Device Usage 86 days  Days without Device Usage 4 days  Percent Days with Device Usage 95.6%  Cumulative Usage 27 days 12 hrs. 35 mins. 54 secs.  Maximum Usage (1 Day) 12 hrs. 21 mins. 24 secs.  Average Usage (All Days) 7 hrs. 20 mins. 23 secs.  Average Usage (Days Used) 7 hrs. 40 mins. 52 secs.  Minimum Usage (1 Day) 20 mins. 22 secs.  Percent of Days with Usage >= 4 Hours 93.3%  Percent of Days with Usage < 4 Hours 6.7%  Date Range  Total Blower Time 27 days 12 hrs. 36 mins. 29 secs.  CPAP Summary ([facility])  Average Time in Large Leak Per Day 1 mins. 17 secs.  Average AHI 1.4  CPAP 9.5 cmH2O    A full   review of systems, past , family  and social histories was performed except as mentioned in the note above, these are non contributory to the main issues discussed today.       Previous Report Reviewed: lab reports, office notes and radiology reports     The following portions of the patient's history were reviewed and updated as appropriate: He  has a past medical history of Atrial fibrillation, CAD (coronary artery disease), CKD stage 3 due to type 2 diabetes mellitus (1/4/2018), Diabetes mellitus, Diabetes mellitus type II, Elevated PSA, History of colon polyps (1/8/2018), Hyperlipidemia associated with type 2 diabetes mellitus (7/10/2012), Hyperlipidemia LDL goal < 70, Hypertension, Hypertension goal BP (blood pressure) < 130/80, Mass of adrenal gland, Mild acid reflux, and Skin cancer (melanoma).  He  has a past surgical history that includes Tonsillectomy; Cholecystectomy; Coronary artery bypass graft; Eye surgery; Cardiac catheterization (2/2015); Prostate surgery; radiation treatment; Cardioversion; and Skin cancer excision.  His family history includes Coronary artery disease in his father; Diabetes in his father; Heart disease in his father and paternal aunt; Hypertension in his mother; Stroke in his maternal grandmother and mother.  He  reports that  has never smoked. he has never used smokeless tobacco. He reports that he drinks alcohol. He reports that he does not use drugs.  He has a current medication list which includes the following prescription(s): albuterol, amiodarone, atorvastatin, benzonatate, breeze 2 test strips, calcitriol, docusate sodium, famotidine, fluzone high-dose 2017-18 (pf), furosemide, guaifenesin, insulin glargine, lancets/blood glucose strips, levothyroxine, metolazone, nitroglycerin, pen needle, diabetic, polyethylene glycol, ramipril, rivaroxaban, spironolactone, sucralfate, carvedilol, promethazine, sildenafil, and sodium,potassium,mag sulfates.  He is allergic to codeine and  "fish oil..    Review of Systems   Constitutional: Negative for fever and chills.   HENT: Positive for rhinorrhea and congestion. Negative for nosebleeds and sore throat.    Eyes: Negative for itching.   Respiratory: Positive for cough and dyspnea on extertion. Negative for chest tightness.    Cardiovascular: Negative for chest pain and leg swelling.   Genitourinary: Negative for difficulty urinating and hematuria.   Endocrine: Negative for polydipsia, cold intolerance and heat intolerance.    Musculoskeletal: Positive for back pain. Negative for arthralgias.   Skin: Negative for rash.   Gastrointestinal: Negative for nausea, vomiting, abdominal pain, abdominal distention and acid reflux.   Neurological: Negative for dizziness, syncope, light-headedness and headaches.   Hematological: Excessive bruising. Does not bruise/bleed easily.   Psychiatric/Behavioral: The patient is nervous/anxious.         Objective:     BP (!) 152/68   Pulse 68   Resp 18   Ht 5' 10" (1.778 m)   Wt 127.9 kg (282 lb)   SpO2 98%   BMI 40.46 kg/m²   Body mass index is 40.46 kg/m².     Physical Exam   Constitutional: He is oriented to person, place, and time. He appears well-developed.   Morbidly Obese   HENT:   Head: Normocephalic and atraumatic.   Eyes: EOM are normal. Pupils are equal, round, and reactive to light.   Neck: Normal range of motion. Neck supple.   Cardiovascular: Normal rate. An irregularly irregular rhythm present.   Pulmonary/Chest: Effort normal and breath sounds normal.   Abdominal: Soft. Bowel sounds are normal.   Musculoskeletal: Normal range of motion.   Neurological: He is alert and oriented to person, place, and time.   Skin: Skin is warm and dry.   Psychiatric: He has a normal mood and affect. His behavior is normal.       Personal Diagnostic Review        Pulmonary function tests: FEV1: 2.08  (64.3 % predicted), FVC:  2.98 (69.9 % predicted), FEV1/FVC:  70, T.85 (82.0 % predicted), RV/TLVC: 49 (120 % " predicted), DLCO: 16.95 (61.8 % predicted)  Moderate fixed obstruction. Normal lung volumes. Mildly reduced but corrects for alveolar volume.       Six Minute Walk Test: Six minute walk distance is 304.8 / 465.29 meters (65.51 % predicted) with very heavy dyspnea. Peak VO2 during walking was 13.12 Ml/min/kg [ 3.75 METS]. During exercise, there was no significant desaturation while breathing room air. Both blood pressure and heart rate increased significantly with walking. Normotension was present prior to exercise. This may represent a normal cardiovascular response to exercise. The patient did not report non-pulmonary symptoms during exercise. Significant exercise impairment is likely due to subjective symptoms. The patient did complete the study, walking 360 seconds of the 360 second test. No previous study performed. Based upon age and body mass index, exercise capacity is less than predicted.      CT of chest performed on 12/04/18 with contrast .      1. There is no evidence of an acute pulmonary process. There is persistent scarring in the right middle lobe.  2. A heterogeneous fat containing mass arising from the lateral limb the left adrenal is again visualized. On the current examination it measures 6.2 cm in craniocaudal dimension by 6.8 cm in medial-lateral dimension by 7.8 cm in AP dimension. On the prior examination it measured 6.4 cm in craniocaudal dimension by 6.5 cm in medial-lateral dimension by 7.5 cm in AP dimension.  This is consistent with the patient's history.  3. There is a 16 mm oval shaped hypodensity on the left side of the isthmus.  If additional imaging evaluation is clinically indicated, I recommend consideration an ultrasound examination of the thyroid.  4. There are moderate degenerative changes in the thoracic spine.  5. Surgical changes  All CT scans at this facility use dose modulation, iterative reconstruction, and/or weight base dosing when appropriate to reduce radiation dose  when appropriate to reduce radiation dose to as low as reasonably achievable.      RAYSHAWN Index for COPD Survival Prediction   Select Criteria:   FEV1 % Predicted After Bronchodialator     [] >= 65% (0 points)    [x] 50-64% (1 point)    [] 36-49% (2 points)    [] <= 35% (3 points)   6 Minute Walk Distance     [] >= 350 Meters (0 points)    [x] 250-349 Meters (1 point)    [] 150-249 Meters (2 points)    [] <= 149 Meters (3 points)   MMRC Dyspnea Scale (4 is worst)     [] MMRC 0: Dyspneic on strenuous excercise (0 points)    [x] MMRC 1: Dyspneic on walking a slight hill (0 points)    [] MMRC 2: Dyspneic on walking level ground; must stop occasionally due to breathlessness (1 point)    [] MMRC 3: Must stop for breathlessness after walking 100 yards or after a few minutes (2 points)    [] MMRC 4: Cannot leave house; breathless on dressing/undressing (3 points)   Body Mass Index     [x] > 21 (0 points)    [] <= 21 (1 point)   Results: Total Criteria Point Count:     Approximate 4 Year Survival Interpretation   0-2 Points:  [x] 80%   3-4 Points:  [] 67%   5-6 Points:  [] 57%   7-10 Points:[] 18%         References  1. Franko BR, Mali CG, Kenneth JM, et. al. The body-mass index, airflow obstruction, dyspnea and exercise capacity index in chronic obstructive pulmonary disease. N Engl J Med. 2004 Mar 4;350(10):1005-12.  2. Hemal DA, Noble CK. Evaluation of clinical methods for rating dyspnea. Chest. 1988 Mar;93(3):580-6      Assessment / Plan:       Discussed diagnosis, its evaluation, treatment and usual course. All questions answered.    Problem List Items Addressed This Visit        Pulmonary    Mucopurulent chronic bronchitis - Primary (Chronic)    Current Assessment & Plan     MCB ROS: notes ongoing cough and chest congestion.  New concerns: Persistent bronchitis.   Exam:  rales noted bilateral bases, normal symmetrical chest expansion.   Assessment: MCB  loss of control due to intercurrent illness.   Plan: Continue  ALBUTEROL. Continue MUCINEX FOR 7 DAYS ONLY. Re evaluate in 6 months.             Endocrine    Morbid obesity with BMI of 40.0-44.9, adult (Chronic)    Overview     He has morbid obesity and he is not dieting.  This was discussed today.  He is not exercising.             Current Assessment & Plan     General weight loss/lifestyle modification strategies discussed (elicit support from others; identify saboteurs; non-food rewards, etc).  Diet interventions: low calorie (1000 kCal/d) deficit diet.            Other    SERGEY on CPAP    Overview     He has SERGEY and he is on a cpap nightly.           Current Assessment & Plan     Continue CPAP of  9.5 CMWP. (DME - [facility])     Discussed therapeutic goals for positive airway pressure therapy(CPAP or BiPAP): Ideal is usage 100% of nights for 6 - 8 hours per night. Minimum usage is 70% of night for at least 4 hours per night used. Pateint expressed understanding. All Questions answered.    CPAP supplies renewed.            Relevant Orders    CPAP/BIPAP SUPPLIES            TIME SPENT WITH PATIENT: Time spent: 45 minutes in face to face  discussion concerning diagnosis, prognosis, review of lab and test results, benefits of treatment as well as management of disease, counseling of patient and coordination of care between various health  care providers . Greater than half the time spent was used for coordination of care and counseling of patient.       Follow-up in about 6 months (around 6/4/2019) for Bronchitis, SERGEY, Morbid Obesity.

## 2018-12-04 NOTE — ASSESSMENT & PLAN NOTE
MCB ROS: notes ongoing cough and chest congestion.  New concerns: Persistent bronchitis.   Exam:  rales noted bilateral bases, normal symmetrical chest expansion.   Assessment: MCB  loss of control due to intercurrent illness.   Plan: Continue ALBUTEROL. Continue MUCINEX FOR 7 DAYS ONLY. Re evaluate in 6 months.

## 2018-12-04 NOTE — PATIENT INSTRUCTIONS
Lung Anatomy  Your lungs take air in to give your body oxygen, which the body needs to work. Your lungs, like all the tissues in your body, are made up of billions of tiny specialized cells. Old lung cells die and are replaced by new, identical lung cells. This natural process helps ensure healthy lungs.    Date Last Reviewed: 11/1/2016  © 1319-8642 [employer]. [address] [place] [address], [place] [address]. All rights reserved. This information is not intended as a substitute for professional medical care. Always follow your healthcare professional's instructions.

## 2018-12-04 NOTE — PROCEDURES
"[facility]  Six Minute Walk     SUMMARY     Ordering Provider: Janet   Interpreting Provider: Janet  Performing nurse/tech/RT: Deni Fulton  Diagnosis: Exertional Dyspnea  Height: 5' 10" (177.8 cm)  Weight: 128 kg (282 lb 3 oz)  BMI (Calculated): 40.6   Patient Race:             Phase Oxygen Assessment Supplemental O2 Heart   Rate Blood Pressure Tala Dyspnea Scale Rating   Resting 96 % Room Air 73 bpm 107/56 5-6   Exercise        Minute        1 94 % Room Air 87 bpm     2 92 % Room Air 89 bpm     3 96 % Room Air 86 bpm     4 99 % Room Air 93 bpm     5 99 % Room Air 87 bpm     6  99 % Room Air 95 bpm 143/63 7-8   Recovery        Minute        1 98 % Room Air 88 bpm     2 99 % Room Air 71 bpm     3 99 % Room Air 70 bpm     4 98 % Room Air 68 bpm 152/68 1     Six Minute Walk Summary           Interpretation:  Did the patient stop or pause?: No                                         Total Time Walked (Calculated): 360 seconds  Final Partial Lap Distance (feet): 0 feet  Total Distance Meters (Calculated): 304.8 meters  Predicted Distance Meters (Calculated): 465.29 meters  Percentage of Predicted (Calculated): 65.51  Peak VO2 (Calculated): 13.12  Mets: 3.75  Has The Patient Had a Previous Six Minute Walk Test?: No       Previous 6MWT Results  Has The Patient Had a Previous Six Minute Walk Test?: No            PHYSICIAN INTERPRETATION:      Six minute walk distance is 304.8 / 465.29 meters (65.51 % predicted) with very heavy dyspnea. Peak VO2 during walking was 13.12 Ml/min/kg [ 3.75 METS]. During exercise, there was no significant desaturation while breathing room air. Both blood pressure and heart rate increased significantly with walking. Normotension was present prior to exercise. This may represent a normal cardiovascular response to exercise. The patient did not report non-pulmonary symptoms during exercise. Significant exercise impairment is likely due to subjective symptoms. The " patient did complete the study, walking 360 seconds of the 360 second test. No previous study performed. Based upon age and body mass index, exercise capacity is less than predicted.

## 2018-12-11 ENCOUNTER — TELEPHONE (OUTPATIENT)
Dept: FAMILY MEDICINE | Facility: CLINIC | Age: 68
End: 2018-12-11

## 2018-12-11 NOTE — TELEPHONE ENCOUNTER
----- Message from Frannie Thomson sent at 12/11/2018  1:03 PM CST -----  Contact: Papi Turpin is calling in regards to having pt seen tomorrow by Dr. Colon due to respiratory problems, which is sooner than I have available. Please call Papi back at .      Thanks,   Frannie Thomson

## 2018-12-11 NOTE — TELEPHONE ENCOUNTER
----- Message from Sudhir Nevarez sent at 12/11/2018  1:47 PM CST -----  Contact: pt   Pt returning missed call,pls call back        ..[phone] (home)

## 2018-12-12 ENCOUNTER — OFFICE VISIT (OUTPATIENT)
Dept: FAMILY MEDICINE | Facility: CLINIC | Age: 68
End: 2018-12-12
Payer: COMMERCIAL

## 2018-12-12 VITALS
DIASTOLIC BLOOD PRESSURE: 55 MMHG | TEMPERATURE: 98 F | BODY MASS INDEX: 40.83 KG/M2 | HEART RATE: 52 BPM | HEIGHT: 70 IN | SYSTOLIC BLOOD PRESSURE: 98 MMHG | WEIGHT: 285.19 LBS

## 2018-12-12 DIAGNOSIS — J41.1 CHRONIC BRONCHITIS WITH PRODUCTIVE MUCOPURULENT COUGH: Primary | ICD-10-CM

## 2018-12-12 PROCEDURE — 3078F DIAST BP <80 MM HG: CPT | Mod: CPTII,S$GLB,, | Performed by: NURSE PRACTITIONER

## 2018-12-12 PROCEDURE — 99213 OFFICE O/P EST LOW 20 MIN: CPT | Mod: S$GLB,,, | Performed by: NURSE PRACTITIONER

## 2018-12-12 PROCEDURE — 1101F PT FALLS ASSESS-DOCD LE1/YR: CPT | Mod: CPTII,S$GLB,, | Performed by: NURSE PRACTITIONER

## 2018-12-12 PROCEDURE — 99999 PR PBB SHADOW E&M-EST. PATIENT-LVL III: CPT | Mod: PBBFAC,,, | Performed by: NURSE PRACTITIONER

## 2018-12-12 PROCEDURE — 3074F SYST BP LT 130 MM HG: CPT | Mod: CPTII,S$GLB,, | Performed by: NURSE PRACTITIONER

## 2018-12-12 RX ORDER — MONTELUKAST SODIUM 10 MG/1
10 TABLET ORAL NIGHTLY
Qty: 10 TABLET | Refills: 0 | Status: SHIPPED | OUTPATIENT
Start: 2018-12-12 | End: 2018-12-22

## 2018-12-12 RX ORDER — LEVOCETIRIZINE DIHYDROCHLORIDE 5 MG/1
5 TABLET, FILM COATED ORAL NIGHTLY
Qty: 10 TABLET | Refills: 0 | Status: SHIPPED | OUTPATIENT
Start: 2018-12-12 | End: 2022-07-20

## 2018-12-12 NOTE — PROGRESS NOTES
"Subjective:      Patient ID: Alfredo Brennan is a 68 y.o. male.    Chief Complaint: Chest Congestion and Cough    Cough   This is a new problem. The current episode started 1 to 4 weeks ago. The problem has been unchanged. The cough is productive of sputum. Associated symptoms include postnasal drip, rhinorrhea and wheezing. Pertinent negatives include no chest pain, chills, ear congestion, ear pain, fever, headaches, nasal congestion, rash, sore throat or shortness of breath. Nothing aggravates the symptoms. He has tried prescription cough suppressant and a beta-agonist inhaler ([facility], Muc[facility]x) for the symptoms. The treatment provided mild relief. His past medical history is significant for bronchitis (chronic mucopurulent bronchitis). seen by his pulmonologist yesterday     Review of Systems   Constitutional: Negative for chills, fatigue and fever.   HENT: Positive for postnasal drip and rhinorrhea. Negative for congestion, ear pain, sinus pressure, sinus pain and sore throat.    Respiratory: Positive for cough and wheezing. Negative for shortness of breath.    Cardiovascular: Negative for chest pain, palpitations and leg swelling.   Skin: Negative for rash and wound.   Neurological: Negative for weakness, numbness and headaches.   Psychiatric/Behavioral: The patient is not nervous/anxious.        Objective:     BP (!) 98/55   Pulse (!) 52   Temp 98.1 °F (36.7 °C) (Oral)   Ht 5' 10" (1.778 m)   Wt 129.4 kg (285 lb 3.2 oz)   BMI 40.92 kg/m²     Physical Exam   Constitutional: He is oriented to person, place, and time. He appears well-developed and well-nourished.   HENT:   Head: Normocephalic.   Right Ear: Tympanic membrane normal.   Left Ear: Tympanic membrane normal.   Nose: Rhinorrhea present. No mucosal edema. Right sinus exhibits no maxillary sinus tenderness and no frontal sinus tenderness. Left sinus exhibits no maxillary sinus tenderness and no frontal sinus " tenderness.   Mouth/Throat: Uvula is midline, oropharynx is clear and moist and mucous membranes are normal.   Eyes: Pupils are equal, round, and reactive to light.   Neck: Normal range of motion. Neck supple.   Cardiovascular: Normal rate, regular rhythm and normal heart sounds.   Pulmonary/Chest: Effort normal. No respiratory distress. He has no decreased breath sounds. He has no wheezes. He has rhonchi. He has no rales.   Neurological: He is alert and oriented to person, place, and time.   Skin: Skin is warm and dry. No rash noted.   Psychiatric: He has a normal mood and affect. His behavior is normal. Judgment and thought content normal.   Vitals reviewed.    Assessment:     1. Chronic bronchitis with productive mucopurulent cough        Plan:     Problem List Items Addressed This Visit     None      Visit Diagnoses     Chronic bronchitis with productive mucopurulent cough    -  Primary    Relevant Medications    montelukast (SINGULAIR) 10 mg tablet    levocetirizine (XYZAL) 5 MG tablet      Continue medications as [facility] by [facility]ologist and [facility]  Report to ER if symptoms worsen    Follow-up if symptoms worsen or fail to improve.        Parts of this note was dictated using voice recognition software. Please excuse any grammatical or typographical errors.

## 2018-12-17 ENCOUNTER — TELEPHONE (OUTPATIENT)
Dept: ENDOSCOPY | Facility: HOSPITAL | Age: 68
End: 2018-12-17

## 2018-12-17 NOTE — TELEPHONE ENCOUNTER
Pt rescheduled from 12-27-19 to 2-1-19 due to endo schedule change. New SUPREP instructions sent via MAIL.

## 2019-01-10 ENCOUNTER — PATIENT OUTREACH (OUTPATIENT)
Dept: ADMINISTRATIVE | Facility: HOSPITAL | Age: 69
End: 2019-01-10

## 2019-01-10 NOTE — PROGRESS NOTES
I have attempted to contacted patient with out success to schedule dm eye exam. Left voice mail.

## 2019-01-11 ENCOUNTER — OFFICE VISIT (OUTPATIENT)
Dept: UROLOGY | Facility: CLINIC | Age: 69
End: 2019-01-11
Attending: UROLOGY
Payer: COMMERCIAL

## 2019-01-11 ENCOUNTER — HOSPITAL ENCOUNTER (OUTPATIENT)
Dept: RADIOLOGY | Facility: OTHER | Age: 69
Discharge: HOME | End: 2019-01-11
Attending: UROLOGY
Payer: COMMERCIAL

## 2019-01-11 VITALS
HEIGHT: 70 IN | WEIGHT: 270 LBS | SYSTOLIC BLOOD PRESSURE: 118 MMHG | HEART RATE: 52 BPM | DIASTOLIC BLOOD PRESSURE: 63 MMHG | BODY MASS INDEX: 38.65 KG/M2

## 2019-01-11 DIAGNOSIS — D17.79 MYELOLIPOMA OF ADRENAL GLAND: ICD-10-CM

## 2019-01-11 DIAGNOSIS — D17.79 MYELOLIPOMA OF LEFT ADRENAL GLAND: Primary | ICD-10-CM

## 2019-01-11 DIAGNOSIS — E11.9 TYPE 2 DIABETES MELLITUS WITHOUT COMPLICATION: ICD-10-CM

## 2019-01-11 PROCEDURE — 99213 PR OFFICE/OUTPT VISIT, EST, LEVL III, 20-29 MIN: ICD-10-PCS | Mod: S$GLB,,, | Performed by: UROLOGY

## 2019-01-11 PROCEDURE — 3074F PR MOST RECENT SYSTOLIC BLOOD PRESSURE < 130 MM HG: ICD-10-PCS | Mod: CPTII,S$GLB,, | Performed by: UROLOGY

## 2019-01-11 PROCEDURE — 3078F PR MOST RECENT DIASTOLIC BLOOD PRESSURE < 80 MM HG: ICD-10-PCS | Mod: CPTII,S$GLB,, | Performed by: UROLOGY

## 2019-01-11 PROCEDURE — 1101F PT FALLS ASSESS-DOCD LE1/YR: CPT | Mod: CPTII,S$GLB,, | Performed by: UROLOGY

## 2019-01-11 PROCEDURE — 3078F DIAST BP <80 MM HG: CPT | Mod: CPTII,S$GLB,, | Performed by: UROLOGY

## 2019-01-11 PROCEDURE — 1101F PR PT FALLS ASSESS DOC 0-1 FALLS W/OUT INJ PAST YR: ICD-10-PCS | Mod: CPTII,S$GLB,, | Performed by: UROLOGY

## 2019-01-11 PROCEDURE — 99213 OFFICE O/P EST LOW 20 MIN: CPT | Mod: S$GLB,,, | Performed by: UROLOGY

## 2019-01-11 PROCEDURE — 3074F SYST BP LT 130 MM HG: CPT | Mod: CPTII,S$GLB,, | Performed by: UROLOGY

## 2019-01-11 RX ORDER — AMIODARONE HYDROCHLORIDE 100 MG/1
100 TABLET ORAL
COMMUNITY
Start: 2018-12-18 | End: 2022-07-20

## 2019-01-11 NOTE — PROGRESS NOTES
"Subjective:      Alfredo Brennan is a 68 y.o. male who returns today regarding his     No  complaints.  He is being followed for a left adrenal myelolipoma    He had a CT of the chest last month which completely imaged this and it is unchanged in size    .    The following portions of the patient's history were reviewed and updated as appropriate: allergies, current medications, past family history, past medical history, past social history, past surgical history and problem list.    Review of Systems  Pertinent items are noted in HPI.  A comprehensive multipoint review of systems was negative except as otherwise stated in the HPI.     Objective:   Vitals: /63   Pulse (!) 52   Ht 5' 10" (1.778 m)   Wt 122.5 kg (270 lb)   BMI 38.74 kg/m²     Physical Exam   General: alert and oriented, no acute distress  Respiratory: Symmetric expansion, non-labored breathing  Cardiovascular: normal to inspection  Abdomen: non distended   Skin: normal coloration and turgor, no rashes, no suspicious skin lesions noted  Neuro: no gross deficits  Psych: normal judgment and insight, normal mood/affect and non-anxious    Physical Exam    Lab Review   Urinalysis demonstrates no specimen  Lab Results   Component Value Date    WBC 7.33 01/27/2015    HGB 12.6 (L) 01/27/2015    HCT 37.0 (L) 01/27/2015    MCV 88 01/27/2015     01/27/2015     Lab Results   Component Value Date    CREATININE 2.2 (H) 12/04/2018    CREATININE 2.0 03/06/2017    BUN 28 (H) 12/03/2018    BUN 33 03/06/2017       Imaging    2. A heterogeneous fat containing mass arising from the lateral limb the left adrenal is again visualized. On the current examination it measures 6.2 cm in craniocaudal dimension by 6.8 cm in medial-lateral dimension by 7.8 cm in AP dimension. On the prior examination it measured 6.4 cm in craniocaudal dimension by 6.5 cm in medial-lateral dimension by 7.5 cm in AP dimension.  This is consistent with the patient's " history.    Assessment and Plan:   Myelolipoma of left adrenal gland  -     CT Renal Stone Study ABD Pelvis WO; Future; Expected date: 01/11/2020      Return to clinic 1 year with CT without contrast to monitor size  No indication for intervention unless this becomes symptomatic

## 2019-01-15 ENCOUNTER — IMMUNIZATION (OUTPATIENT)
Dept: PHARMACY | Facility: CLINIC | Age: 69
End: 2019-01-15
Payer: MEDICARE

## 2019-01-16 DIAGNOSIS — R05.3 CHRONIC COUGH: ICD-10-CM

## 2019-01-16 DIAGNOSIS — E11.59 HYPERTENSION ASSOCIATED WITH DIABETES: ICD-10-CM

## 2019-01-16 DIAGNOSIS — I15.2 HYPERTENSION ASSOCIATED WITH DIABETES: ICD-10-CM

## 2019-01-16 RX ORDER — CARVEDILOL 3.12 MG/1
TABLET ORAL
Qty: 90 TABLET | Refills: 3 | Status: SHIPPED | OUTPATIENT
Start: 2019-01-16 | End: 2019-01-28 | Stop reason: SDUPTHER

## 2019-01-16 RX ORDER — BENZONATATE 100 MG/1
CAPSULE ORAL
Qty: 120 CAPSULE | Refills: 6 | Status: SHIPPED | OUTPATIENT
Start: 2019-01-16 | End: 2019-04-03 | Stop reason: SDUPTHER

## 2019-01-16 RX ORDER — FUROSEMIDE 40 MG/1
TABLET ORAL
Qty: 90 TABLET | Refills: 3 | Status: SHIPPED | OUTPATIENT
Start: 2019-01-16 | End: 2019-01-28 | Stop reason: SDUPTHER

## 2019-01-28 ENCOUNTER — TELEPHONE (OUTPATIENT)
Dept: GASTROENTEROLOGY | Facility: CLINIC | Age: 69
End: 2019-01-28

## 2019-01-28 ENCOUNTER — TELEPHONE (OUTPATIENT)
Dept: ORTHOPEDICS | Facility: CLINIC | Age: 69
End: 2019-01-28

## 2019-01-28 DIAGNOSIS — N18.30 CONTROLLED TYPE 2 DIABETES MELLITUS WITH STAGE 3 CHRONIC KIDNEY DISEASE, WITH LONG-TERM CURRENT USE OF INSULIN: ICD-10-CM

## 2019-01-28 DIAGNOSIS — E11.22 CKD STAGE 3 DUE TO TYPE 2 DIABETES MELLITUS: ICD-10-CM

## 2019-01-28 DIAGNOSIS — E11.22 CONTROLLED TYPE 2 DIABETES MELLITUS WITH STAGE 3 CHRONIC KIDNEY DISEASE, WITH LONG-TERM CURRENT USE OF INSULIN: ICD-10-CM

## 2019-01-28 DIAGNOSIS — N18.30 CKD STAGE 3 DUE TO TYPE 2 DIABETES MELLITUS: ICD-10-CM

## 2019-01-28 DIAGNOSIS — E11.59 HYPERTENSION ASSOCIATED WITH DIABETES: ICD-10-CM

## 2019-01-28 DIAGNOSIS — J20.9 BRONCHITIS WITH BRONCHOSPASM: ICD-10-CM

## 2019-01-28 DIAGNOSIS — Z79.4 CONTROLLED TYPE 2 DIABETES MELLITUS WITH STAGE 3 CHRONIC KIDNEY DISEASE, WITH LONG-TERM CURRENT USE OF INSULIN: ICD-10-CM

## 2019-01-28 DIAGNOSIS — I15.2 HYPERTENSION ASSOCIATED WITH DIABETES: ICD-10-CM

## 2019-01-28 DIAGNOSIS — E11.69 HYPERLIPIDEMIA ASSOCIATED WITH TYPE 2 DIABETES MELLITUS: ICD-10-CM

## 2019-01-28 DIAGNOSIS — E78.5 HYPERLIPIDEMIA ASSOCIATED WITH TYPE 2 DIABETES MELLITUS: ICD-10-CM

## 2019-01-28 RX ORDER — ALBUTEROL SULFATE 90 UG/1
2 AEROSOL, METERED RESPIRATORY (INHALATION) EVERY 6 HOURS PRN
Qty: 18 G | Refills: 1 | Status: SHIPPED | OUTPATIENT
Start: 2019-01-28

## 2019-01-28 RX ORDER — LEVOTHYROXINE SODIUM 88 UG/1
88 TABLET ORAL DAILY
Qty: 90 TABLET | Refills: 1 | Status: SHIPPED | OUTPATIENT
Start: 2019-01-28 | End: 2022-07-20

## 2019-01-28 RX ORDER — FUROSEMIDE 40 MG/1
TABLET ORAL
Qty: 90 TABLET | Refills: 1 | Status: SHIPPED | OUTPATIENT
Start: 2019-01-28 | End: 2021-01-27

## 2019-01-28 RX ORDER — ATORVASTATIN CALCIUM 80 MG/1
80 TABLET, FILM COATED ORAL DAILY
Qty: 90 TABLET | Refills: 1 | Status: SHIPPED | OUTPATIENT
Start: 2019-01-28 | End: 2021-01-27 | Stop reason: SDUPTHER

## 2019-01-28 RX ORDER — CARVEDILOL 3.12 MG/1
TABLET ORAL
Qty: 90 TABLET | Refills: 1 | Status: SHIPPED | OUTPATIENT
Start: 2019-01-28 | End: 2021-01-27

## 2019-01-28 RX ORDER — RAMIPRIL 1.25 MG/1
1.25 CAPSULE ORAL DAILY
Qty: 90 CAPSULE | Refills: 1 | Status: SHIPPED | OUTPATIENT
Start: 2019-01-28 | End: 2019-04-03

## 2019-01-28 RX ORDER — CALCITRIOL 0.25 UG/1
CAPSULE ORAL
Qty: 108 CAPSULE | Refills: 1 | Status: SHIPPED | OUTPATIENT
Start: 2019-01-28 | End: 2022-07-20

## 2019-01-28 RX ORDER — METOLAZONE 5 MG/1
5 TABLET ORAL DAILY PRN
Qty: 90 TABLET | Refills: 1 | Status: SHIPPED | OUTPATIENT
Start: 2019-01-28

## 2019-01-28 RX ORDER — INSULIN GLARGINE 100 [IU]/ML
INJECTION, SOLUTION SUBCUTANEOUS
Qty: 45 ML | Refills: 6 | Status: SHIPPED | OUTPATIENT
Start: 2019-01-28 | End: 2021-01-27

## 2019-01-28 RX ORDER — SPIRONOLACTONE 25 MG/1
12.5 TABLET ORAL DAILY
Qty: 45 TABLET | Refills: 1 | Status: SHIPPED | OUTPATIENT
Start: 2019-01-28 | End: 2019-08-12 | Stop reason: SDUPTHER

## 2019-01-28 NOTE — TELEPHONE ENCOUNTER
Can you please print out all of the pts medications on paper scripts. Pt wants to be able to pick them up and take them to the pharmacy.

## 2019-01-28 NOTE — TELEPHONE ENCOUNTER
Returned patients calll and patient wife stated he was never told to discontinue medication. A cardiac clerance was sent to Dr. Whitlock office 1/28/19 at [phone]. Waiting for an okay.

## 2019-01-28 NOTE — TELEPHONE ENCOUNTER
----- Message from Betty Kaiser sent at 1/28/2019 11:56 AM CST -----  Contact: [phone]  Would like to consult with the nurse, patients needs some [facility], patients status  he is running low on his medication and needs to get them refilled, please call back [phone]

## 2019-01-29 ENCOUNTER — TELEPHONE (OUTPATIENT)
Dept: GASTROENTEROLOGY | Facility: CLINIC | Age: 69
End: 2019-01-29

## 2019-01-29 ENCOUNTER — TELEPHONE (OUTPATIENT)
Dept: FAMILY MEDICINE | Facility: CLINIC | Age: 69
End: 2019-01-29

## 2019-01-29 NOTE — TELEPHONE ENCOUNTER
----- Message from Eyad Swann sent at 1/29/2019  9:44 AM CST -----  Contact: Marva-Pt's wife  She is calling in regards to picking up paper prescriptions for the pt and would like to know what time it would be ready,please advise [phone]

## 2019-01-29 NOTE — TELEPHONE ENCOUNTER
Called and informed patient  That Cardiologist okayed patient to stop Xarelto 2 days prior to procedure. Patient verbalized understanding.

## 2019-01-31 ENCOUNTER — TELEPHONE (OUTPATIENT)
Dept: ENDOSCOPY | Facility: HOSPITAL | Age: 69
End: 2019-01-31

## 2019-02-01 ENCOUNTER — ANESTHESIA (OUTPATIENT)
Dept: ENDOSCOPY | Facility: HOSPITAL | Age: 69
End: 2019-02-01
Payer: COMMERCIAL

## 2019-02-01 ENCOUNTER — HOSPITAL ENCOUNTER (OUTPATIENT)
Facility: HOSPITAL | Age: 69
Discharge: HOME | End: 2019-02-01
Attending: INTERNAL MEDICINE | Admitting: INTERNAL MEDICINE
Payer: COMMERCIAL

## 2019-02-01 ENCOUNTER — ANESTHESIA EVENT (OUTPATIENT)
Dept: ENDOSCOPY | Facility: HOSPITAL | Age: 69
End: 2019-02-01
Payer: COMMERCIAL

## 2019-02-01 VITALS
HEIGHT: 70 IN | RESPIRATION RATE: 14 BRPM | SYSTOLIC BLOOD PRESSURE: 113 MMHG | OXYGEN SATURATION: 99 % | BODY MASS INDEX: 40.23 KG/M2 | HEART RATE: 66 BPM | DIASTOLIC BLOOD PRESSURE: 57 MMHG | TEMPERATURE: 98 F | WEIGHT: 281 LBS

## 2019-02-01 DIAGNOSIS — K57.30 DIVERTICULOSIS OF LARGE INTESTINE WITHOUT HEMORRHAGE: ICD-10-CM

## 2019-02-01 DIAGNOSIS — R19.4 CHANGE IN BOWEL HABITS: ICD-10-CM

## 2019-02-01 DIAGNOSIS — D12.6 ADENOMATOUS POLYP OF COLON, UNSPECIFIED PART OF COLON: Primary | ICD-10-CM

## 2019-02-01 LAB — POCT GLUCOSE: 78 MG/DL (ref 70–110)

## 2019-02-01 PROCEDURE — 25000003 PHARM REV CODE 250: Performed by: INTERNAL MEDICINE

## 2019-02-01 PROCEDURE — 45385 COLONOSCOPY W/LESION REMOVAL: CPT | Mod: ,,, | Performed by: INTERNAL MEDICINE

## 2019-02-01 PROCEDURE — 88305 TISSUE EXAM BY PATHOLOGIST: CPT | Performed by: PATHOLOGY

## 2019-02-01 PROCEDURE — 37000009 HC ANESTHESIA EA ADD 15 MINS: Performed by: INTERNAL MEDICINE

## 2019-02-01 PROCEDURE — 25000003 PHARM REV CODE 250: Performed by: NURSE ANESTHETIST, CERTIFIED REGISTERED

## 2019-02-01 PROCEDURE — 27200997: Performed by: INTERNAL MEDICINE

## 2019-02-01 PROCEDURE — 27201089 HC SNARE, DISP (ANY): Performed by: INTERNAL MEDICINE

## 2019-02-01 PROCEDURE — 45385 COLONOSCOPY W/LESION REMOVAL: CPT | Performed by: INTERNAL MEDICINE

## 2019-02-01 PROCEDURE — 45385 PR COLONOSCOPY,REMV LESN,SNARE: ICD-10-PCS | Mod: ,,, | Performed by: INTERNAL MEDICINE

## 2019-02-01 PROCEDURE — 88305 TISSUE SPECIMEN TO PATHOLOGY - SURGERY: ICD-10-PCS | Mod: 26,,, | Performed by: PATHOLOGY

## 2019-02-01 PROCEDURE — 63600175 PHARM REV CODE 636 W HCPCS: Performed by: NURSE ANESTHETIST, CERTIFIED REGISTERED

## 2019-02-01 PROCEDURE — 37000008 HC ANESTHESIA 1ST 15 MINUTES: Performed by: INTERNAL MEDICINE

## 2019-02-01 PROCEDURE — 88305 TISSUE EXAM BY PATHOLOGIST: CPT | Mod: 26,,, | Performed by: PATHOLOGY

## 2019-02-01 RX ORDER — SODIUM CHLORIDE, SODIUM LACTATE, POTASSIUM CHLORIDE, CALCIUM CHLORIDE 600; 310; 30; 20 MG/100ML; MG/100ML; MG/100ML; MG/100ML
INJECTION, SOLUTION INTRAVENOUS CONTINUOUS
Status: DISCONTINUED | OUTPATIENT
Start: 2019-02-01 | End: 2019-02-01 | Stop reason: HOSPADM

## 2019-02-01 RX ORDER — SODIUM CHLORIDE 0.9 % (FLUSH) 0.9 %
3 SYRINGE (ML) INJECTION
Status: DISCONTINUED | OUTPATIENT
Start: 2019-02-01 | End: 2019-02-01 | Stop reason: HOSPADM

## 2019-02-01 RX ORDER — MIDAZOLAM HYDROCHLORIDE 1 MG/ML
INJECTION, SOLUTION INTRAMUSCULAR; INTRAVENOUS
Status: DISCONTINUED | OUTPATIENT
Start: 2019-02-01 | End: 2019-02-01

## 2019-02-01 RX ORDER — LIDOCAINE HYDROCHLORIDE 10 MG/ML
INJECTION INFILTRATION; PERINEURAL
Status: DISCONTINUED | OUTPATIENT
Start: 2019-02-01 | End: 2019-02-01

## 2019-02-01 RX ORDER — PROPOFOL 10 MG/ML
VIAL (ML) INTRAVENOUS
Status: DISCONTINUED | OUTPATIENT
Start: 2019-02-01 | End: 2019-02-01

## 2019-02-01 RX ADMIN — SODIUM CHLORIDE, SODIUM LACTATE, POTASSIUM CHLORIDE, AND CALCIUM CHLORIDE: 600; 310; 30; 20 INJECTION, SOLUTION INTRAVENOUS at 01:02

## 2019-02-01 RX ADMIN — PROPOFOL 100 MG: 10 INJECTION, EMULSION INTRAVENOUS at 01:02

## 2019-02-01 RX ADMIN — MIDAZOLAM HYDROCHLORIDE 1 MG: 1 INJECTION, SOLUTION INTRAMUSCULAR; INTRAVENOUS at 01:02

## 2019-02-01 RX ADMIN — PROPOFOL 50 MG: 10 INJECTION, EMULSION INTRAVENOUS at 01:02

## 2019-02-01 RX ADMIN — LIDOCAINE HYDROCHLORIDE 3 ML: 10 INJECTION, SOLUTION INFILTRATION; PERINEURAL at 01:02

## 2019-02-01 RX ADMIN — PROPOFOL 40 MG: 10 INJECTION, EMULSION INTRAVENOUS at 01:02

## 2019-02-01 NOTE — [employer] PATIENT INSTRUCTIONS
Discharge Summary/Instructions after an Endoscopic Procedure  Patient Name: Alfredo Brennan  Patient MRN: [id number]  Patient YOB: 1950  Friday, February 01, 2019 Rashid Engel III, MD  RESTRICTIONS:  During your procedure today, you received medications for sedation.  These   medications may affect your judgment, balance and coordination.  Therefore,   for 24 hours, you have the following restrictions:   - DO NOT drive a car, operate machinery, make legal/financial decisions,   sign important papers or drink alcohol.    ACTIVITY:  Today: no heavy lifting, straining or running due to procedural   sedation/anesthesia.  The following day: return to full activity including work.  DIET:  Eat and drink normally unless instructed otherwise.     TREATMENT FOR COMMON SIDE EFFECTS:  - Mild abdominal pain, nausea, belching, bloating or excessive gas:  rest,   eat lightly and use a heating pad.  - Sore Throat: treat with throat lozenges and/or gargle with warm salt   water.  - Because air was used during the procedure, expelling large amounts of air   from your rectum or belching is normal.  - If a bowel prep was taken, you may not have a bowel movement for 1-3 days.    This is normal.  SYMPTOMS TO WATCH FOR AND REPORT TO YOUR PHYSICIAN:  1. Abdominal pain or bloating, other than gas cramps.  2. Chest pain.  3. Back pain.  4. Signs of infection such as: chills or fever occurring within 24 hours   after the procedure.  5. Rectal bleeding, which would show as bright red, maroon, or black stools.   (A tablespoon of blood from the rectum is not serious, especially if   hemorrhoids are present.)  6. Vomiting.  7. Weakness or dizziness.  GO DIRECTLY TO THE NEAREST EMERGENCY ROOM IF YOU HAVE ANY OF THE FOLLOWING:      Difficulty breathing              Chills and/or fever over 101 F   Persistent vomiting and/or vomiting blood   Severe abdominal pain   Severe chest pain   Black, tarry stools   Bleeding- more than one  tablespoon   Any other symptom or condition that you feel may need urgent attention  Your doctor recommends these additional instructions:  If any biopsies were taken, your doctors clinic will contact you in 1 to 2   weeks with any results.  - Discharge patient to home (via wheelchair).   - High fiber diet.   - Continue present medications.   - Await pathology results.   - Repeat colonoscopy in 3 years for surveillance.   - Return to primary care physician as previously scheduled.   - Discharge patient to home (via wheelchair).   - High fiber diet.   - Continue present medications.   - Await pathology results.   - Repeat colonoscopy in 3 years for surveillance.   - Return to primary care physician as previously scheduled.   - Return to primary care physician as previously scheduled.  For questions, problems or results please call your physician Rashid Engel III, MD at Work:  [phone]  If you have any questions about the above instructions, call the GI   department at [phone] or call the endoscopy unit at [phone]   from 7am until 3 pm.  [facility] [place], EMERGENCY ROOM PHONE NUMBER:   [phone]  IF A COMPLICATION OR EMERGENCY SITUATION ARISES AND YOU ARE UNABLE TO REACH   YOUR PHYSICIAN - GO DIRECTLY TO THE EMERGENCY ROOM.  I have read or have had read to me these discharge instructions for my   procedure and have received a written copy.  I understand these   instructions and will follow-up with my physician if I have any questions.     __________________________________       _____________________________________  Nurse Signature                                          Patient/Designated   Responsible Party Signature  Rashid Engel III, MD  2/1/2019 2:43:58 PM  This report has been verified and signed electronically.  [employer]

## 2019-02-01 NOTE — ANESTHESIA RELEASE NOTE
"Anesthesia Release from PACU Note    Patient: Alfredo Brennan    Procedure(s) Performed: Procedure(s) (LRB):  COLONOSCOPY-obtain cardiology clearance (N/A)    Anesthesia type: MAC    Post pain: Adequate analgesia    Post assessment: no apparent anesthetic complications, tolerated procedure well and no evidence of recall    Last Vitals:   Visit Vitals  BP (!) 91/46 (BP Location: Left arm, Patient Position: Sitting)   Pulse (!) 57   Temp 36.6 °C (97.9 °F) (Tympanic)   Resp 14   Ht 5' 10" (1.778 m)   Wt 127.5 kg (281 lb)   SpO2 99%   BMI 40.32 kg/m²       Post vital signs: stable    Level of consciousness: awake, alert  and oriented    Nausea/Vomiting: no nausea/no vomiting    Complications: none    Airway Patency: patent    Respiratory: unassisted, spontaneous ventilation, room air    Cardiovascular: stable and blood pressure at baseline    Hydration: euvolemic  "

## 2019-02-01 NOTE — TRANSFER OF CARE
"Anesthesia Transfer of Care Note    Patient: Alfredo Brennan    Procedure(s) Performed: Procedure(s) (LRB):  COLONOSCOPY-obtain cardiology clearance (N/A)    Patient location: GI    Anesthesia Type: MAC    Transport from OR: Transported from OR on room air with adequate spontaneous ventilation    Post pain: adequate analgesia    Post assessment: no apparent anesthetic complications and tolerated procedure well    Post vital signs: stable    Level of consciousness: awake, alert and oriented    Nausea/Vomiting: no nausea/vomiting    Complications: none    Transfer of care protocol was followed      Last vitals:   Visit Vitals  BP (!) 91/46 (BP Location: Left arm, Patient Position: Sitting)   Pulse (!) 57   Temp 36.6 °C (97.9 °F) (Tympanic)   Resp 14   Ht 5' 10" (1.778 m)   Wt 127.5 kg (281 lb)   SpO2 99%   BMI 40.32 kg/m²     "

## 2019-02-01 NOTE — DISCHARGE INSTRUCTIONS

## 2019-02-01 NOTE — ANESTHESIA PREPROCEDURE EVALUATION
02/01/2019  Alfredo Brennan is a 68 y.o., male.    Anesthesia Evaluation    I have reviewed the Patient Summary Reports.    I have reviewed the Nursing Notes.   I have reviewed the Medications.     Review of Systems  Anesthesia Hx:  Pt states that he is slow to wake up. Requests low amount of medication for sedation Denies Family Hx of Anesthesia complications.  Personal Hx of Anesthesia complications Slow To Awaken/Delayed Emergence and mild, somewhat sensitive to sedation / narcotics   Social:  Non-Smoker, No Alcohol Use    Hematology/Oncology:  Hematology Normal   Oncology Normal     Cardiovascular:   Hypertension CAD  CABG/stent       o The left ventricle is mildly dilated.     o Ejection Fraction = 35-40%.     o Left ventricular systolic function is moderately reduced.     o There is normal left ventricular wall thickness.     o There is septal akinesis.     o There is anterior wall akinesis.     o There is severe apical wall hypokinesis.     o Thin and bright LV wall segment suggests a myocardial infarction.     o There is no thrombus. CAD s/p CABG 2008, ischemic cardiomyopathy (EF 35-40%), paroxysmal atrial fibrillation s/p ablation 05/29/18 on chronic anticoagulation and amiodarone, hypertension, and hyperlipidemia   Renal/:   Chronic Renal Disease, CRI    Hepatic/GI:   GERD    Musculoskeletal:  Musculoskeletal Normal    Neurological:  Neurology Normal    Endocrine:   Diabetes, using insulin    Dermatological:  Skin Normal    Psych:  Psychiatric Normal           Physical Exam  General:  Morbid Obesity    Airway/Jaw/Neck:  Airway Findings: Mouth Opening: Normal Tongue: Large  General Airway Assessment: Adult  Mallampati: III  Improves to III with phonation.  TM Distance: 4 - 6 cm      Dental:  Dental Findings: In tact        Mental Status:  Mental Status Findings:  Cooperative         Anesthesia  Plan  Type of Anesthesia, risks & benefits discussed:  Anesthesia Type:  MAC  Patient's Preference:   Intra-op Monitoring Plan:   Intra-op Monitoring Plan Comments:   Post Op Pain Control Plan:   Post Op Pain Control Plan Comments:   Induction:   IV  Beta Blocker:         Informed Consent: Patient understands risks and agrees with Anesthesia plan.  Questions answered. Anesthesia consent signed with patient.  ASA Score: 4     Day of Surgery Review of History & Physical: I have interviewed and examined the patient. I have reviewed the patient's H&P dated:  There are no significant changes.      Anesthesia Plan Notes: Pt states that he got cardiac clearance from cardiology in [place] last week. Felida        Ready For Surgery From Anesthesia Perspective.

## 2019-02-01 NOTE — DISCHARGE SUMMARY
[facility] -   Brief Operative Note     SUMMARY     Surgery Date: 2/1/2019     Surgeon(s) and Role:     * Rashid Engel III, MD - Primary    Assisting Surgeon: None    Pre-op Diagnosis:  Constipation, unspecified constipation type [K59.00]    Post-op Diagnosis:  Post-Op Diagnosis Codes:     * Constipation, unspecified constipation type [K59.00]      - Diverticulosis      - Colon Polyps  Procedure(s) (LRB):  COLONOSCOPY-obtain cardiology clearance (N/A)    Anesthesia: Local MAC    Description of the findings of the procedure: Procedures completed. See Procedure note for full details.    Findings/Key Components: Procedures completed. See Procedure note for full details.    Prosthetics/Devices: None    Estimated Blood Loss: * No values recorded between 2/1/2019 12:00 AM and 2/1/2019  2:46 PM *         Specimens:   Specimen (12h ago, onward)    Start     Ordered    02/01/19 1343  Specimen to Pathology - Surgery  Once     Comments:  1.  Descending colon polyp2.  Transverse colon polyp3.  Ascending colon polyp4.  Hepatic flexure colon polyp     Start Status   02/01/19 1343 Collected (02/01/19 1424)       02/01/19 1419          Discharge Note    SUMMARY     Admit Date: 2/1/2019    Discharge Date and Time: 2/1/2019    Hospital Course (synopsis of major diagnoses, care, treatment, and services provided during the course of the hospital stay):  Procedures completed. See Procedure note for full details. Discharge patient when discharge criteria met.    Final Diagnosis: Post-Op Diagnosis Codes:     * Constipation, unspecified constipation type [K59.00]     - Colon polyps     - diverticulosis  Disposition: Discharge patient when discharge criteria met.    Follow Up/Patient Instructions:       Medications:  Reconciled Home Medications:   Current Discharge Medication List      CONTINUE these medications which have NOT CHANGED    Details   albuterol (PROVENTIL/VENTOLIN HFA) 90 mcg/actuation inhaler Inhale 2 puffs  into the lungs every 6 (six) hours as needed for Wheezing.  Qty: 18 g, Refills: 1    Associated Diagnoses: Bronchitis with bronchospasm      amiodarone (PACERONE) 100 MG Tab Take 100 mg by mouth.      atorvastatin (LIPITOR) 80 MG tablet Take 1 tablet (80 mg total) by mouth once daily.  Qty: 90 tablet, Refills: 1    Associated Diagnoses: Hyperlipidemia associated with type 2 diabetes mellitus      !! benzonatate (TESSALON) 100 MG capsule TAKE ONE CAPSULE BY MOUTH EVERY 8 HOURS AS NEEDED FOR COUGH  Qty: 100 capsule, Refills: 3    Comments: Please consider 90 day supplies to promote better adherence  Associated Diagnoses: Chronic cough      !! benzonatate (TESSALON) 100 MG capsule TAKE ONE CAPSULE BY MOUTH EVERY 4 HOURS AS NEEDED  Qty: 120 capsule, Refills: 6    Comments: Please consider 90 day supplies to promote better adherence  Associated Diagnoses: Chronic cough      BREEZE 2 TEST STRIPS Strp USE ONE STRIP TO CHECK GLUCOSE 3 TO 4 TIMES DAILY AS NEEDED  Qty: 100 strip, Refills: 11      calcitRIOL (ROCALTROL) 0.25 MCG Cap Use on daily during the week and two daily on weekends.  Qty: 108 capsule, Refills: 1    Associated Diagnoses: CKD stage 3 due to type 2 diabetes mellitus      carvedilol (COREG) 3.125 MG tablet TAKE ONE-HALF TABLET BY MOUTH IN THE MORNING AND ONE-HALF TABLET IN THE EVENING  Qty: 90 tablet, Refills: 1    Associated Diagnoses: Hypertension associated with diabetes      docusate sodium (STOOL SOFTENER) 100 MG capsule 3 (three) times daily as needed. Every day      famotidine (PEPCID) 20 MG tablet 2 (two) times daily. Every day      furosemide (LASIX) 40 MG tablet TAKE ONE TABLET BY MOUTH IN THE MORNING  Qty: 90 tablet, Refills: 1    Associated Diagnoses: Hypertension associated with diabetes      insulin (LANTUS SOLOSTAR U-100 INSULIN) glargine 100 units/mL (3mL) SubQ pen INJECT 52 UNITS            SUBCUTANEOUSLY ONCE DAILY  Qty: 45 mL, Refills: 6    Associated Diagnoses: Controlled type 2 diabetes  "mellitus with stage 3 chronic kidney disease, with long-term current use of insulin      levothyroxine (SYNTHROID) 88 MCG tablet Take 1 tablet (88 mcg total) by mouth once daily.  Qty: 90 tablet, Refills: 1      metOLazone (ZAROXOLYN) 5 MG tablet Take 1 tablet (5 mg total) by mouth daily as needed.  Qty: 90 tablet, Refills: 1    Associated Diagnoses: Hypertension associated with diabetes      ramipril (ALTACE) 1.25 MG capsule Take 1 capsule (1.25 mg total) by mouth once daily.  Qty: 90 capsule, Refills: 1    Associated Diagnoses: Hypertension associated with diabetes      rivaroxaban (XARELTO) 15 mg Tab Take 15 mg by mouth once daily.       spironolactone (ALDACTONE) 25 MG tablet Take 0.5 tablets (12.5 mg total) by mouth once daily. 1/2 Every day  Qty: 45 tablet, Refills: 1    Associated Diagnoses: Hypertension associated with diabetes      guaiFENesin (MUCINEX) 600 mg 12 hr tablet Take 600 mg by mouth.      LANCETS & BLOOD GLUCOSE STRIPS MISC qid      levocetirizine (XYZAL) 5 MG tablet Take 1 tablet (5 mg total) by mouth every evening. for 10 days  Qty: 10 tablet, Refills: 0    Associated Diagnoses: Chronic bronchitis with productive mucopurulent cough      nitroGLYCERIN (NITROSTAT) 0.4 MG SL tablet Place 0.4 mg under the tongue every 5 (five) minutes as needed for Chest pain.      pen needle, diabetic (PEN NEEDLE) 31 gauge x 5/16" Ndle USE ONE SYRINGE EVERY DAY  Qty: 100 each, Refills: 3    Associated Diagnoses: Controlled type 2 diabetes mellitus with stage 3 chronic kidney disease, with long-term current use of insulin      sildenafil (VIAGRA) 100 MG tablet Take 50 mg by mouth.       !! - Potential duplicate medications found. Please discuss with provider.         Discharge Procedure Orders   Diet general     Activity as tolerated     "

## 2019-02-01 NOTE — ANESTHESIA POSTPROCEDURE EVALUATION
"Anesthesia Post Evaluation    Patient: Alfredo Brennan    Procedure(s) Performed: Procedure(s) (LRB):  COLONOSCOPY-obtain cardiology clearance (N/A)    Final Anesthesia Type: MAC  Patient location during evaluation: GI PACU  Patient participation: Yes- Able to Participate  Level of consciousness: awake and alert and oriented  Post-procedure vital signs: reviewed and stable  Pain management: adequate  Airway patency: patent  PONV status at discharge: No PONV  Anesthetic complications: no      Cardiovascular status: blood pressure returned to baseline  Respiratory status: unassisted, spontaneous ventilation and room air  Hydration status: euvolemic  Follow-up not needed.        Visit Vitals  BP (!) 91/46 (BP Location: Left arm, Patient Position: Sitting)   Pulse (!) 57   Temp 36.6 °C (97.9 °F) (Tympanic)   Resp 14   Ht 5' 10" (1.778 m)   Wt 127.5 kg (281 lb)   SpO2 99%   BMI 40.32 kg/m²       Pain/William Score: William Score: 9 (2/1/2019  2:25 PM)        "

## 2019-02-01 NOTE — H&P
Short Stay Endoscopy History and Physical    PCP - Ángel Colon MD    Procedure - Colonoscopy  ASA - 3  Mallampati - per anesthesia  History of Anesthesia problems - no  Family history Anesthesia problems -  no     HPI:  This is a 68 y.o.male here for evaluation of : Change in bowel hahbits    Reflux - no  Dysphagia - no  Abdominal pain - no  Diarrhea - no  Anemia - no  GI bleeding - no  Nausea and vomiting-no  Early satiety-no  aversion to sight or smell of food-no    ROS:  Constitutional: No fevers, chills, No weight loss  ENT: No allergies  CV: No chest pain  Pulm: No cough, No shortness of breath  Ophtho: No vision changes  GI: see HPI  Derm: No rash  Heme: No lymphadenopathy, No bruising  MSK: No arthritis  : No dysuria, No hematuria  Endo: No hot or cold intolerance  Neuro: No syncope, No seizure  Psych: No anxiety, No depression    Medical History:  Past Medical History:   Diagnosis Date    Atrial fibrillation     CAD (coronary artery disease)     CKD stage 3 due to type 2 diabetes mellitus 1/4/2018    Lab Results Component Value Date  CREATININE 2.04 (H) 01/03/2018  BUN 22 01/03/2018   01/03/2018  K 4.2 01/03/2018   01/03/2018  CO2 30 01/03/2018  BMP Lab Results Component Value Date   01/03/2018  K 4.2 01/03/2018   01/03/2018  CO2 30 01/03/2018  BUN 22 01/03/2018  CREATININE 2.04 (H) 01/03/2018  CALCIUM 8.7 01/03/2018  ANIONGAP 8 01/27/2015  ESTGFRAFRICA 38 (L) 01/03/201    Diabetes mellitus     Diabetes mellitus type II     Elevated PSA     History of colon polyps 1/8/2018    He has had colon polyps in the past and he is screened serially for this.     Hyperlipidemia associated with type 2 diabetes mellitus 7/10/2012    Hyperlipidemia LDL goal < 70     Hypertension     Hypertension goal BP (blood pressure) < 130/80     Mass of adrenal gland     Mild acid reflux     Skin cancer     Skin cancer (melanoma)        Surgical History:  Past Surgical History:    Procedure Laterality Date    CARDIAC CATHETERIZATION  2/2015    CARDIOVERSION      CHOLECYSTECTOMY      CORONARY ARTERY BYPASS GRAFT      EYE SURGERY      mohs      PROSTATE SURGERY      radiation treatment      SKIN CANCER EXCISION      TONSILLECTOMY         Family History:  Family History   Problem Relation Age of Onset    Stroke Mother     Hypertension Mother     Heart disease Father     Coronary artery disease Father     Diabetes Father     Heart disease Paternal Aunt     Stroke Maternal Grandmother        Social History:  Social History     Socioeconomic History    Marital status:      Spouse name: Not on file    Number of children: Not on file    Years of education: Not on file    Highest education level: Not on file   Social Needs    Financial resource strain: Not on file    Food insecurity - worry: Not on file    Food insecurity - inability: Not on file    Transportation needs - medical: Not on file    Transportation needs - non-medical: Not on file   Occupational History    Not on file   Tobacco Use    Smoking status: Never Smoker    Smokeless tobacco: Never Used   Substance and Sexual Activity    Alcohol use: Yes     Comment: very rare    Drug use: No    Sexual activity: Not on file     Comment: once monthly   Other Topics Concern    Not on file   Social History Narrative    Not on file       Allergies:   Review of patient's allergies indicates:   Allergen Reactions    Codeine      Other reaction(s): Hallucinations    Fish oil Rash and Itching       Medications:   No current facility-administered medications on file prior to encounter.      Current Outpatient Medications on File Prior to Encounter   Medication Sig Dispense Refill    benzonatate (TESSALON) 100 MG capsule TAKE ONE CAPSULE BY MOUTH EVERY 8 HOURS AS NEEDED FOR COUGH 100 capsule 3    BREEZE 2 TEST STRIPS Strp USE ONE STRIP TO CHECK GLUCOSE 3 TO 4 TIMES DAILY AS NEEDED 100 strip 11    docusate  "sodium (STOOL SOFTENER) 100 MG capsule 3 (three) times daily as needed. Every day      famotidine (PEPCID) 20 MG tablet 2 (two) times daily. Every day      rivaroxaban (XARELTO) 15 mg Tab Take 15 mg by mouth once daily.       guaiFENesin (MUCINEX) 600 mg 12 hr tablet Take 600 mg by mouth.      LANCETS & BLOOD GLUCOSE STRIPS MISC qid      nitroGLYCERIN (NITROSTAT) 0.4 MG SL tablet Place 0.4 mg under the tongue every 5 (five) minutes as needed for Chest pain.      pen needle, diabetic (PEN NEEDLE) 31 gauge x 5/16" Ndle USE ONE SYRINGE EVERY  each 3    sildenafil (VIAGRA) 100 MG tablet Take 50 mg by mouth.         Objective Findings:    Vital Signs:  Vitals:    02/01/19 1131   BP: 130/71   Pulse: 62   Resp: 15   Temp: 97.3 °F (36.3 °C)           Physical Exam:  General Appearance: Well appearing in no acute distress  Eyes:    No scleral icterus  ENT: Neck supple, Lips, mucosa, and tongue normal; teeth and gums normal  Lungs: CTA bilaterally in anterior and posterior fields, no wheezes, no crackles.  Heart:  Regular rate, S1, S2 normal, no murmurs heard.  Abdomen: Soft, non tender, non distended with normal bowel sounds. No hepatosplenomegaly, ascites, or mass.  Extremities: No clubbing, cyanosis or edema  Skin: No rash    Labs:  Reviewed    Plan:Colonoscopy  I have explained the risks and benefits of endoscopy procedures to the patient including but not limited to bleeding, perforation, infection, and death. The patient wishes to proceed.           "

## 2019-03-11 DIAGNOSIS — E11.59 HYPERTENSION ASSOCIATED WITH DIABETES: ICD-10-CM

## 2019-03-11 DIAGNOSIS — I15.2 HYPERTENSION ASSOCIATED WITH DIABETES: ICD-10-CM

## 2019-03-11 NOTE — TELEPHONE ENCOUNTER
Let him know that I got a request to refill his blood pressure medicine, ALTACE,  but there is now a warning that this medicine could cause cancer in rare cases.  Because of this I am changing folks to other meds.  However, this was probably started by his cardiologist and he would need to speak with him to ask if he recommends this and if so, what does he recommend changing to.  Let me know.

## 2019-03-12 ENCOUNTER — TELEPHONE (OUTPATIENT)
Dept: FAMILY MEDICINE | Facility: CLINIC | Age: 69
End: 2019-03-12

## 2019-03-12 RX ORDER — RAMIPRIL 1.25 MG/1
CAPSULE ORAL
Qty: 90 CAPSULE | Refills: 3 | OUTPATIENT
Start: 2019-03-12

## 2019-03-12 NOTE — TELEPHONE ENCOUNTER
----- Message from Ashely Lopez sent at 3/12/2019 11:27 AM CDT -----  Contact: self  Type:  Patient Returning Call    Who Called:mr carrillo-patient   Who Left Message for Patient:dr aquino office  Does the patient know what this is regarding?:medication refills  Would the patient rather a call back or a response via [facility]? Call  Best Call Back Number:[phone]  Additional Information: n/a

## 2019-04-03 ENCOUNTER — LAB VISIT (OUTPATIENT)
Dept: LAB | Facility: HOSPITAL | Age: 69
End: 2019-04-03
Attending: FAMILY MEDICINE
Payer: COMMERCIAL

## 2019-04-03 ENCOUNTER — OFFICE VISIT (OUTPATIENT)
Dept: FAMILY MEDICINE | Facility: CLINIC | Age: 69
End: 2019-04-03
Payer: COMMERCIAL

## 2019-04-03 VITALS
BODY MASS INDEX: 41.06 KG/M2 | WEIGHT: 286.81 LBS | DIASTOLIC BLOOD PRESSURE: 59 MMHG | SYSTOLIC BLOOD PRESSURE: 109 MMHG | TEMPERATURE: 98 F | HEIGHT: 70 IN | HEART RATE: 62 BPM

## 2019-04-03 DIAGNOSIS — Z79.4 TYPE 2 DIABETES MELLITUS WITHOUT COMPLICATION, WITH LONG-TERM CURRENT USE OF INSULIN: ICD-10-CM

## 2019-04-03 DIAGNOSIS — R30.9 PAINFUL URINATION: ICD-10-CM

## 2019-04-03 DIAGNOSIS — N18.30 CKD STAGE 3 DUE TO TYPE 2 DIABETES MELLITUS: ICD-10-CM

## 2019-04-03 DIAGNOSIS — D17.79 MYELOLIPOMA OF ADRENAL GLAND: ICD-10-CM

## 2019-04-03 DIAGNOSIS — N30.00 ACUTE CYSTITIS WITHOUT HEMATURIA: Primary | ICD-10-CM

## 2019-04-03 DIAGNOSIS — C61 PROSTATE CANCER: ICD-10-CM

## 2019-04-03 DIAGNOSIS — E11.9 TYPE 2 DIABETES MELLITUS WITHOUT COMPLICATION, WITH LONG-TERM CURRENT USE OF INSULIN: ICD-10-CM

## 2019-04-03 DIAGNOSIS — E11.22 CKD STAGE 3 DUE TO TYPE 2 DIABETES MELLITUS: ICD-10-CM

## 2019-04-03 LAB
ANION GAP SERPL CALC-SCNC: 8 MMOL/L (ref 8–16)
BACTERIA #/AREA URNS HPF: ABNORMAL /HPF
BILIRUB UR QL STRIP: ABNORMAL
BUN SERPL-MCNC: 31 MG/DL (ref 8–23)
CALCIUM SERPL-MCNC: 9.4 MG/DL (ref 8.7–10.5)
CHLORIDE SERPL-SCNC: 103 MMOL/L (ref 95–110)
CLARITY UR: CLEAR
CO2 SERPL-SCNC: 28 MMOL/L (ref 23–29)
COLOR UR: ABNORMAL
CREAT SERPL-MCNC: 2.2 MG/DL (ref 0.5–1.4)
EST. GFR  (AFRICAN AMERICAN): 33.8 ML/MIN/1.73 M^2
EST. GFR  (NON AFRICAN AMERICAN): 29.3 ML/MIN/1.73 M^2
GLUCOSE SERPL-MCNC: 137 MG/DL (ref 70–110)
GLUCOSE UR QL STRIP: ABNORMAL
HGB UR QL STRIP: ABNORMAL
KETONES UR QL STRIP: ABNORMAL
LEUKOCYTE ESTERASE UR QL STRIP: ABNORMAL
MICROSCOPIC COMMENT: ABNORMAL
NITRITE UR QL STRIP: ABNORMAL
PH UR STRIP: ABNORMAL [PH] (ref 5–8)
POTASSIUM SERPL-SCNC: 4.1 MMOL/L (ref 3.5–5.1)
PROT UR QL STRIP: ABNORMAL
RBC #/AREA URNS HPF: 50 /HPF (ref 0–4)
SODIUM SERPL-SCNC: 139 MMOL/L (ref 136–145)
SP GR UR STRIP: ABNORMAL (ref 1–1.03)
SQUAMOUS #/AREA URNS HPF: 3 /HPF
URN SPEC COLLECT METH UR: ABNORMAL
WBC #/AREA URNS HPF: >100 /HPF (ref 0–5)
WBC CLUMPS URNS QL MICRO: ABNORMAL

## 2019-04-03 PROCEDURE — 81000 URINALYSIS NONAUTO W/SCOPE: CPT | Mod: PO

## 2019-04-03 PROCEDURE — 36415 COLL VENOUS BLD VENIPUNCTURE: CPT | Mod: PO

## 2019-04-03 PROCEDURE — 87088 URINE BACTERIA CULTURE: CPT

## 2019-04-03 PROCEDURE — 87086 URINE CULTURE/COLONY COUNT: CPT

## 2019-04-03 PROCEDURE — 1101F PT FALLS ASSESS-DOCD LE1/YR: CPT | Mod: CPTII,S$GLB,, | Performed by: FAMILY MEDICINE

## 2019-04-03 PROCEDURE — 83036 HEMOGLOBIN GLYCOSYLATED A1C: CPT

## 2019-04-03 PROCEDURE — 3044F HG A1C LEVEL LT 7.0%: CPT | Mod: CPTII,S$GLB,, | Performed by: FAMILY MEDICINE

## 2019-04-03 PROCEDURE — 87186 SC STD MICRODIL/AGAR DIL: CPT

## 2019-04-03 PROCEDURE — 1101F PR PT FALLS ASSESS DOC 0-1 FALLS W/OUT INJ PAST YR: ICD-10-PCS | Mod: CPTII,S$GLB,, | Performed by: FAMILY MEDICINE

## 2019-04-03 PROCEDURE — 3074F SYST BP LT 130 MM HG: CPT | Mod: CPTII,S$GLB,, | Performed by: FAMILY MEDICINE

## 2019-04-03 PROCEDURE — 3044F PR MOST RECENT HEMOGLOBIN A1C LEVEL <7.0%: ICD-10-PCS | Mod: CPTII,S$GLB,, | Performed by: FAMILY MEDICINE

## 2019-04-03 PROCEDURE — 3078F DIAST BP <80 MM HG: CPT | Mod: CPTII,S$GLB,, | Performed by: FAMILY MEDICINE

## 2019-04-03 PROCEDURE — 87077 CULTURE AEROBIC IDENTIFY: CPT

## 2019-04-03 PROCEDURE — 99214 PR OFFICE/OUTPT VISIT, EST, LEVL IV, 30-39 MIN: ICD-10-PCS | Mod: S$GLB,,, | Performed by: FAMILY MEDICINE

## 2019-04-03 PROCEDURE — 99214 OFFICE O/P EST MOD 30 MIN: CPT | Mod: S$GLB,,, | Performed by: FAMILY MEDICINE

## 2019-04-03 PROCEDURE — 3078F PR MOST RECENT DIASTOLIC BLOOD PRESSURE < 80 MM HG: ICD-10-PCS | Mod: CPTII,S$GLB,, | Performed by: FAMILY MEDICINE

## 2019-04-03 PROCEDURE — 99999 PR PBB SHADOW E&M-EST. PATIENT-LVL IV: ICD-10-PCS | Mod: PBBFAC,,, | Performed by: FAMILY MEDICINE

## 2019-04-03 PROCEDURE — 3074F PR MOST RECENT SYSTOLIC BLOOD PRESSURE < 130 MM HG: ICD-10-PCS | Mod: CPTII,S$GLB,, | Performed by: FAMILY MEDICINE

## 2019-04-03 PROCEDURE — 99999 PR PBB SHADOW E&M-EST. PATIENT-LVL IV: CPT | Mod: PBBFAC,,, | Performed by: FAMILY MEDICINE

## 2019-04-03 PROCEDURE — 80048 BASIC METABOLIC PNL TOTAL CA: CPT

## 2019-04-03 RX ORDER — LOSARTAN POTASSIUM 25 MG/1
25 TABLET ORAL
COMMUNITY
Start: 2019-03-19

## 2019-04-03 RX ORDER — SULFAMETHOXAZOLE AND TRIMETHOPRIM 800; 160 MG/1; MG/1
1 TABLET ORAL 2 TIMES DAILY
Qty: 28 TABLET | Refills: 0 | Status: SHIPPED | OUTPATIENT
Start: 2019-04-03 | End: 2019-04-17

## 2019-04-03 NOTE — PROGRESS NOTES
Complains of dribbling urination yesterday with some dysuria last night.  Increased frequency.  He had some lower back discomfort.  No fever nausea vomiting or abdominal pain. Previous TURP with prostate cancer noted. Treated with radiation.  Has not seen Urology in at least a year.  Chronic kidney disease stage 3 with diabetes.  Due for laboratory.  Seeing outside nephrologist, but not clear what his last creatinine was.    Past Medical History:  Past Medical History:   Diagnosis Date    Atrial fibrillation     CAD (coronary artery disease)     CKD stage 3 due to type 2 diabetes mellitus 1/4/2018    Lab Results Component Value Date  CREATININE 2.04 (H) 01/03/2018  BUN 22 01/03/2018   01/03/2018  K 4.2 01/03/2018   01/03/2018  CO2 30 01/03/2018  BMP Lab Results Component Value Date   01/03/2018  K 4.2 01/03/2018   01/03/2018  CO2 30 01/03/2018  BUN 22 01/03/2018  CREATININE 2.04 (H) 01/03/2018  CALCIUM 8.7 01/03/2018  ANIONGAP 8 01/27/2015  ESTGFRAFRICA 38 (L) 01/03/201    Diabetes mellitus     Diabetes mellitus type II     Elevated PSA     History of colon polyps 1/8/2018    He has had colon polyps in the past and he is screened serially for this.     Hyperlipidemia associated with type 2 diabetes mellitus 7/10/2012    Hyperlipidemia LDL goal < 70     Hypertension     Hypertension goal BP (blood pressure) < 130/80     Mass of adrenal gland     Mild acid reflux     Prostate cancer     Skin cancer     Skin cancer (melanoma)      Past Surgical History:   Procedure Laterality Date    CARDIAC CATHETERIZATION  2/2015    CARDIOVERSION      CHOLECYSTECTOMY      COLONOSCOPY-obtain cardiology clearance N/A 2/1/2019    Performed by Rashid Engel III, [facility] at [facility]    CORONARY ARTERY BYPASS GRAFT      EYE SURGERY      mohs      radiation prostate      SKIN CANCER EXCISION      TONSILLECTOMY      TRANSURETHRAL RESECTION OF PROSTATE       Social History     Socioeconomic  History    Marital status:      Spouse name: Not on file    Number of children: Not on file    Years of education: Not on file    Highest education level: Not on file   Occupational History    Not on file   Social Needs    Financial resource strain: Not on file    Food insecurity:     Worry: Not on file     Inability: Not on file    Transportation needs:     Medical: Not on file     Non-medical: Not on file   Tobacco Use    Smoking status: Never Smoker    Smokeless tobacco: Never Used   Substance and Sexual Activity    Alcohol use: Yes     Comment: very rare    Drug use: No    Sexual activity: Not on file     Comment: once monthly   Lifestyle    Physical activity:     Days per week: Not on file     Minutes per session: Not on file    Stress: Not on file   Relationships    Social connections:     Talks on phone: Not on file     Gets together: Not on file     Attends Evangelical service: Not on file     Active member of club or organization: Not on file     Attends meetings of clubs or organizations: Not on file     Relationship status: Not on file   Other Topics Concern    Not on file   Social History Narrative    Not on file     Family History   Problem Relation Age of Onset    Stroke Mother     Hypertension Mother     Heart disease Father     Coronary artery disease Father     Diabetes Father     Heart disease Paternal Aunt     Stroke Maternal Grandmother      Review of patient's allergies indicates:   Allergen Reactions    Codeine      Other reaction(s): Hallucinations    Fish oil Rash and Itching     Current Outpatient Medications on File Prior to Visit   Medication Sig Dispense Refill    albuterol (PROVENTIL/VENTOLIN HFA) 90 mcg/actuation inhaler Inhale 2 puffs into the lungs every 6 (six) hours as needed for Wheezing. 18 g 1    amiodarone (PACERONE) 100 MG Tab Take 100 mg by mouth.      atorvastatin (LIPITOR) 80 MG tablet Take 1 tablet (80 mg total) by mouth once daily. 90  "tablet 1    benzonatate (TESSALON) 100 MG capsule TAKE ONE CAPSULE BY MOUTH EVERY 8 HOURS AS NEEDED FOR COUGH 100 capsule 3    BREEZE 2 TEST STRIPS Strp USE ONE STRIP TO CHECK GLUCOSE 3 TO 4 TIMES DAILY AS NEEDED 100 strip 11    calcitRIOL (ROCALTROL) 0.25 MCG Cap Use on daily during the week and two daily on weekends. 108 capsule 1    carvedilol (COREG) 3.125 MG tablet TAKE ONE-HALF TABLET BY MOUTH IN THE MORNING AND ONE-HALF TABLET IN THE EVENING 90 tablet 1    docusate sodium (STOOL SOFTENER) 100 MG capsule 3 (three) times daily as needed. Every day      famotidine (PEPCID) 20 MG tablet 2 (two) times daily. Every day      furosemide (LASIX) 40 MG tablet TAKE ONE TABLET BY MOUTH IN THE MORNING 90 tablet 1    guaiFENesin (MUCINEX) 600 mg 12 hr tablet Take 600 mg by mouth.      insulin (LANTUS SOLOSTAR U-100 INSULIN) glargine 100 units/mL (3mL) SubQ pen INJECT 52 UNITS            SUBCUTANEOUSLY ONCE DAILY (Patient taking differently: INJECT 46 UNITS            SUBCUTANEOUSLY ONCE DAILY) 45 mL 6    LANCETS & BLOOD GLUCOSE STRIPS MISC qid      levothyroxine (SYNTHROID) 88 MCG tablet Take 1 tablet (88 mcg total) by mouth once daily. 90 tablet 1    losartan (COZAAR) 25 MG tablet Take 25 mg by mouth.      metOLazone (ZAROXOLYN) 5 MG tablet Take 1 tablet (5 mg total) by mouth daily as needed. 90 tablet 1    nitroGLYCERIN (NITROSTAT) 0.4 MG SL tablet Place 0.4 mg under the tongue every 5 (five) minutes as needed for Chest pain.      pen needle, diabetic (PEN NEEDLE) 31 gauge x 5/16" Ndle USE ONE SYRINGE EVERY  each 3    phenazopyridine HCl (AZO ORAL) Take by mouth as needed.      rivaroxaban (XARELTO) 15 mg Tab Take 15 mg by mouth once daily.       sildenafil (VIAGRA) 100 MG tablet Take 50 mg by mouth.      spironolactone (ALDACTONE) 25 MG tablet Take 0.5 tablets (12.5 mg total) by mouth once daily. 1/2 Every day 45 tablet 1    levocetirizine (XYZAL) 5 MG tablet Take 1 tablet (5 mg total) by " "mouth every evening. for 10 days 10 tablet 0    [DISCONTINUED] benzonatate (TESSALON) 100 MG capsule TAKE ONE CAPSULE BY MOUTH EVERY 4 HOURS AS NEEDED 120 capsule 6    [DISCONTINUED] ramipril (ALTACE) 1.25 MG capsule Take 1 capsule (1.25 mg total) by mouth once daily. 90 capsule 1     No current facility-administered medications on file prior to visit.            ROS:  GENERAL: No fever, chills,  or significant weight changes.   CARDIOVASCULAR: Denies chest pain, PND, orthopnea or reduced exercise tolerance.  ABDOMEN: Appetite fine. Denies diarrhea, abdominal pain, hematemesis or blood in stool.  URINARY: No gross hematuria      OBJECTIVE:     Vitals:    04/03/19 1557   BP: (!) 109/59   Pulse: 62   Temp: 98 °F (36.7 °C)   Weight: 130.1 kg (286 lb 12.8 oz)   Height: 5' 10" (1.778 m)     Wt Readings from Last 3 Encounters:   04/03/19 130.1 kg (286 lb 12.8 oz)   02/01/19 127.5 kg (281 lb)   01/11/19 122.5 kg (270 lb)     APPEARANCE: Well nourished, well developed, in no acute distress.    HEAD: Normocephalic.  Atraumatic.  No sinus tenderness.  EYES:   Right eye: Pupil reactive.  Conjunctiva clear.    Left eye: Pupil reactive.  Conjunctiva clear.    NOSE:  clear.  MOUTH & THROAT:  No pharyngeal erythema or exudate. No lesions.  NECK: Supple. No bruits.  No JVD.  No cervical lymphadenopathy.  No thyromegaly.    CHEST: Breath sounds clear bilaterally.  Normal respiratory effort  CARDIOVASCULAR: Normal rate.  Regular rhythm.  No murmurs.  No rub.  No gallops.  ABDOMEN: Bowel sounds normal.  Soft.  No tenderness.  No organomegaly. No CVAT  PERIPHERAL VASCULAR: No cyanosis.  No clubbing.  No edema.  NEUROLOGIC: No focal findings.  MENTAL STATUS: Alert.  Oriented x 3.    Urinalysis consistent with UTI      Alfredo was seen today for back pain and painful urination.    Diagnoses and all orders for this visit:    Acute cystitis without hematuria  -     Urine culture; Future  -     Ambulatory referral to Urology  -     Urine " culture    Painful urination  -     Urinalysis    Prostate cancer  -     Urine culture; Future  -     Ambulatory referral to Urology  -     Urine culture    CKD stage 3 due to type 2 diabetes mellitus  -     Basic metabolic panel; Future    Other orders  -     Urinalysis Microscopic  -     sulfamethoxazole-trimethoprim 800-160mg (BACTRIM DS) 800-160 mg Tab; Take 1 tablet by mouth 2 (two) times daily. for 14 days      Check BMP to confirm creatinine.  May need to have the dose on the Bactrim adjusted.  Hemoglobin A1c today per Dr. Colon.  Follow-up appointment with Urology.  Follow-up if symptoms worsen or not improving with above.  ER precautions given.

## 2019-04-04 ENCOUNTER — TELEPHONE (OUTPATIENT)
Dept: FAMILY MEDICINE | Facility: CLINIC | Age: 69
End: 2019-04-04

## 2019-04-04 LAB
ESTIMATED AVG GLUCOSE: 154 MG/DL (ref 68–131)
HBA1C MFR BLD HPLC: 7 % (ref 4–5.6)

## 2019-04-04 RX ORDER — LIDOCAINE HYDROCHLORIDE 10 MG/ML
1 INJECTION INFILTRATION; PERINEURAL ONCE
Status: ACTIVE | OUTPATIENT
Start: 2019-04-04

## 2019-04-04 RX ORDER — CEFTRIAXONE 1 G/1
1 INJECTION, POWDER, FOR SOLUTION INTRAMUSCULAR; INTRAVENOUS ONCE
Status: DISCONTINUED | OUTPATIENT
Start: 2019-04-04 | End: 2019-05-13

## 2019-04-04 NOTE — TELEPHONE ENCOUNTER
----- Message from Emiliano Gutiérrez sent at 4/4/2019  9:07 AM CDT -----  Contact: pt   Type:  Needs Medical Advice    Who Called: pt   Symptoms (please be specific): bladder infection    How long has patient had these symptoms:    Pharmacy name and phone #:    Would the patient rather a call back or a response via [facility]? Phone   Best Call Back Number: [phone]  Additional Information: was in on yesterday and was given meds for a bladder infection/ pt states he woke up this morning with a fever and no other symptoms and wants to discuss

## 2019-04-04 NOTE — PROGRESS NOTES
Dr Randolph forwarded these results to the patient via [facility].  He will discuss the results with the patient in person at the next appointment.  The patient was instructed to make an appointment if he does not already have one scheduled.

## 2019-04-04 NOTE — TELEPHONE ENCOUNTER
Have him come in to get a shot of ceftriaxone 1 g IM.  Order placed.  This will provide some additional coverage  until we get the culture result back.  Stay on the Bactrim DS however recommend decrease 1/2 tablet twice a day as his kidney function is decreased.  Recommend he schedule follow-up appointment with his nephrologist Dr. Block

## 2019-04-04 NOTE — TELEPHONE ENCOUNTER
Woke up with 101 temp this am, informed to continue antibiotics, he reports he uses Tylenol on occasion, informed he may take for fever, but not any more often than recommended on bottle.  Informed once culture results are in, we will notify him if any changes necessary.  Advised ER for worsening, distress, or high temp that is not relieved by Tylenol.  Please advise if any further recommendations.

## 2019-04-04 NOTE — TELEPHONE ENCOUNTER
Patient informed, unable to come in today for shot, not sure about tomorrow, he will call back if able.  He was made aware to decrease Bactrim and will follow up with Dr Block

## 2019-04-05 LAB — BACTERIA UR CULT: NORMAL

## 2019-05-13 ENCOUNTER — OFFICE VISIT (OUTPATIENT)
Dept: PULMONOLOGY | Facility: CLINIC | Age: 69
End: 2019-05-13
Payer: COMMERCIAL

## 2019-05-13 VITALS
WEIGHT: 287.81 LBS | BODY MASS INDEX: 41.2 KG/M2 | HEART RATE: 68 BPM | RESPIRATION RATE: 18 BRPM | OXYGEN SATURATION: 96 % | SYSTOLIC BLOOD PRESSURE: 130 MMHG | DIASTOLIC BLOOD PRESSURE: 60 MMHG | HEIGHT: 70 IN

## 2019-05-13 DIAGNOSIS — J44.9 COPD, GROUP B, BY GOLD 2017 CLASSIFICATION: Primary | ICD-10-CM

## 2019-05-13 DIAGNOSIS — G47.33 OSA ON CPAP: ICD-10-CM

## 2019-05-13 DIAGNOSIS — E66.01 MORBID OBESITY WITH BMI OF 40.0-44.9, ADULT: Chronic | ICD-10-CM

## 2019-05-13 PROCEDURE — 1101F PR PT FALLS ASSESS DOC 0-1 FALLS W/OUT INJ PAST YR: ICD-10-PCS | Mod: CPTII,S$GLB,, | Performed by: INTERNAL MEDICINE

## 2019-05-13 PROCEDURE — 3078F DIAST BP <80 MM HG: CPT | Mod: CPTII,S$GLB,, | Performed by: INTERNAL MEDICINE

## 2019-05-13 PROCEDURE — 99999 PR PBB SHADOW E&M-EST. PATIENT-LVL III: ICD-10-PCS | Mod: PBBFAC,,, | Performed by: INTERNAL MEDICINE

## 2019-05-13 PROCEDURE — 99215 OFFICE O/P EST HI 40 MIN: CPT | Mod: S$GLB,,, | Performed by: INTERNAL MEDICINE

## 2019-05-13 PROCEDURE — 3075F SYST BP GE 130 - 139MM HG: CPT | Mod: CPTII,S$GLB,, | Performed by: INTERNAL MEDICINE

## 2019-05-13 PROCEDURE — 99215 PR OFFICE/OUTPT VISIT, EST, LEVL V, 40-54 MIN: ICD-10-PCS | Mod: S$GLB,,, | Performed by: INTERNAL MEDICINE

## 2019-05-13 PROCEDURE — 3078F PR MOST RECENT DIASTOLIC BLOOD PRESSURE < 80 MM HG: ICD-10-PCS | Mod: CPTII,S$GLB,, | Performed by: INTERNAL MEDICINE

## 2019-05-13 PROCEDURE — 1101F PT FALLS ASSESS-DOCD LE1/YR: CPT | Mod: CPTII,S$GLB,, | Performed by: INTERNAL MEDICINE

## 2019-05-13 PROCEDURE — 3075F PR MOST RECENT SYSTOLIC BLOOD PRESS GE 130-139MM HG: ICD-10-PCS | Mod: CPTII,S$GLB,, | Performed by: INTERNAL MEDICINE

## 2019-05-13 PROCEDURE — 99999 PR PBB SHADOW E&M-EST. PATIENT-LVL III: CPT | Mod: PBBFAC,,, | Performed by: INTERNAL MEDICINE

## 2019-05-13 NOTE — PROGRESS NOTES
Subjective:      Established patient    Patient ID: Alfredo Brennan is a 69 y.o. male.  Patient Active Problem List   Diagnosis    DM (diabetes mellitus)    Hyperlipidemia associated with type 2 diabetes mellitus    CAD (coronary artery disease)    Left ventricular systolic dysfunction    Hypertension associated with diabetes    Cardiomyopathy due to hypertension, with heart failure    Ischemic dilated cardiomyopathy    Mucopurulent chronic bronchitis    Morbid obesity with BMI of 40.0-44.9, adult    SERGEY on CPAP    CKD stage 3 due to type 2 diabetes mellitus    History of colon polyps    Acquired hypothyroidism    Change in bowel habits    Prostate cancer    COPD, group B, by GOLD 2017 classification         Group Spirometric Classification Exacerbations / year mMRC CAT   Group B: Low risk; more symptoms  GOLD 1- 2  < = 1 >= 2 >=10     FEV1: 2.08  (64.3 % predicted)      Problem list has been reviewed.        Chief Complaint: Sleep Apnea and COPD        HPI    Recoveing from a recent URI. Currently on Oral AUGMENTIN. He has 3 days left.     Patients reports NYHA II dyspnea    The patient does not have currently have symptoms / an exacerbation.      No recent change in breathing.     COPD QUESTIONNAIRE  How often do you cough?: Some of the time  How often do you have phlegm (mucus) in your chest?: Most of the time  How often does your chest feel tight?: Some of the time  When you walk up a hill or one flight of stairs, how often are you breathless?: All of the time  How often are you limited doing any activities at home?: Most of the time  How often are you confident leaving the house despite your lung condition?: Some of the time  How often do you sleep soundly?: Most of the time  How often do you have energy?: Almost never  Total score: 26     His current respiratory therapy regimen is ALBUTEROL which provides relief. He is  adherent with his regimen.      He  is on CPAP  of  9.5 CMWP. Patient  denies snoring, headaches, excessive daytime sleepiness.  He is complaint with his CPAP. He definitely thinks PAP is beneficial to his health and he wants to continue with PAP therapy.    [place] Sleepiness Scale   EPWORTH SLEEPINESS SCALE 5/13/2019 12/4/2018 10/12/2018 9/25/2017 9/12/2016 9/10/2015 7/2/2015   Sitting and reading 2 0 1 0 1 1 1   Watching TV 2 1 1 0 1 0 1   Sitting, inactive in a public place (e.g. a theatre or a meeting) 2 1 1 0 0 0 0   As a passenger in a car for an hour without a break 2 1 0 2 0 0 0   Lying down to rest in the afternoon when circumstances permit 3 1 3 3 0 1 1   Sitting and talking to someone 0 0 0 0 0 0 0   Sitting quietly after a lunch without alcohol 1 1 0 0 0 0 0   In a car, while stopped for a few minutes in traffic 0 0 0 0 0 0 0   Total score 12 5 6 5 2 2 3       Compliance Summary  2/12/2019 - 5/12/2019 (90 days)  Days with Device Usage 89 days  Days without Device Usage 1 day  Percent Days with Device Usage 98.9%  Cumulative Usage 29 days 53 mins. 44 secs.  Maximum Usage (1 Day) 12 hrs. 15 mins. 19 secs.  Average Usage (All Days) 7 hrs. 44 mins. 35 secs.  Average Usage (Days Used) 7 hrs. 49 mins. 49 secs.  Minimum Usage (1 Day) 3 hrs. 4 mins. 48 secs.  Percent of Days with Usage >= 4 Hours 94.4%  Percent of Days with Usage < 4 Hours 5.6%  Total Blower Time 29 days 53 mins. 44 secs.  CPAP Summary ([facility])  Average Time in Large Leak Per Day 38 secs.  Average AHI 1.1  CPAP 9.5 cmH2O      A full  review of systems, past , family  and social histories was performed except as mentioned in the note above, these are non contributory to the main issues discussed today.       Previous Report Reviewed: lab reports, office notes and radiology reports     The following portions of the patient's history were reviewed and updated as appropriate: He  has a past medical history of Atrial fibrillation, CAD (coronary artery disease), CKD stage 3 due to type 2 diabetes mellitus  (1/4/2018), Diabetes mellitus, Diabetes mellitus type II, Elevated PSA, History of colon polyps (1/8/2018), Hyperlipidemia associated with type 2 diabetes mellitus (7/10/2012), Hyperlipidemia LDL goal < 70, Hypertension, Hypertension goal BP (blood pressure) < 130/80, Mass of adrenal gland, Mild acid reflux, Prostate cancer, Skin cancer, and Skin cancer (melanoma).  He  has a past surgical history that includes Tonsillectomy; Cholecystectomy; Coronary artery bypass graft; Eye surgery; Cardiac catheterization (2/2015); Cardioversion; Skin cancer excision; mohs; Colonoscopy (N/A, 2/1/2019); Transurethral resection of prostate; and radiation prostate.  His family history includes Coronary artery disease in his father; Diabetes in his father; Heart disease in his father and paternal aunt; Hypertension in his mother; Stroke in his maternal grandmother and mother.  He  reports that he has never smoked. He has never used smokeless tobacco. He reports that he drinks alcohol. He reports that he does not use drugs.  He has a current medication list which includes the following prescription(s): albuterol, amiodarone, atorvastatin, benzonatate, breeze 2 test strips, calcitriol, carvedilol, docusate sodium, famotidine, furosemide, guaifenesin, insulin, lancets/blood glucose strips, levocetirizine, levothyroxine, losartan, metolazone, nitroglycerin, pen needle, diabetic, phenazopyridine hcl, rivaroxaban, sildenafil, spironolactone, and umeclidinium-vilanterol, and the following Facility-Administered Medications: lidocaine hcl 10 mg/ml (1%).  He is allergic to codeine and fish oil..    Review of Systems   Constitutional: Negative for fever and chills.   HENT: Positive for rhinorrhea and congestion. Negative for nosebleeds and sore throat.    Eyes: Negative for itching.   Respiratory: Positive for cough and dyspnea on extertion. Negative for chest tightness.    Cardiovascular: Negative for chest pain and leg swelling.  "  Genitourinary: Negative for difficulty urinating and hematuria.   Endocrine: Negative for polydipsia, cold intolerance and heat intolerance.    Musculoskeletal: Positive for back pain. Negative for arthralgias.   Skin: Negative for rash.   Gastrointestinal: Negative for nausea, vomiting, abdominal pain, abdominal distention and acid reflux.   Neurological: Negative for dizziness, syncope, light-headedness and headaches.   Hematological: Excessive bruising. Does not bruise/bleed easily.   Psychiatric/Behavioral: The patient is nervous/anxious.         Objective:     /60   Pulse 68   Resp 18   Ht 5' 10" (1.778 m)   Wt 130.6 kg (287 lb 13 oz)   SpO2 96%   BMI 41.30 kg/m²   Body mass index is 41.3 kg/m².     Physical Exam   Constitutional: He is oriented to person, place, and time. He appears well-developed.   Morbidly Obese   HENT:   Head: Normocephalic and atraumatic.   Eyes: Pupils are equal, round, and reactive to light. EOM are normal.   Neck: Normal range of motion. Neck supple.   Cardiovascular: Normal rate. An irregularly irregular rhythm present.   Pulmonary/Chest: Effort normal and breath sounds normal.   Abdominal: Soft. Bowel sounds are normal.   Musculoskeletal: Normal range of motion.   Neurological: He is alert and oriented to person, place, and time.   Skin: Skin is warm and dry.   Psychiatric: He has a normal mood and affect. His behavior is normal.       Personal Diagnostic Review      Assessment / Plan:       Discussed diagnosis, its evaluation, treatment and usual course. All questions answered.    Problem List Items Addressed This Visit        Pulmonary    COPD, group B, by GOLD 2017 classification - Primary    Current Assessment & Plan     COPD ROS: taking medications as instructed, no medication side effects noted, no significant ongoing wheezing or shortness of breath, using bronchodilator MDI less than twice a week.   New concerns: Recovering from a recent URI. .   Exam: appears " well, vitals normal, no respiratory distress, acyanotic, normal RR.   Assessment:  COPD stable.   Plan: Start ANORO per orders. Continue ALBUTEROL. Current treatment plan is effective, no change in therapy. PFT and CXR IN 6 MONTHS.          Relevant Medications    umeclidinium-vilanterol (ANORO ELLIPTA) 62.5-25 mcg/actuation DsDv    Other Relevant Orders    Complete PFT without bronchodilator - Clinic    X-Ray Chest PA And Lateral       Endocrine    Morbid obesity with BMI of 40.0-44.9, adult (Chronic)    Overview     He has morbid obesity and he is not dieting.  This was discussed today.  He is not exercising.             Current Assessment & Plan     General weight loss/lifestyle modification strategies discussed (elicit support from others; identify saboteurs; non-food rewards, etc).  Diet interventions: low calorie (1000 kCal/d) deficit diet.            Other    SERGEY on CPAP    Overview     He has SERGEY and he is on a cpap nightly.           Current Assessment & Plan     Continue CPAP of  9.5 CMWP. (DME - [facility])     Discussed therapeutic goals for positive airway pressure therapy(CPAP or BiPAP): Ideal is usage 100% of nights for 6 - 8 hours per night. Minimum usage is 70% of night for at least 4 hours per night used. Pateint expressed understanding. All Questions answered.    CPAP supplies renewed.            Relevant Orders    CPAP/BIPAP SUPPLIES            TIME SPENT WITH PATIENT: Time spent: 45 minutes in face to face  discussion concerning diagnosis, prognosis, review of lab and test results, benefits of treatment as well as management of disease, counseling of patient and coordination of care between various health  care providers . Greater than half the time spent was used for coordination of care and counseling of patient.       Follow up in about 6 months (around 11/13/2019) for SERGEY, COPD.

## 2019-05-13 NOTE — ASSESSMENT & PLAN NOTE
COPD ROS: taking medications as instructed, no medication side effects noted, no significant ongoing wheezing or shortness of breath, using bronchodilator MDI less than twice a week.   New concerns: Recovering from a recent URI. .   Exam: appears well, vitals normal, no respiratory distress, acyanotic, normal RR.   Assessment:  COPD stable.   Plan: Start ANORO per orders. Continue ALBUTEROL. Current treatment plan is effective, no change in therapy. PFT and CXR IN 6 MONTHS.

## 2019-05-13 NOTE — PATIENT INSTRUCTIONS
Lung Anatomy  Your lungs take air in to give your body oxygen, which the body needs to work. Your lungs, like all the tissues in your body, are made up of billions of tiny specialized cells. Old lung cells die and are replaced by new, identical lung cells. This natural process helps ensure healthy lungs.    Date Last Reviewed: 11/1/2016  © 7336-4791 [employer]. [address] [place] [address], [place] [address]. All rights reserved. This information is not intended as a substitute for professional medical care. Always follow your healthcare professional's instructions.

## 2019-05-15 ENCOUNTER — PATIENT MESSAGE (OUTPATIENT)
Dept: PULMONOLOGY | Facility: CLINIC | Age: 69
End: 2019-05-15

## 2019-05-20 ENCOUNTER — OFFICE VISIT (OUTPATIENT)
Dept: UROLOGY | Facility: CLINIC | Age: 69
End: 2019-05-20
Payer: COMMERCIAL

## 2019-05-20 ENCOUNTER — LAB VISIT (OUTPATIENT)
Dept: LAB | Facility: HOSPITAL | Age: 69
End: 2019-05-20
Attending: UROLOGY
Payer: COMMERCIAL

## 2019-05-20 VITALS — BODY MASS INDEX: 41.22 KG/M2 | WEIGHT: 287.94 LBS | HEIGHT: 70 IN

## 2019-05-20 DIAGNOSIS — E27.8 ADRENAL MASS: ICD-10-CM

## 2019-05-20 DIAGNOSIS — C61 PROSTATE CANCER: ICD-10-CM

## 2019-05-20 DIAGNOSIS — N20.0 RENAL STONE: Primary | ICD-10-CM

## 2019-05-20 DIAGNOSIS — N20.0 RENAL STONE: ICD-10-CM

## 2019-05-20 LAB — COMPLEXED PSA SERPL-MCNC: 0.12 NG/ML (ref 0–4)

## 2019-05-20 PROCEDURE — 1101F PR PT FALLS ASSESS DOC 0-1 FALLS W/OUT INJ PAST YR: ICD-10-PCS | Mod: CPTII,S$GLB,, | Performed by: UROLOGY

## 2019-05-20 PROCEDURE — 99214 OFFICE O/P EST MOD 30 MIN: CPT | Mod: S$GLB,,, | Performed by: UROLOGY

## 2019-05-20 PROCEDURE — 99214 PR OFFICE/OUTPT VISIT, EST, LEVL IV, 30-39 MIN: ICD-10-PCS | Mod: S$GLB,,, | Performed by: UROLOGY

## 2019-05-20 PROCEDURE — 36415 COLL VENOUS BLD VENIPUNCTURE: CPT

## 2019-05-20 PROCEDURE — 84153 ASSAY OF PSA TOTAL: CPT

## 2019-05-20 PROCEDURE — 99999 PR PBB SHADOW E&M-EST. PATIENT-LVL IV: CPT | Mod: PBBFAC,,, | Performed by: UROLOGY

## 2019-05-20 PROCEDURE — 1101F PT FALLS ASSESS-DOCD LE1/YR: CPT | Mod: CPTII,S$GLB,, | Performed by: UROLOGY

## 2019-05-20 PROCEDURE — 99999 PR PBB SHADOW E&M-EST. PATIENT-LVL IV: ICD-10-PCS | Mod: PBBFAC,,, | Performed by: UROLOGY

## 2019-05-20 NOTE — PROGRESS NOTES
Chief Complaint: UTI?    HPI:   5/20/19: 68 yo man seen by Dr. Randolph for left adrenal lipoma and other concerns.  Had a UTI with Abx about 5 wks ago fully resolved.   No abd/pelvic pain and no exac/rel factors.  No hematuria.  No urolithiasis.  No urinary bother.  No other  history.  Had a TURP.  Dx with prostate cancer and had XRT after his TURP about 2 years ago.  PSA low after that.    Allergies:  Codeine and Fish oil    Medications:  has a current medication list which includes the following prescription(s): albuterol, amiodarone, atorvastatin, benzonatate, breeze 2 test strips, calcitriol, carvedilol, docusate sodium, famotidine, furosemide, guaifenesin, insulin, lancets/blood glucose strips, levocetirizine, levothyroxine, losartan, metolazone, nitroglycerin, pen needle, diabetic, phenazopyridine hcl, rivaroxaban, sildenafil, spironolactone, and umeclidinium-vilanterol, and the following Facility-Administered Medications: lidocaine hcl 10 mg/ml (1%).    Review of Systems:  General: No fever, chills, fatigability, or weight loss.  Skin: No rashes, itching, or changes in color or texture of skin.  Chest: Denies SPEARS, cyanosis, wheezing, cough, and sputum production.  Abdomen: Appetite fine. No weight loss. Denies diarrhea, abdominal pain, hematemesis, or blood in stool.  Musculoskeletal: No joint stiffness or swelling. Denies back pain.  : As above.  All other review of systems negative.    PMH:   has a past medical history of Atrial fibrillation, CAD (coronary artery disease), CKD stage 3 due to type 2 diabetes mellitus (1/4/2018), Diabetes mellitus, Diabetes mellitus type II, Elevated PSA, History of colon polyps (1/8/2018), Hyperlipidemia associated with type 2 diabetes mellitus (7/10/2012), Hyperlipidemia LDL goal < 70, Hypertension, Hypertension goal BP (blood pressure) < 130/80, Mass of adrenal gland, Mild acid reflux, Prostate cancer, Skin cancer, and Skin cancer (melanoma).    PSH:   has a past  surgical history that includes Tonsillectomy; Cholecystectomy; Coronary artery bypass graft; Eye surgery; Cardiac catheterization (2/2015); Cardioversion; Skin cancer excision; mohs; Colonoscopy (N/A, 2/1/2019); Transurethral resection of prostate; and radiation prostate.    FamHx: family history includes Coronary artery disease in his father; Diabetes in his father; Heart disease in his father and paternal aunt; Hypertension in his mother; Stroke in his maternal grandmother and mother.    SocHx:  reports that he has never smoked. He has never used smokeless tobacco. He reports that he drinks alcohol. He reports that he does not use drugs.      Physical Exam:  There were no vitals filed for this visit.  General: A&Ox3, no apparent distress, no deformities  Neck: No masses, normal thyroid  Lungs: normal inspiration, no use of accessory muscles  Heart: normal pulse, no arrhythmias  Abdomen: Soft, NT, ND, no masses, no hernias, no hepatosplenomegaly  Lymphatic: Neck and groin nodes negative  Skin: The skin is warm and dry. No jaundice.  Ext: No c/c/e.  : deferred    Labs/Studies:   Urinalysis performed in clinic, summary: UA normal exc 50 gluc    Impression/Plan:   1. Discussed DM control and CHF  2. PSA today and PSA 6/12 with RTC 12 mo  3. KUB/US now and in a year

## 2019-05-20 NOTE — LETTER
May 20, 2019      Henri Singh MD  [address]  [place] [address]           [facility]  [address] [place] [id number]  Phone: [phone]  Fax: [phone]          Patient: Alfredo Brennan   MR Number: [id number]   YOB: 1950   Date of Visit: 5/20/2019       Dear Dr. Henri Singh:    Thank you for referring Alfredo Brennan to me for evaluation. Attached you will find relevant portions of my assessment and plan of care.    If you have questions, please do not hesitate to call me. I look forward to following Alfredo Brennan along with you.    Sincerely,    Yury Bose IV, MD    Enclosure  CC:  No Recipients    If you would like to receive this communication electronically, please contact [email or web address] or [phone] to request more information on [facility] access.    For providers and/or their staff who would like to refer a patient to [facility], please contact us through our one-stop-shop provider referral line, [facility], at [phone].    If you feel you have received this communication in error or would no longer like to receive these types of communications, please e-mail [email or web address]

## 2019-05-27 ENCOUNTER — HOSPITAL ENCOUNTER (OUTPATIENT)
Dept: RADIOLOGY | Facility: HOSPITAL | Age: 69
Discharge: HOME | End: 2019-05-27
Attending: UROLOGY
Payer: COMMERCIAL

## 2019-05-27 DIAGNOSIS — E27.8 ADRENAL MASS: ICD-10-CM

## 2019-05-27 DIAGNOSIS — C61 PROSTATE CANCER: ICD-10-CM

## 2019-05-27 DIAGNOSIS — N20.0 RENAL STONE: ICD-10-CM

## 2019-05-27 PROCEDURE — 74018 RADEX ABDOMEN 1 VIEW: CPT | Mod: 26,,, | Performed by: RADIOLOGY

## 2019-05-27 PROCEDURE — 74018 RADEX ABDOMEN 1 VIEW: CPT | Mod: TC,PO

## 2019-05-27 PROCEDURE — 76770 US RETROPERITONEAL COMPLETE: ICD-10-PCS | Mod: 26,,, | Performed by: RADIOLOGY

## 2019-05-27 PROCEDURE — 76770 US EXAM ABDO BACK WALL COMP: CPT | Mod: TC,PO

## 2019-05-27 PROCEDURE — 76770 US EXAM ABDO BACK WALL COMP: CPT | Mod: 26,,, | Performed by: RADIOLOGY

## 2019-05-27 PROCEDURE — 74018 XR ABDOMEN AP 1 VIEW: ICD-10-PCS | Mod: 26,,, | Performed by: RADIOLOGY

## 2019-08-12 DIAGNOSIS — E11.59 HYPERTENSION ASSOCIATED WITH DIABETES: ICD-10-CM

## 2019-08-12 DIAGNOSIS — I15.2 HYPERTENSION ASSOCIATED WITH DIABETES: ICD-10-CM

## 2019-08-12 RX ORDER — SPIRONOLACTONE 25 MG/1
25 TABLET ORAL DAILY
Qty: 90 TABLET | Refills: 3 | Status: SHIPPED | OUTPATIENT
Start: 2019-08-12 | End: 2021-01-27

## 2019-08-13 NOTE — TELEPHONE ENCOUNTER
I refilled the requested medication x 1 month.    The patient is due for an visit in the office.  Call the patient on the phone and book the patient with Jordana Martin NP for a visit.     PLEASE DOCUMENT THE FACT THAT YOU HAVE CONTACTED THE PATIENT IN THE CHART FOR FUTURE REFERENCE.    Health Maintenance Due   Topic Date Due    Eye Exam  07/21/2018    Foot Exam  01/08/2019    Lipid Panel  05/08/2019    Urine Microalbumin  07/24/2019

## 2020-01-27 DIAGNOSIS — E11.9 TYPE 2 DIABETES MELLITUS WITHOUT COMPLICATION: ICD-10-CM

## 2020-01-28 LAB
CHOLEST SERPL-MCNC: 144 MG/DL (ref 0–200)
HDL/CHOLESTEROL RATIO: 4.1 (ref 0–5)
HDLC SERPL-MCNC: 35 MG/DL
LDLC SERPL CALC-MCNC: 81 MG/DL (ref 0–160)
NON HDL CHOL (CALC): 109 (ref 0–130)
TRIGL SERPL-MCNC: 184 MG/DL (ref 0–150)

## 2020-04-28 NOTE — PROGRESS NOTES
"Subjective:      Alfredo Brennan is a 67 y.o. 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  The patient has no symptoms of a functioning adrenal mass.  Previous metabolic workup was negative.  No pain.    Patient also a small nonobstructive renal stones for which I'm referring him back to Dr. Hiral Gallo his urologist on the [place].  I'm releasing him for the myolipoma.  He is also followed by nephrology Dr. Booker on the [place]    The following portions of the patient's history were reviewed and updated as appropriate: allergies, current medications, past family history, past medical history, past social history, past surgical history and problem list.    Review of Systems  Pertinent items are noted in HPI.  A comprehensive multipoint review of systems was negative except as otherwise stated in the HPI.     Objective:   Vitals: /66 (BP Location: Right arm, Patient Position: Sitting, BP Method: Automatic)   Pulse (!) 59   Ht 5' 9" (1.753 m)   Wt 129.3 kg (285 lb)   BMI 42.09 kg/m²     Physical Exam   General: alert and oriented, no acute distress  Respiratory: Symmetric expansion, non-labored breathing  Cardiovascular: no peripheral edema  Abdomen: soft, non distended  Skin: normal coloration and turgor, no rashes, no suspicious skin lesions noted  Neuro: no gross deficits  Psych: normal judgment and insight, normal mood/affect and non-anxious    Physical Exam    Lab Review   Urinalysis demonstrates negative for all components  Lab Results   Component Value Date    WBC 7.33 01/27/2015    HGB 12.6 (L) 01/27/2015    HCT 37.0 (L) 01/27/2015    MCV 88 01/27/2015     01/27/2015     Lab Results   Component Value Date    CREATININE 2.0 03/06/2017    BUN 33 03/06/2017       Imaging   MRI shows that the lesion in the left adrenal gland is essentially stable.  There has been no significant enlargement.    Assessment and Plan:   Renal " stones  -     POCT URINE DIPSTICK WITHOUT MICROSCOPE  We discussed dietary modifications.  He will follow up with Dr. Justus Gallo his nephrologist on the [place] and his nephrologist Dr. Delgado.  For management of his stone disease    Myelolipoma of left adrenal gland  The case is been reviewed multiple times at  oncology conference.  All present agreed that surgery does not appear necessary for a stable lesion consistent with myolipoma particularly in a patient with CHF on anticoagulation         POCT URINE DIPSTICK WITHOUT MICROSCOPE  -     MRI Abdomen W WO Contrast; Future; Expected date: 06/01/2018  -     Basic metabolic panel; Future; Expected date: 06/02/2018    Return to clinic 1 year with repeat MRI or sooner if any issues related to the adrenal mass         Body Location Override (Optional - Billing Will Still Be Based On Selected Body Map Location If Applicable): right lower abdomen Detail Level: Detailed Depth Of Biopsy: dermis Was A Bandage Applied: Yes Size Of Lesion In Cm: 0.3 Biopsy Type: H and E Biopsy Method: Dermablade Anesthesia Type: 1% lidocaine with epinephrine Anesthesia Volume In Cc (Will Not Render If 0): 0.5 Additional Anesthesia Volume In Cc (Will Not Render If 0): 0 Hemostasis: Aluminum Chloride Wound Care: Petrolatum Dressing: bandage Destruction After The Procedure: No Type Of Destruction Used: Curettage Curettage Text: The wound bed was treated with curettage after the biopsy was performed. Cryotherapy Text: The wound bed was treated with cryotherapy after the biopsy was performed. Electrodesiccation Text: The wound bed was treated with electrodesiccation after the biopsy was performed. Electrodesiccation And Curettage Text: The wound bed was treated with electrodesiccation and curettage after the biopsy was performed. Silver Nitrate Text: The wound bed was treated with silver nitrate after the biopsy was performed. Lab: 6 Lab Facility: 3 Consent: Written consent was obtained and risks were reviewed including but not limited to scarring, infection, bleeding, scabbing, incomplete removal, nerve damage and allergy to anesthesia. Post-Care Instructions: I reviewed with the patient in detail post-care instructions. Patient is to keep the biopsy site dry overnight, and then clean site twice daily and apply vaseline twice daily until healed. Notification Instructions: Patient will be notified of biopsy results. However, patient instructed to call the office if not contacted within 2 weeks. Billing Type: Third-Party Bill Information: Selecting Yes will display possible errors in your note based on the variables you have selected. This validation is only offered as a suggestion for you. PLEASE NOTE THAT THE VALIDATION TEXT WILL BE REMOVED WHEN YOU FINALIZE YOUR NOTE. IF YOU WANT TO FAX A PRELIMINARY NOTE YOU WILL NEED TO TOGGLE THIS TO 'NO' IF YOU DO NOT WANT IT IN YOUR FAXED NOTE.

## 2020-05-05 ENCOUNTER — TELEPHONE (OUTPATIENT)
Dept: RADIOLOGY | Facility: HOSPITAL | Age: 70
End: 2020-05-05

## 2020-05-07 ENCOUNTER — HOSPITAL ENCOUNTER (OUTPATIENT)
Dept: RADIOLOGY | Facility: HOSPITAL | Age: 70
Discharge: HOME | End: 2020-05-07
Attending: UROLOGY
Payer: COMMERCIAL

## 2020-05-07 DIAGNOSIS — N20.0 RENAL STONE: ICD-10-CM

## 2020-05-07 DIAGNOSIS — E27.8 ADRENAL MASS: ICD-10-CM

## 2020-05-07 DIAGNOSIS — C61 PROSTATE CANCER: ICD-10-CM

## 2020-05-07 PROCEDURE — 76770 US RETROPERITONEAL COMPLETE: ICD-10-PCS | Mod: 26,,, | Performed by: RADIOLOGY

## 2020-05-07 PROCEDURE — 76770 US EXAM ABDO BACK WALL COMP: CPT | Mod: TC,PO

## 2020-05-07 PROCEDURE — 76770 US EXAM ABDO BACK WALL COMP: CPT | Mod: 26,,, | Performed by: RADIOLOGY

## 2020-05-13 ENCOUNTER — PATIENT MESSAGE (OUTPATIENT)
Dept: FAMILY MEDICINE | Facility: CLINIC | Age: 70
End: 2020-05-13

## 2020-06-23 LAB — HBA1C MFR BLD: 9.3 % (ref 4–6)

## 2020-07-17 DIAGNOSIS — E11.9 TYPE 2 DIABETES MELLITUS WITHOUT COMPLICATION: ICD-10-CM

## 2020-08-05 ENCOUNTER — PATIENT OUTREACH (OUTPATIENT)
Dept: ADMINISTRATIVE | Facility: HOSPITAL | Age: 70
End: 2020-08-05

## 2020-08-05 NOTE — PROGRESS NOTES
Working MSSP report.   Attempted to call patient to schedule overdue annual with PCP. Last seen by pcp in 2018.  No answer. Left message.

## 2020-08-17 ENCOUNTER — PATIENT OUTREACH (OUTPATIENT)
Dept: ADMINISTRATIVE | Facility: HOSPITAL | Age: 70
End: 2020-08-17

## 2020-09-08 ENCOUNTER — PATIENT OUTREACH (OUTPATIENT)
Dept: ADMINISTRATIVE | Facility: HOSPITAL | Age: 70
End: 2020-09-08

## 2020-09-15 NOTE — PROGRESS NOTES
9/16/2020      To Whom It May Concern:    I have examined Chichi Faust and find that she should change her work schedule to an 8 hour shift because of severe pain on her right knee.  She will be seen by an orthopedic specialist on September 23. Further recommendation pending office visit with the orthopedic specialist.        ______________________________________________  Signed      Dante Vang M.D.  Internal Medicine  [address] [place] [address] [phone]       "Subjective:      Patient ID: Alfredo Brennan is a 68 y.o. male.    Chief Complaint: Emesis and Diarrhea    HPI   Patient to clinic with report of diarrhea onset 2 nights ago.  He reports he was having watery BMs multiple times during the day and night.  He then had vomiting last night.  He reports associated abdominal pain with the diarrhea and nausea and vomiting.  He reports his symptoms have improved some today and has not had any diarrhea or vomiting since last night.  He does admit to still having some nausea and some abdominal soreness.  He denies fever.  He reports he and his wife ate the same foods and she is not sick.  He had taken Lomotil for his diarrhea and this helped.    Review of Systems   Constitutional: Negative for chills, fatigue and fever.   Respiratory: Negative for cough, shortness of breath and wheezing.    Cardiovascular: Negative for chest pain, palpitations and leg swelling.   Gastrointestinal: Positive for abdominal distention, abdominal pain, diarrhea, nausea and vomiting. Negative for blood in stool.   Skin: Negative for rash and wound.   Neurological: Negative for weakness and numbness.   Psychiatric/Behavioral: The patient is not nervous/anxious.        Objective:     BP (!) 92/55   Pulse (!) 56   Temp 98.9 °F (37.2 °C) (Oral)   Ht 5' 10" (1.778 m)   Wt 128.3 kg (282 lb 13.6 oz)   BMI 40.58 kg/m²     Physical Exam   Constitutional: He is oriented to person, place, and time. He appears well-developed and well-nourished.   HENT:   Head: Normocephalic.   Eyes: Pupils are equal, round, and reactive to light.   Cardiovascular: Normal rate, regular rhythm and normal heart sounds.    Pulmonary/Chest: Effort normal and breath sounds normal. No respiratory distress. He has no decreased breath sounds. He has no wheezes. He has no rhonchi. He has no rales.   Abdominal: Soft. Bowel sounds are normal. He exhibits no distension and no mass. There is generalized tenderness. There is no rigidity, " no rebound, no guarding and no CVA tenderness.   Lymphadenopathy:     He has no cervical adenopathy.   Neurological: He is alert and oriented to person, place, and time.   Skin: Skin is warm and dry. No rash noted.   Psychiatric: He has a normal mood and affect. His behavior is normal. Judgment and thought content normal.   Vitals reviewed.    Assessment:     1. Gastroenteritis        Plan:     Problem List Items Addressed This Visit     None      Visit Diagnoses     Gastroenteritis    -  Primary    Relevant Medications    ondansetron (ZOFRAN) 4 MG tablet      Bowel rest today  Haverstraw diet starting tomorrow  Hydrate slowly  Symptomatic care discussed and educational handout provided  Report to ER if symptoms worsen    Follow-up if symptoms worsen or fail to improve.

## 2020-09-18 ENCOUNTER — PATIENT OUTREACH (OUTPATIENT)
Dept: ADMINISTRATIVE | Facility: HOSPITAL | Age: 70
End: 2020-09-18

## 2020-09-29 ENCOUNTER — PATIENT MESSAGE (OUTPATIENT)
Dept: OTHER | Facility: OTHER | Age: 70
End: 2020-09-29

## 2020-10-06 ENCOUNTER — PATIENT MESSAGE (OUTPATIENT)
Dept: ADMINISTRATIVE | Facility: HOSPITAL | Age: 70
End: 2020-10-06

## 2020-11-25 ENCOUNTER — PATIENT MESSAGE (OUTPATIENT)
Dept: FAMILY MEDICINE | Facility: CLINIC | Age: 70
End: 2020-11-25

## 2020-12-11 ENCOUNTER — PATIENT MESSAGE (OUTPATIENT)
Dept: OTHER | Facility: OTHER | Age: 70
End: 2020-12-11

## 2021-01-27 ENCOUNTER — OFFICE VISIT (OUTPATIENT)
Dept: FAMILY MEDICINE | Facility: CLINIC | Age: 71
End: 2021-01-27
Payer: COMMERCIAL

## 2021-01-27 VITALS
BODY MASS INDEX: 38.94 KG/M2 | HEART RATE: 72 BPM | SYSTOLIC BLOOD PRESSURE: 117 MMHG | HEIGHT: 70 IN | WEIGHT: 272 LBS | TEMPERATURE: 98 F | DIASTOLIC BLOOD PRESSURE: 59 MMHG

## 2021-01-27 DIAGNOSIS — Z79.4 CONTROLLED TYPE 2 DIABETES MELLITUS WITH STAGE 3 CHRONIC KIDNEY DISEASE, WITH LONG-TERM CURRENT USE OF INSULIN: ICD-10-CM

## 2021-01-27 DIAGNOSIS — E78.5 HYPERLIPIDEMIA ASSOCIATED WITH TYPE 2 DIABETES MELLITUS: ICD-10-CM

## 2021-01-27 DIAGNOSIS — E11.59 HYPERTENSION ASSOCIATED WITH DIABETES: ICD-10-CM

## 2021-01-27 DIAGNOSIS — N18.4 STAGE 4 CHRONIC KIDNEY DISEASE: ICD-10-CM

## 2021-01-27 DIAGNOSIS — J44.9 COPD, GROUP B, BY GOLD 2017 CLASSIFICATION: ICD-10-CM

## 2021-01-27 DIAGNOSIS — I25.10 CORONARY ARTERY DISEASE, ANGINA PRESENCE UNSPECIFIED, UNSPECIFIED VESSEL OR LESION TYPE, UNSPECIFIED WHETHER NATIVE OR TRANSPLANTED HEART: ICD-10-CM

## 2021-01-27 DIAGNOSIS — I15.2 HYPERTENSION ASSOCIATED WITH DIABETES: ICD-10-CM

## 2021-01-27 DIAGNOSIS — N18.30 CONTROLLED TYPE 2 DIABETES MELLITUS WITH STAGE 3 CHRONIC KIDNEY DISEASE, WITH LONG-TERM CURRENT USE OF INSULIN: ICD-10-CM

## 2021-01-27 DIAGNOSIS — E66.01 MORBID OBESITY WITH BMI OF 40.0-44.9, ADULT: Chronic | ICD-10-CM

## 2021-01-27 DIAGNOSIS — Z79.4 TYPE 2 DIABETES MELLITUS WITHOUT COMPLICATION, WITH LONG-TERM CURRENT USE OF INSULIN: Primary | ICD-10-CM

## 2021-01-27 DIAGNOSIS — C61 PROSTATE CANCER: ICD-10-CM

## 2021-01-27 DIAGNOSIS — Z23 IMMUNIZATION DUE: ICD-10-CM

## 2021-01-27 DIAGNOSIS — I48.21 PERMANENT ATRIAL FIBRILLATION: ICD-10-CM

## 2021-01-27 DIAGNOSIS — I25.119 ATHEROSCLEROSIS OF NATIVE CORONARY ARTERY OF NATIVE HEART WITH ANGINA PECTORIS: ICD-10-CM

## 2021-01-27 DIAGNOSIS — I50.42 CHRONIC COMBINED SYSTOLIC (CONGESTIVE) AND DIASTOLIC (CONGESTIVE) HEART FAILURE: ICD-10-CM

## 2021-01-27 DIAGNOSIS — E11.3593 TYPE 2 DIABETES MELLITUS WITH BOTH EYES AFFECTED BY PROLIFERATIVE RETINOPATHY WITHOUT MACULAR EDEMA, WITH LONG-TERM CURRENT USE OF INSULIN: ICD-10-CM

## 2021-01-27 DIAGNOSIS — E11.9 TYPE 2 DIABETES MELLITUS WITHOUT COMPLICATION, WITH LONG-TERM CURRENT USE OF INSULIN: Primary | ICD-10-CM

## 2021-01-27 DIAGNOSIS — Z79.4 TYPE 2 DIABETES MELLITUS WITH BOTH EYES AFFECTED BY PROLIFERATIVE RETINOPATHY WITHOUT MACULAR EDEMA, WITH LONG-TERM CURRENT USE OF INSULIN: ICD-10-CM

## 2021-01-27 DIAGNOSIS — N25.81 SECONDARY HYPERPARATHYROIDISM OF RENAL ORIGIN: ICD-10-CM

## 2021-01-27 DIAGNOSIS — E11.22 CONTROLLED TYPE 2 DIABETES MELLITUS WITH STAGE 3 CHRONIC KIDNEY DISEASE, WITH LONG-TERM CURRENT USE OF INSULIN: ICD-10-CM

## 2021-01-27 DIAGNOSIS — E11.69 HYPERLIPIDEMIA ASSOCIATED WITH TYPE 2 DIABETES MELLITUS: ICD-10-CM

## 2021-01-27 DIAGNOSIS — G47.33 OSA ON CPAP: ICD-10-CM

## 2021-01-27 PROCEDURE — 99999 PR PBB SHADOW E&M-EST. PATIENT-LVL IV: CPT | Mod: PBBFAC,,, | Performed by: FAMILY MEDICINE

## 2021-01-27 PROCEDURE — 99214 OFFICE O/P EST MOD 30 MIN: CPT | Mod: PBBFAC,PO | Performed by: FAMILY MEDICINE

## 2021-01-27 PROCEDURE — 99999 PR PBB SHADOW E&M-EST. PATIENT-LVL IV: ICD-10-PCS | Mod: PBBFAC,,, | Performed by: FAMILY MEDICINE

## 2021-01-27 PROCEDURE — 99214 OFFICE O/P EST MOD 30 MIN: CPT | Mod: S$GLB,,, | Performed by: FAMILY MEDICINE

## 2021-01-27 PROCEDURE — 99214 PR OFFICE/OUTPT VISIT, EST, LEVL IV, 30-39 MIN: ICD-10-PCS | Mod: S$GLB,,, | Performed by: FAMILY MEDICINE

## 2021-01-27 RX ORDER — DABIGATRAN ETEXILATE MESYLATE 75 MG/1
CAPSULE ORAL 2 TIMES DAILY
COMMUNITY
Start: 2021-01-12

## 2021-01-27 RX ORDER — POTASSIUM CHLORIDE 20 MEQ/1
20 TABLET, EXTENDED RELEASE ORAL 3 TIMES DAILY
Qty: 270 TABLET | Refills: 3 | Status: SHIPPED | OUTPATIENT
Start: 2021-01-27 | End: 2022-07-20

## 2021-01-27 RX ORDER — METOPROLOL SUCCINATE 25 MG/1
25 TABLET, EXTENDED RELEASE ORAL
COMMUNITY
Start: 2020-07-01

## 2021-01-27 RX ORDER — POTASSIUM CHLORIDE 20 MEQ/1
20 TABLET, EXTENDED RELEASE ORAL 3 TIMES DAILY
Qty: 270 TABLET | Refills: 3 | Status: SHIPPED | OUTPATIENT
Start: 2021-01-27 | End: 2021-01-27 | Stop reason: SDUPTHER

## 2021-01-27 RX ORDER — INSULIN GLARGINE 100 [IU]/ML
INJECTION, SOLUTION SUBCUTANEOUS
Qty: 45 ML | Refills: 6 | Status: SHIPPED | OUTPATIENT
Start: 2021-01-27 | End: 2022-01-19

## 2021-01-27 RX ORDER — INSULIN GLARGINE 100 [IU]/ML
INJECTION, SOLUTION SUBCUTANEOUS
Qty: 45 ML | Refills: 6 | Status: SHIPPED | OUTPATIENT
Start: 2021-01-27 | End: 2021-01-27 | Stop reason: SDUPTHER

## 2021-01-27 RX ORDER — ATORVASTATIN CALCIUM 80 MG/1
80 TABLET, FILM COATED ORAL DAILY
Qty: 90 TABLET | Refills: 1
Start: 2021-01-27

## 2021-01-27 RX ORDER — POTASSIUM CHLORIDE 20 MEQ/1
20 TABLET, EXTENDED RELEASE ORAL
COMMUNITY
Start: 2020-06-25 | End: 2021-01-27

## 2021-02-03 ENCOUNTER — PATIENT MESSAGE (OUTPATIENT)
Dept: FAMILY MEDICINE | Facility: CLINIC | Age: 71
End: 2021-02-03

## 2021-02-03 DIAGNOSIS — E11.9 TYPE 2 DIABETES MELLITUS WITHOUT COMPLICATION: ICD-10-CM

## 2021-02-03 PROBLEM — C73 THYROID CANCER: Status: ACTIVE | Noted: 2021-02-03

## 2021-03-12 LAB
LEFT EYE DM RETINOPATHY: POSITIVE
RIGHT EYE DM RETINOPATHY: POSITIVE

## 2021-04-01 ENCOUNTER — PATIENT OUTREACH (OUTPATIENT)
Dept: ADMINISTRATIVE | Facility: HOSPITAL | Age: 71
End: 2021-04-01

## 2021-04-21 LAB
LEFT EYE DM RETINOPATHY: POSITIVE
RIGHT EYE DM RETINOPATHY: POSITIVE

## 2021-05-11 ENCOUNTER — PATIENT OUTREACH (OUTPATIENT)
Dept: ADMINISTRATIVE | Facility: HOSPITAL | Age: 71
End: 2021-05-11

## 2021-07-21 DIAGNOSIS — E11.9 TYPE 2 DIABETES MELLITUS WITHOUT COMPLICATION: ICD-10-CM

## 2021-08-04 ENCOUNTER — PATIENT MESSAGE (OUTPATIENT)
Dept: ADMINISTRATIVE | Facility: HOSPITAL | Age: 71
End: 2021-08-04

## 2021-11-03 ENCOUNTER — PATIENT MESSAGE (OUTPATIENT)
Dept: ADMINISTRATIVE | Facility: HOSPITAL | Age: 71
End: 2021-11-03
Payer: COMMERCIAL

## 2021-12-30 LAB
ALBUMIN SERPL BCP-MCNC: 3.8 G/DL (ref 3.6–5.1)
ALBUMIN/GLOBULIN RATIO: 1
ALP SERPL-CCNC: 84 U/L (ref 25–125)
ALT: 9 (ref 9–46)
AST: 11 (ref 10–35)
BILIRUB SERPL-MCNC: 0.7 MG/DL (ref 0.2–1.7)
BUN/CREAT SERPL: 14 (ref 6–22)
CALCIUM SERPL-MCNC: 10.7 MG/DL (ref ?–10.3)
CHLORIDE SERPL-SCNC: 97 MMOL/L (ref 99–108)
CO2 SERPL-SCNC: 30 MMOL/L (ref 20–32)
CREAT SERPL-MCNC: 1.87 MG/DL (ref ?–1.18)
EST. GFR  (NON AFRICAN AMERICAN): 35 ML/MIN/1.73 M2 (ref 60–?)
GLOBULIN SER-MCNC: 3.8 G/DL (ref ?–3.7)
GLUCOSE SERPL-MCNC: 165 MG/DL (ref ?–99)
POTASSIUM SERPL-SCNC: 3.7 MMOL/L (ref 3.5–5.3)
PROT SERPL-MCNC: 7.6 G/DL (ref 6.1–8.1)
SODIUM BLD-SCNC: 136 MMOL/L (ref 135–146)
UREA NITROGEN (BUN): 27 MG/DL (ref ?–25)

## 2022-01-12 LAB
CHOLEST SERPL-MSCNC: 143 MG/DL (ref 0–200)
CHOLEST SERPL-MSCNC: 143 MG/DL (ref 0–200)
HBA1C MFR BLD: 9.9 %
HBA1C MFR BLD: 9.9 % (ref ?–5.7)
HDL/CHOLESTEROL RATIO: 4.2 % (ref ?–5)
HDL/CHOLESTEROL RATIO: 4.2 % (ref ?–5)
HDLC SERPL-MCNC: 34 MG/DL (ref 35–70)
HDLC SERPL-MCNC: 34 MG/DL (ref 35–70)
LDL-C: 81 (ref ?–100)
LDLC SERPL CALC-MCNC: 81 MG/DL (ref ?–100)
NON HDL CHOL (CALC): 109 (ref ?–130)
NON HDL CHOL (CALC): 109 (ref ?–130)
TRIGL SERPL-MCNC: 188 MG/DL (ref ?–150)
TRIGL SERPL-MCNC: 188 MG/DL (ref ?–150)

## 2022-01-14 ENCOUNTER — TELEPHONE (OUTPATIENT)
Dept: FAMILY MEDICINE | Facility: CLINIC | Age: 72
End: 2022-01-14
Payer: COMMERCIAL

## 2022-01-14 ENCOUNTER — OFFICE VISIT (OUTPATIENT)
Dept: FAMILY MEDICINE | Facility: CLINIC | Age: 72
End: 2022-01-14
Payer: COMMERCIAL

## 2022-01-14 VITALS
SYSTOLIC BLOOD PRESSURE: 112 MMHG | WEIGHT: 267 LBS | DIASTOLIC BLOOD PRESSURE: 62 MMHG | BODY MASS INDEX: 38.22 KG/M2 | HEIGHT: 70 IN | HEART RATE: 68 BPM | TEMPERATURE: 98 F

## 2022-01-14 DIAGNOSIS — E11.69 HYPERLIPIDEMIA ASSOCIATED WITH TYPE 2 DIABETES MELLITUS: ICD-10-CM

## 2022-01-14 DIAGNOSIS — E78.5 HYPERLIPIDEMIA ASSOCIATED WITH TYPE 2 DIABETES MELLITUS: ICD-10-CM

## 2022-01-14 DIAGNOSIS — E11.22 CKD STAGE 3 DUE TO TYPE 2 DIABETES MELLITUS: ICD-10-CM

## 2022-01-14 DIAGNOSIS — Z79.4 CONTROLLED TYPE 2 DIABETES MELLITUS WITH STAGE 3 CHRONIC KIDNEY DISEASE, WITH LONG-TERM CURRENT USE OF INSULIN: ICD-10-CM

## 2022-01-14 DIAGNOSIS — N18.30 CKD STAGE 3 DUE TO TYPE 2 DIABETES MELLITUS: ICD-10-CM

## 2022-01-14 DIAGNOSIS — Z79.4 TYPE 2 DIABETES MELLITUS WITH BOTH EYES AFFECTED BY PROLIFERATIVE RETINOPATHY WITHOUT MACULAR EDEMA, WITH LONG-TERM CURRENT USE OF INSULIN: ICD-10-CM

## 2022-01-14 DIAGNOSIS — E11.22 CONTROLLED TYPE 2 DIABETES MELLITUS WITH STAGE 3 CHRONIC KIDNEY DISEASE, WITH LONG-TERM CURRENT USE OF INSULIN: ICD-10-CM

## 2022-01-14 DIAGNOSIS — N18.30 CONTROLLED TYPE 2 DIABETES MELLITUS WITH STAGE 3 CHRONIC KIDNEY DISEASE, WITH LONG-TERM CURRENT USE OF INSULIN: ICD-10-CM

## 2022-01-14 DIAGNOSIS — E11.3593 TYPE 2 DIABETES MELLITUS WITH BOTH EYES AFFECTED BY PROLIFERATIVE RETINOPATHY WITHOUT MACULAR EDEMA, WITH LONG-TERM CURRENT USE OF INSULIN: ICD-10-CM

## 2022-01-14 PROCEDURE — 99999 PR PBB SHADOW E&M-EST. PATIENT-LVL V: ICD-10-PCS | Mod: PBBFAC,,, | Performed by: FAMILY MEDICINE

## 2022-01-14 PROCEDURE — 3008F BODY MASS INDEX DOCD: CPT | Mod: CPTII,S$GLB,, | Performed by: FAMILY MEDICINE

## 2022-01-14 PROCEDURE — 1126F AMNT PAIN NOTED NONE PRSNT: CPT | Mod: CPTII,S$GLB,, | Performed by: FAMILY MEDICINE

## 2022-01-14 PROCEDURE — 3074F SYST BP LT 130 MM HG: CPT | Mod: CPTII,S$GLB,, | Performed by: FAMILY MEDICINE

## 2022-01-14 PROCEDURE — 3288F PR FALLS RISK ASSESSMENT DOCUMENTED: ICD-10-PCS | Mod: CPTII,S$GLB,, | Performed by: FAMILY MEDICINE

## 2022-01-14 PROCEDURE — 1101F PR PT FALLS ASSESS DOC 0-1 FALLS W/OUT INJ PAST YR: ICD-10-PCS | Mod: CPTII,S$GLB,, | Performed by: FAMILY MEDICINE

## 2022-01-14 PROCEDURE — 3078F PR MOST RECENT DIASTOLIC BLOOD PRESSURE < 80 MM HG: ICD-10-PCS | Mod: CPTII,S$GLB,, | Performed by: FAMILY MEDICINE

## 2022-01-14 PROCEDURE — 3288F FALL RISK ASSESSMENT DOCD: CPT | Mod: CPTII,S$GLB,, | Performed by: FAMILY MEDICINE

## 2022-01-14 PROCEDURE — 3078F DIAST BP <80 MM HG: CPT | Mod: CPTII,S$GLB,, | Performed by: FAMILY MEDICINE

## 2022-01-14 PROCEDURE — 3046F HEMOGLOBIN A1C LEVEL >9.0%: CPT | Mod: CPTII,S$GLB,, | Performed by: FAMILY MEDICINE

## 2022-01-14 PROCEDURE — 3074F PR MOST RECENT SYSTOLIC BLOOD PRESSURE < 130 MM HG: ICD-10-PCS | Mod: CPTII,S$GLB,, | Performed by: FAMILY MEDICINE

## 2022-01-14 PROCEDURE — 1101F PT FALLS ASSESS-DOCD LE1/YR: CPT | Mod: CPTII,S$GLB,, | Performed by: FAMILY MEDICINE

## 2022-01-14 PROCEDURE — 3046F PR MOST RECENT HEMOGLOBIN A1C LEVEL > 9.0%: ICD-10-PCS | Mod: CPTII,S$GLB,, | Performed by: FAMILY MEDICINE

## 2022-01-14 PROCEDURE — 99999 PR PBB SHADOW E&M-EST. PATIENT-LVL V: CPT | Mod: PBBFAC,,, | Performed by: FAMILY MEDICINE

## 2022-01-14 PROCEDURE — 1126F PR PAIN SEVERITY QUANTIFIED, NO PAIN PRESENT: ICD-10-PCS | Mod: CPTII,S$GLB,, | Performed by: FAMILY MEDICINE

## 2022-01-14 PROCEDURE — 99214 OFFICE O/P EST MOD 30 MIN: CPT | Mod: S$GLB,,, | Performed by: FAMILY MEDICINE

## 2022-01-14 PROCEDURE — 99214 PR OFFICE/OUTPT VISIT, EST, LEVL IV, 30-39 MIN: ICD-10-PCS | Mod: S$GLB,,, | Performed by: FAMILY MEDICINE

## 2022-01-14 PROCEDURE — 3008F PR BODY MASS INDEX (BMI) DOCUMENTED: ICD-10-PCS | Mod: CPTII,S$GLB,, | Performed by: FAMILY MEDICINE

## 2022-01-14 RX ORDER — ALOGLIPTIN 6.25 MG/1
6.25 TABLET, FILM COATED ORAL DAILY PRN
COMMUNITY

## 2022-01-14 RX ORDER — ALLOPURINOL 100 MG/1
TABLET ORAL
COMMUNITY
Start: 2021-10-08

## 2022-01-14 NOTE — TELEPHONE ENCOUNTER
----- Message from Poonam Villalpando sent at 1/14/2022 10:19 AM CST -----  Contact: wife  Type:  Sooner Apoointment Request    Caller is requesting a sooner appointment.  Caller declined first available appointment listed below.  Caller will not accept being placed on the waitlist and is requesting a message be sent to doctor.  Name of Caller: Evy(wife)  When is the first available appointment? 2/16/2022  Symptoms: check up  Would the patient rather a call back or a response via [facility]? callback  Best Call Back Number: [phone]  Additional Information:

## 2022-01-19 ENCOUNTER — PATIENT OUTREACH (OUTPATIENT)
Dept: ADMINISTRATIVE | Facility: HOSPITAL | Age: 72
End: 2022-01-19
Payer: COMMERCIAL

## 2022-01-19 ENCOUNTER — OFFICE VISIT (OUTPATIENT)
Dept: FAMILY MEDICINE | Facility: CLINIC | Age: 72
End: 2022-01-19
Payer: COMMERCIAL

## 2022-01-19 VITALS
SYSTOLIC BLOOD PRESSURE: 110 MMHG | HEIGHT: 70 IN | TEMPERATURE: 98 F | HEART RATE: 66 BPM | DIASTOLIC BLOOD PRESSURE: 63 MMHG | BODY MASS INDEX: 38.1 KG/M2 | WEIGHT: 266.13 LBS

## 2022-01-19 DIAGNOSIS — J06.9 UPPER RESPIRATORY TRACT INFECTION, UNSPECIFIED TYPE: Primary | ICD-10-CM

## 2022-01-19 DIAGNOSIS — H65.191 OTHER NON-RECURRENT ACUTE NONSUPPURATIVE OTITIS MEDIA OF RIGHT EAR: ICD-10-CM

## 2022-01-19 LAB
CTP QC/QA: YES
SARS-COV-2 RDRP RESP QL NAA+PROBE: NEGATIVE

## 2022-01-19 PROCEDURE — 99999 PR PBB SHADOW E&M-EST. PATIENT-LVL V: ICD-10-PCS | Mod: PBBFAC,,, | Performed by: STUDENT IN AN ORGANIZED HEALTH CARE EDUCATION/TRAINING PROGRAM

## 2022-01-19 PROCEDURE — 1160F PR REVIEW ALL MEDS BY PRESCRIBER/CLIN PHARMACIST DOCUMENTED: ICD-10-PCS | Mod: CPTII,S$GLB,, | Performed by: STUDENT IN AN ORGANIZED HEALTH CARE EDUCATION/TRAINING PROGRAM

## 2022-01-19 PROCEDURE — 3074F PR MOST RECENT SYSTOLIC BLOOD PRESSURE < 130 MM HG: ICD-10-PCS | Mod: CPTII,S$GLB,, | Performed by: STUDENT IN AN ORGANIZED HEALTH CARE EDUCATION/TRAINING PROGRAM

## 2022-01-19 PROCEDURE — 3008F BODY MASS INDEX DOCD: CPT | Mod: CPTII,S$GLB,, | Performed by: STUDENT IN AN ORGANIZED HEALTH CARE EDUCATION/TRAINING PROGRAM

## 2022-01-19 PROCEDURE — 1125F AMNT PAIN NOTED PAIN PRSNT: CPT | Mod: CPTII,S$GLB,, | Performed by: STUDENT IN AN ORGANIZED HEALTH CARE EDUCATION/TRAINING PROGRAM

## 2022-01-19 PROCEDURE — 1101F PT FALLS ASSESS-DOCD LE1/YR: CPT | Mod: CPTII,S$GLB,, | Performed by: STUDENT IN AN ORGANIZED HEALTH CARE EDUCATION/TRAINING PROGRAM

## 2022-01-19 PROCEDURE — 1159F MED LIST DOCD IN RCRD: CPT | Mod: CPTII,S$GLB,, | Performed by: STUDENT IN AN ORGANIZED HEALTH CARE EDUCATION/TRAINING PROGRAM

## 2022-01-19 PROCEDURE — 3078F DIAST BP <80 MM HG: CPT | Mod: CPTII,S$GLB,, | Performed by: STUDENT IN AN ORGANIZED HEALTH CARE EDUCATION/TRAINING PROGRAM

## 2022-01-19 PROCEDURE — 3074F SYST BP LT 130 MM HG: CPT | Mod: CPTII,S$GLB,, | Performed by: STUDENT IN AN ORGANIZED HEALTH CARE EDUCATION/TRAINING PROGRAM

## 2022-01-19 PROCEDURE — U0002 COVID-19 LAB TEST NON-CDC: HCPCS | Mod: QW,S$GLB,, | Performed by: STUDENT IN AN ORGANIZED HEALTH CARE EDUCATION/TRAINING PROGRAM

## 2022-01-19 PROCEDURE — 99213 PR OFFICE/OUTPT VISIT, EST, LEVL III, 20-29 MIN: ICD-10-PCS | Mod: S$GLB,,, | Performed by: STUDENT IN AN ORGANIZED HEALTH CARE EDUCATION/TRAINING PROGRAM

## 2022-01-19 PROCEDURE — 1160F RVW MEDS BY RX/DR IN RCRD: CPT | Mod: CPTII,S$GLB,, | Performed by: STUDENT IN AN ORGANIZED HEALTH CARE EDUCATION/TRAINING PROGRAM

## 2022-01-19 PROCEDURE — 3008F PR BODY MASS INDEX (BMI) DOCUMENTED: ICD-10-PCS | Mod: CPTII,S$GLB,, | Performed by: STUDENT IN AN ORGANIZED HEALTH CARE EDUCATION/TRAINING PROGRAM

## 2022-01-19 PROCEDURE — 3288F FALL RISK ASSESSMENT DOCD: CPT | Mod: CPTII,S$GLB,, | Performed by: STUDENT IN AN ORGANIZED HEALTH CARE EDUCATION/TRAINING PROGRAM

## 2022-01-19 PROCEDURE — U0002: ICD-10-PCS | Mod: QW,S$GLB,, | Performed by: STUDENT IN AN ORGANIZED HEALTH CARE EDUCATION/TRAINING PROGRAM

## 2022-01-19 PROCEDURE — 1125F PR PAIN SEVERITY QUANTIFIED, PAIN PRESENT: ICD-10-PCS | Mod: CPTII,S$GLB,, | Performed by: STUDENT IN AN ORGANIZED HEALTH CARE EDUCATION/TRAINING PROGRAM

## 2022-01-19 PROCEDURE — 3078F PR MOST RECENT DIASTOLIC BLOOD PRESSURE < 80 MM HG: ICD-10-PCS | Mod: CPTII,S$GLB,, | Performed by: STUDENT IN AN ORGANIZED HEALTH CARE EDUCATION/TRAINING PROGRAM

## 2022-01-19 PROCEDURE — 3288F PR FALLS RISK ASSESSMENT DOCUMENTED: ICD-10-PCS | Mod: CPTII,S$GLB,, | Performed by: STUDENT IN AN ORGANIZED HEALTH CARE EDUCATION/TRAINING PROGRAM

## 2022-01-19 PROCEDURE — 1101F PR PT FALLS ASSESS DOC 0-1 FALLS W/OUT INJ PAST YR: ICD-10-PCS | Mod: CPTII,S$GLB,, | Performed by: STUDENT IN AN ORGANIZED HEALTH CARE EDUCATION/TRAINING PROGRAM

## 2022-01-19 PROCEDURE — 99213 OFFICE O/P EST LOW 20 MIN: CPT | Mod: S$GLB,,, | Performed by: STUDENT IN AN ORGANIZED HEALTH CARE EDUCATION/TRAINING PROGRAM

## 2022-01-19 PROCEDURE — 1159F PR MEDICATION LIST DOCUMENTED IN MEDICAL RECORD: ICD-10-PCS | Mod: CPTII,S$GLB,, | Performed by: STUDENT IN AN ORGANIZED HEALTH CARE EDUCATION/TRAINING PROGRAM

## 2022-01-19 PROCEDURE — 99999 PR PBB SHADOW E&M-EST. PATIENT-LVL V: CPT | Mod: PBBFAC,,, | Performed by: STUDENT IN AN ORGANIZED HEALTH CARE EDUCATION/TRAINING PROGRAM

## 2022-01-19 RX ORDER — INSULIN ASPART 100 [IU]/ML
5 INJECTION, SUSPENSION SUBCUTANEOUS DAILY
COMMUNITY

## 2022-01-19 RX ORDER — CIPROFLOXACIN AND DEXAMETHASONE 3; 1 MG/ML; MG/ML
4 SUSPENSION/ DROPS AURICULAR (OTIC) 2 TIMES DAILY
Qty: 7.5 ML | Refills: 0 | Status: SHIPPED | OUTPATIENT
Start: 2022-01-19 | End: 2022-07-20

## 2022-01-19 NOTE — PROGRESS NOTES
Covid-19 vaccination records received and uploaded into media.  Uploaded progress/PCP Visit notes into media  Manually Hyperlinked LIPID PANEL and HGB A1C  from outside Facility

## 2022-01-19 NOTE — PROGRESS NOTES
SUBJECTIVE:   Alfredo Brennan is a 71 y.o. male who complains of post nasal drip, dry cough and right ear pain for 2 days. He denies a history of chest pain, fevers, nausea, shortness of breath, sweats, vomiting and weakness. Has been using Flonase regularly. Reports chronic ear infections s/p tympanostomy as a child; however no AOM since childhood.      OBJECTIVE:  Vitals:    01/19/22 1445   BP: 110/63   Pulse: 66   Temp: 98.1 °F (36.7 °C)     He appears well, vital signs are as noted. L ear wnl; R ear with erythema, non bulging TM.  Throat and pharynx normal.  Neck supple. No adenopathy in the neck. Nose is congested. Sinuses non tender. The chest is clear, without wheezes or rales.    ASSESSMENT:   viral upper respiratory illness and otitis media    PLAN:  Will send otic antibx drops to pharm. Symptomatic therapy suggested: push fluids, rest and return office visit prn if symptoms persist or worsen. Call or return to clinic prn if these symptoms worsen or fail to improve as anticipated.

## 2022-01-21 ENCOUNTER — TELEPHONE (OUTPATIENT)
Dept: FAMILY MEDICINE | Facility: CLINIC | Age: 72
End: 2022-01-21
Payer: COMMERCIAL

## 2022-01-21 DIAGNOSIS — H65.193 OTHER NON-RECURRENT ACUTE NONSUPPURATIVE OTITIS MEDIA OF BOTH EARS: Primary | ICD-10-CM

## 2022-01-21 RX ORDER — AMOXICILLIN AND CLAVULANATE POTASSIUM 875; 125 MG/1; MG/1
1 TABLET, FILM COATED ORAL 2 TIMES DAILY
Qty: 14 TABLET | Refills: 0 | Status: SHIPPED | OUTPATIENT
Start: 2022-01-21 | End: 2022-01-28

## 2022-01-21 NOTE — PROGRESS NOTES
Manually entered HA1c, Lipid panel, & CMP from outside lab. Historical colon uploaded & linked.

## 2022-01-21 NOTE — TELEPHONE ENCOUNTER
Advised pt that nothing available today, advised of clinic in the morning but they requested message be sent. States his other ear is now hurting, and his cough(productive) and congestion (head/chest) has gotten worse.     Wife states he is in CHF

## 2022-01-21 NOTE — TELEPHONE ENCOUNTER
Ashley Nielsen MD  P [facility] Staff  Caller: Unspecified (Today,  3:33 PM)  Sent Augmentin to pharm             pts wife notified

## 2022-01-21 NOTE — TELEPHONE ENCOUNTER
----- Message from Zenaida Venegas sent at 1/21/2022  3:25 PM CST -----  Pt is requesting a call back. Pt states that his other ear is hurting and he has upper resp issues. Pt is requesting to be seen today. Pt can be reached at [phone]

## 2022-01-24 NOTE — PROGRESS NOTES
Subjective:      Patient ID: Alfredo Brennan is a 71 y.o. male.    Chief Complaint: Follow-up    Problem List Items Addressed This Visit     CKD stage 3 due to type 2 diabetes mellitus    Overview       He has CKD and he is followed by Dr. Kendall on this.  He has never had dialysis.    He had a CREAT of 1.8 on Aniket 10 2022.          Relevant Medications    alogliptin 6.25 mg Tab    Hyperlipidemia associated with type 2 diabetes mellitus    Overview     The patient presents with hyperlipidemia.  The patient reports tolerating the medication well and is in excellent compliance.  There have been no medication side effects.  The patient denies chest pain, neuropathy, and myalgias.  The patient has reduced fat intake and has been exercising.     His LDL on 1/12/22 was 86.             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  The patient denies polyuria, polydipsia, polyphagia, hypoglycemia and paresthesias.  The patient's glucose control has been good.  Home glucose averages are routinely checked.  The patient is without retinopathy currently.  The patient has no history of neuropathy.  The patient currently complains of no podiatric problems.  The patient has excellent compliance.  He had an a1c of 9.9 on 1/12/2022.  He is only taking lantus.  He was asked by his endocrinologist to take novolog and levimir.  He has not started either.           Relevant Medications    alogliptin 6.25 mg Tab      Other Visit Diagnoses     Controlled type 2 diabetes mellitus with stage 3 chronic kidney disease, with long-term current use of insulin        Relevant Medications    alogliptin 6.25 mg Tab          Past Medical History:  Past Medical History:   Diagnosis Date    Atrial fibrillation     CAD (coronary artery disease)     CKD stage 3  due to type 2 diabetes mellitus 1/4/2018    Lab Results Component Value Date  CREATININE 2.04 (H) 01/03/2018  BUN 22 01/03/2018   01/03/2018  K 4.2 01/03/2018   01/03/2018  CO2 30 01/03/2018  BMP Lab Results Component Value Date   01/03/2018  K 4.2 01/03/2018   01/03/2018  CO2 30 01/03/2018  BUN 22 01/03/2018  CREATININE 2.04 (H) 01/03/2018  CALCIUM 8.7 01/03/2018  ANIONGAP 8 01/27/2015  ESTGFRAFRICA 38 (L) 01/03/201    Diabetes mellitus     Diabetes mellitus type II     Elevated PSA     History of colon polyps 1/8/2018    He has had colon polyps in the past and he is screened serially for this.     Hyperlipidemia associated with type 2 diabetes mellitus 7/10/2012    Hyperlipidemia LDL goal < 70     Hypertension     Hypertension goal BP (blood pressure) < 130/80     Mass of adrenal gland     Mild acid reflux     Prostate cancer     Skin cancer     Skin cancer (melanoma)      Past Surgical History:   Procedure Laterality Date    CARDIAC CATHETERIZATION  2/2015    CARDIOVERSION      CHOLECYSTECTOMY      COLONOSCOPY N/A 2/1/2019    Procedure: COLONOSCOPY-obtain cardiology clearance;  Surgeon: Rashid Engel III, MD;  Location: [facility];  Service: Endoscopy;  Laterality: N/A;    CORONARY ARTERY BYPASS GRAFT      EYE SURGERY      mohs      radiation prostate      SKIN CANCER EXCISION      TONSILLECTOMY      TOTAL THYROIDECTOMY      removed due to cancer.    TRANSURETHRAL RESECTION OF PROSTATE       Review of patient's allergies indicates:   Allergen Reactions    Codeine      Other reaction(s): Hallucinations    Fish oil Rash and Itching     Current Outpatient Medications on File Prior to Visit   Medication Sig Dispense Refill    albuterol (PROVENTIL/VENTOLIN HFA) 90 mcg/actuation inhaler Inhale 2 puffs into the lungs every 6 (six) hours as needed for Wheezing. 18 g 1    allopurinoL (ZYLOPRIM) 100 MG tablet       atorvastatin (LIPITOR) 80 MG tablet Take 1 tablet (80  "mg total) by mouth once daily. 90 tablet 1    calcitRIOL (ROCALTROL) 0.25 MCG Cap Use on daily during the week and two daily on weekends. 108 capsule 1    docusate sodium (COLACE) 100 MG capsule 3 (three) times daily as needed. Every day      famotidine (PEPCID) 20 MG tablet 2 (two) times daily. Every day      levothyroxine (SYNTHROID) 88 MCG tablet Take 1 tablet (88 mcg total) by mouth once daily. (Patient taking differently: Take 112 mcg by mouth once daily.) 90 tablet 1    metOLazone (ZAROXOLYN) 5 MG tablet Take 1 tablet (5 mg total) by mouth daily as needed. 90 tablet 1    metoprolol succinate (TOPROL-XL) 25 MG 24 hr tablet Take 25 mg by mouth.      nitroGLYCERIN (NITROSTAT) 0.4 MG SL tablet Place 0.4 mg under the tongue every 5 (five) minutes as needed for Chest pain.      potassium chloride SA (K-DUR,KLOR-CON) 20 MEQ tablet Take 1 tablet (20 mEq total) by mouth 3 (three) times daily. 270 tablet 3    PRADAXA 75 mg Cap       alogliptin 6.25 mg Tab Take 6.25 mg by mouth daily as needed.      amiodarone (PACERONE) 100 MG Tab Take 100 mg by mouth.      BREEZE 2 TEST STRIPS Strp USE ONE STRIP TO CHECK GLUCOSE 3 TO 4 TIMES DAILY AS NEEDED 100 strip 11    guaiFENesin (MUCINEX) 600 mg 12 hr tablet Take 600 mg by mouth.      LANCETS & BLOOD GLUCOSE STRIPS MISC qid      levocetirizine (XYZAL) 5 MG tablet Take 1 tablet (5 mg total) by mouth every evening. for 10 days 10 tablet 0    losartan (COZAAR) 25 MG tablet Take 25 mg by mouth.      pen needle, diabetic (PEN NEEDLE) 31 gauge x 5/16" Ndle USE ONE SYRINGE EVERY  each 3    phenazopyridine HCl (AZO ORAL) Take by mouth as needed.      rivaroxaban (XARELTO) 15 mg Tab Take 15 mg by mouth once daily.       sildenafil (VIAGRA) 100 MG tablet Take 50 mg by mouth.      umeclidinium-vilanterol (ANORO ELLIPTA) 62.5-25 mcg/actuation DsDv Inhale 1 puff into the lungs once daily. (Patient not taking: No sig reported) 30 each 11     Current " "Facility-Administered Medications on File Prior to Visit   Medication Dose Route Frequency Provider Last Rate Last Admin    lidocaine HCL 10 mg/ml (1%) injection 1 mL  1 mL Intramuscular Once Henri Singh MD         Social History     Socioeconomic History    Marital status:    Tobacco Use    Smoking status: Never Smoker    Smokeless tobacco: Never Used   Substance and Sexual Activity    Alcohol use: Yes     Comment: very rare    Drug use: No     Family History   Problem Relation Age of Onset    Stroke Mother     Hypertension Mother     Heart disease Father     Coronary artery disease Father     Diabetes Father     Heart disease Paternal Aunt     Stroke Maternal Grandmother        Review of Systems   Constitutional: Positive for appetite change. Negative for chills, diaphoresis and fever.   HENT: Negative for congestion, hearing loss, mouth sores, postnasal drip and sore throat.    Eyes: Negative for pain and visual disturbance.   Respiratory: Negative for cough, chest tightness, shortness of breath and wheezing.    Cardiovascular: Negative for chest pain.   Gastrointestinal: Negative for abdominal pain, anal bleeding, blood in stool, constipation, diarrhea, nausea and vomiting.   Genitourinary: Negative for dysuria and hematuria.   Musculoskeletal: Negative for back pain, neck pain and neck stiffness.   Skin: Negative for rash.   Neurological: Negative for dizziness and weakness.       Objective:     /62   Pulse 68   Temp 98.4 °F (36.9 °C)   Ht 5' 10" (1.778 m)   Wt 121.1 kg (267 lb)   BMI 38.31 kg/m²     Physical Exam  Constitutional:       Appearance: He is well-developed and well-nourished. He is not diaphoretic.   HENT:      Head: Normocephalic and atraumatic.      Right Ear: External ear normal.      Left Ear: External ear normal.      Nose: Nose normal.      Mouth/Throat:      Mouth: Oropharynx is clear and moist.      Pharynx: No oropharyngeal exudate.   Eyes:      " General: No scleral icterus.        Right eye: No discharge.         Left eye: No discharge.      Extraocular Movements: EOM normal.      Conjunctiva/sclera: Conjunctivae normal.      Pupils: Pupils are equal, round, and reactive to light.   Neck:      Thyroid: No thyromegaly.      Vascular: No JVD.   Cardiovascular:      Rate and Rhythm: Normal rate and regular rhythm.      Pulses: Intact distal pulses.      Heart sounds: Normal heart sounds. No murmur heard.  No friction rub. No gallop.    Pulmonary:      Effort: Pulmonary effort is normal. No respiratory distress.      Breath sounds: Normal breath sounds. No wheezing or rales.   Chest:      Chest wall: No tenderness.   Abdominal:      General: Bowel sounds are normal. There is no distension.      Palpations: Abdomen is soft. There is no mass.      Tenderness: There is no abdominal tenderness. There is no guarding or rebound.   Musculoskeletal:         General: No tenderness or edema. Normal range of motion.      Cervical back: Normal range of motion and neck supple.   Lymphadenopathy:      Cervical: No cervical adenopathy.   Skin:     General: Skin is warm and dry.   Neurological:      Mental Status: He is alert and oriented to person, place, and time.      Cranial Nerves: No cranial nerve deficit.      Coordination: Coordination normal.   Psychiatric:         Mood and Affect: Mood and affect normal.       Assessment:     1. Type 2 diabetes mellitus with both eyes affected by proliferative retinopathy without macular edema, with long-term current use of insulin    2. Hyperlipidemia associated with type 2 diabetes mellitus    3. CKD stage 3 due to type 2 diabetes mellitus    4. Controlled type 2 diabetes mellitus with stage 3 chronic kidney disease, with long-term current use of insulin        Plan:     Problem List Items Addressed This Visit     CKD stage 3 due to type 2 diabetes mellitus    Relevant Medications    alogliptin 6.25 mg Tab    Hyperlipidemia  associated with type 2 diabetes mellitus    Relevant Medications    alogliptin 6.25 mg Tab    Type 2 diabetes mellitus with both eyes affected by proliferative retinopathy without macular edema, with long-term current use of insulin    Relevant Medications    alogliptin 6.25 mg Tab      Other Visit Diagnoses     Controlled type 2 diabetes mellitus with stage 3 chronic kidney disease, with long-term current use of insulin        Relevant Medications    alogliptin 6.25 mg Tab        No follow-ups on file.      I am having Alfredo Brennan maintain his famotidine, LANCETS & BLOOD GLUCOSE STRIPS MISC, docusate sodium, nitroGLYCERIN, BREEZE 2 TEST STRIPS, (pen needle, diabetic), guaiFENesin, sildenafiL, rivaroxaban, levocetirizine, amiodarone, albuterol, calcitRIOL, levothyroxine, metOLazone, losartan, phenazopyridine HCl (AZO ORAL), umeclidinium-vilanteroL, PRADAXA, metoprolol succinate, atorvastatin, potassium chloride SA, allopurinoL, and alogliptin. We will continue to administer LIDOcaine HCL 10 mg/ml (1%).    Alfredo was seen today for follow-up.    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  No orders of the defined types were placed in this encounter.

## 2022-02-09 DIAGNOSIS — N18.30 CKD STAGE 3 DUE TO TYPE 2 DIABETES MELLITUS: ICD-10-CM

## 2022-02-09 DIAGNOSIS — E11.22 CKD STAGE 3 DUE TO TYPE 2 DIABETES MELLITUS: ICD-10-CM

## 2022-03-09 LAB
CHOLEST SERPL-MSCNC: 115 MG/DL (ref 0–240)
HDLC SERPL-MCNC: 32 MG/DL
LDL CHOLESTEROL DIRECT: 52 MG/DL
LDLC SERPL CALC-MCNC: 45 MG/DL (ref 0–100)
TRIGL SERPL-MCNC: 191 MG/DL (ref 0–200)

## 2022-04-05 DIAGNOSIS — Z71.89 COMPLEX CARE COORDINATION: ICD-10-CM

## 2022-04-26 ENCOUNTER — PATIENT MESSAGE (OUTPATIENT)
Dept: ADMINISTRATIVE | Facility: HOSPITAL | Age: 72
End: 2022-04-26
Payer: COMMERCIAL

## 2022-05-26 ENCOUNTER — PATIENT MESSAGE (OUTPATIENT)
Dept: FAMILY MEDICINE | Facility: CLINIC | Age: 72
End: 2022-05-26
Payer: COMMERCIAL

## 2022-06-02 ENCOUNTER — PATIENT OUTREACH (OUTPATIENT)
Dept: ADMINISTRATIVE | Facility: HOSPITAL | Age: 72
End: 2022-06-02
Payer: COMMERCIAL

## 2022-06-02 NOTE — PROGRESS NOTES
HA1c Report: HA1c & Lipid panel from 01/12/2022 entered. Next HA1c scheduled at [place] 07/12/22.  is not updating at this time IS ticket sent.

## 2022-07-08 ENCOUNTER — PATIENT MESSAGE (OUTPATIENT)
Dept: FAMILY MEDICINE | Facility: CLINIC | Age: 72
End: 2022-07-08
Payer: COMMERCIAL

## 2022-07-11 ENCOUNTER — PATIENT MESSAGE (OUTPATIENT)
Dept: ADMINISTRATIVE | Facility: HOSPITAL | Age: 72
End: 2022-07-11
Payer: COMMERCIAL

## 2022-07-18 ENCOUNTER — TELEPHONE (OUTPATIENT)
Dept: FAMILY MEDICINE | Facility: CLINIC | Age: 72
End: 2022-07-18
Payer: COMMERCIAL

## 2022-07-20 ENCOUNTER — OFFICE VISIT (OUTPATIENT)
Dept: FAMILY MEDICINE | Facility: CLINIC | Age: 72
End: 2022-07-20
Payer: COMMERCIAL

## 2022-07-20 ENCOUNTER — LAB VISIT (OUTPATIENT)
Dept: LAB | Facility: HOSPITAL | Age: 72
End: 2022-07-20
Attending: FAMILY MEDICINE
Payer: COMMERCIAL

## 2022-07-20 ENCOUNTER — TELEPHONE (OUTPATIENT)
Dept: FAMILY MEDICINE | Facility: CLINIC | Age: 72
End: 2022-07-20
Payer: COMMERCIAL

## 2022-07-20 VITALS
OXYGEN SATURATION: 96 % | BODY MASS INDEX: 36.65 KG/M2 | WEIGHT: 256 LBS | TEMPERATURE: 98 F | HEIGHT: 70 IN | SYSTOLIC BLOOD PRESSURE: 110 MMHG | DIASTOLIC BLOOD PRESSURE: 58 MMHG | HEART RATE: 66 BPM

## 2022-07-20 DIAGNOSIS — N18.4 STAGE 4 CHRONIC KIDNEY DISEASE: ICD-10-CM

## 2022-07-20 DIAGNOSIS — E66.01 MORBID OBESITY WITH BMI OF 40.0-44.9, ADULT: ICD-10-CM

## 2022-07-20 DIAGNOSIS — Z79.4 TYPE 2 DIABETES MELLITUS WITH BOTH EYES AFFECTED BY PROLIFERATIVE RETINOPATHY WITHOUT MACULAR EDEMA, WITH LONG-TERM CURRENT USE OF INSULIN: ICD-10-CM

## 2022-07-20 DIAGNOSIS — E11.3593 TYPE 2 DIABETES MELLITUS WITH BOTH EYES AFFECTED BY PROLIFERATIVE RETINOPATHY WITHOUT MACULAR EDEMA, WITH LONG-TERM CURRENT USE OF INSULIN: ICD-10-CM

## 2022-07-20 DIAGNOSIS — I25.119 ATHEROSCLEROSIS OF NATIVE CORONARY ARTERY OF NATIVE HEART WITH ANGINA PECTORIS: ICD-10-CM

## 2022-07-20 DIAGNOSIS — J44.9 CHRONIC OBSTRUCTIVE PULMONARY DISEASE, UNSPECIFIED COPD TYPE: ICD-10-CM

## 2022-07-20 DIAGNOSIS — R41.3 OTHER AMNESIA: Primary | ICD-10-CM

## 2022-07-20 DIAGNOSIS — E83.52 HYPERCALCEMIA: ICD-10-CM

## 2022-07-20 DIAGNOSIS — F32.1 CURRENT MODERATE EPISODE OF MAJOR DEPRESSIVE DISORDER WITHOUT PRIOR EPISODE: ICD-10-CM

## 2022-07-20 LAB
ESTIMATED AVG GLUCOSE: 214 MG/DL (ref 68–131)
HBA1C MFR BLD: 9.1 % (ref 4–5.6)

## 2022-07-20 PROCEDURE — 3046F PR MOST RECENT HEMOGLOBIN A1C LEVEL > 9.0%: ICD-10-PCS | Mod: CPTII,S$GLB,, | Performed by: FAMILY MEDICINE

## 2022-07-20 PROCEDURE — 36415 COLL VENOUS BLD VENIPUNCTURE: CPT | Mod: PO | Performed by: FAMILY MEDICINE

## 2022-07-20 PROCEDURE — 3066F NEPHROPATHY DOC TX: CPT | Mod: CPTII,S$GLB,, | Performed by: FAMILY MEDICINE

## 2022-07-20 PROCEDURE — 1126F AMNT PAIN NOTED NONE PRSNT: CPT | Mod: CPTII,S$GLB,, | Performed by: FAMILY MEDICINE

## 2022-07-20 PROCEDURE — 3061F NEG MICROALBUMINURIA REV: CPT | Mod: CPTII,S$GLB,, | Performed by: FAMILY MEDICINE

## 2022-07-20 PROCEDURE — 3066F PR DOCUMENTATION OF TREATMENT FOR NEPHROPATHY: ICD-10-PCS | Mod: CPTII,S$GLB,, | Performed by: FAMILY MEDICINE

## 2022-07-20 PROCEDURE — 1101F PR PT FALLS ASSESS DOC 0-1 FALLS W/OUT INJ PAST YR: ICD-10-PCS | Mod: CPTII,S$GLB,, | Performed by: FAMILY MEDICINE

## 2022-07-20 PROCEDURE — 3288F FALL RISK ASSESSMENT DOCD: CPT | Mod: CPTII,S$GLB,, | Performed by: FAMILY MEDICINE

## 2022-07-20 PROCEDURE — 3078F PR MOST RECENT DIASTOLIC BLOOD PRESSURE < 80 MM HG: ICD-10-PCS | Mod: CPTII,S$GLB,, | Performed by: FAMILY MEDICINE

## 2022-07-20 PROCEDURE — 3061F PR NEG MICROALBUMINURIA RESULT DOCUMENTED/REVIEW: ICD-10-PCS | Mod: CPTII,S$GLB,, | Performed by: FAMILY MEDICINE

## 2022-07-20 PROCEDURE — 1101F PT FALLS ASSESS-DOCD LE1/YR: CPT | Mod: CPTII,S$GLB,, | Performed by: FAMILY MEDICINE

## 2022-07-20 PROCEDURE — 99999 PR PBB SHADOW E&M-EST. PATIENT-LVL V: ICD-10-PCS | Mod: PBBFAC,,, | Performed by: FAMILY MEDICINE

## 2022-07-20 PROCEDURE — 99214 OFFICE O/P EST MOD 30 MIN: CPT | Mod: S$GLB,,, | Performed by: FAMILY MEDICINE

## 2022-07-20 PROCEDURE — 3008F BODY MASS INDEX DOCD: CPT | Mod: CPTII,S$GLB,, | Performed by: FAMILY MEDICINE

## 2022-07-20 PROCEDURE — 99999 PR PBB SHADOW E&M-EST. PATIENT-LVL V: CPT | Mod: PBBFAC,,, | Performed by: FAMILY MEDICINE

## 2022-07-20 PROCEDURE — 3078F DIAST BP <80 MM HG: CPT | Mod: CPTII,S$GLB,, | Performed by: FAMILY MEDICINE

## 2022-07-20 PROCEDURE — 1159F MED LIST DOCD IN RCRD: CPT | Mod: CPTII,S$GLB,, | Performed by: FAMILY MEDICINE

## 2022-07-20 PROCEDURE — 3288F PR FALLS RISK ASSESSMENT DOCUMENTED: ICD-10-PCS | Mod: CPTII,S$GLB,, | Performed by: FAMILY MEDICINE

## 2022-07-20 PROCEDURE — 1126F PR PAIN SEVERITY QUANTIFIED, NO PAIN PRESENT: ICD-10-PCS | Mod: CPTII,S$GLB,, | Performed by: FAMILY MEDICINE

## 2022-07-20 PROCEDURE — 3074F SYST BP LT 130 MM HG: CPT | Mod: CPTII,S$GLB,, | Performed by: FAMILY MEDICINE

## 2022-07-20 PROCEDURE — 3046F HEMOGLOBIN A1C LEVEL >9.0%: CPT | Mod: CPTII,S$GLB,, | Performed by: FAMILY MEDICINE

## 2022-07-20 PROCEDURE — 3074F PR MOST RECENT SYSTOLIC BLOOD PRESSURE < 130 MM HG: ICD-10-PCS | Mod: CPTII,S$GLB,, | Performed by: FAMILY MEDICINE

## 2022-07-20 PROCEDURE — 99214 PR OFFICE/OUTPT VISIT, EST, LEVL IV, 30-39 MIN: ICD-10-PCS | Mod: S$GLB,,, | Performed by: FAMILY MEDICINE

## 2022-07-20 PROCEDURE — 1159F PR MEDICATION LIST DOCUMENTED IN MEDICAL RECORD: ICD-10-PCS | Mod: CPTII,S$GLB,, | Performed by: FAMILY MEDICINE

## 2022-07-20 PROCEDURE — 83036 HEMOGLOBIN GLYCOSYLATED A1C: CPT | Performed by: FAMILY MEDICINE

## 2022-07-20 PROCEDURE — 3008F PR BODY MASS INDEX (BMI) DOCUMENTED: ICD-10-PCS | Mod: CPTII,S$GLB,, | Performed by: FAMILY MEDICINE

## 2022-07-20 RX ORDER — INSULIN DETEMIR 100 [IU]/ML
48 INJECTION, SOLUTION SUBCUTANEOUS 2 TIMES DAILY
COMMUNITY
Start: 2022-07-05

## 2022-07-20 RX ORDER — TAMSULOSIN HYDROCHLORIDE 0.4 MG/1
CAPSULE ORAL
COMMUNITY
Start: 2022-06-17

## 2022-07-20 RX ORDER — SERTRALINE HYDROCHLORIDE 100 MG/1
50 TABLET, FILM COATED ORAL DAILY
COMMUNITY
End: 2022-07-21

## 2022-07-20 RX ORDER — LEVOTHYROXINE SODIUM 175 UG/1
1 TABLET ORAL DAILY
COMMUNITY
Start: 2022-04-26

## 2022-07-20 RX ORDER — FUROSEMIDE 40 MG/1
TABLET ORAL
COMMUNITY
Start: 2022-06-16

## 2022-07-20 RX ORDER — FOLIC ACID 1 MG/1
TABLET ORAL
COMMUNITY
Start: 2022-06-15

## 2022-07-20 RX ORDER — POTASSIUM CHLORIDE 750 MG/1
TABLET, EXTENDED RELEASE ORAL
COMMUNITY
Start: 2022-03-31 | End: 2022-07-20 | Stop reason: SDUPTHER

## 2022-07-20 RX ORDER — POTASSIUM CHLORIDE 750 MG/1
20 CAPSULE, EXTENDED RELEASE ORAL 3 TIMES DAILY
Qty: 540 CAPSULE | Refills: 3 | Status: SHIPPED | OUTPATIENT
Start: 2022-07-20

## 2022-07-20 NOTE — PROGRESS NOTES
Subjective:      Patient ID: Alfredo Brennan is a 72 y.o. male.    Chief Complaint: Memory Loss    Problem List Items Addressed This Visit     Morbid obesity with BMI of 40.0-44.9, adult (Chronic)    Overview     The patient presents with obesity.  Denies bulimia, amenorrhea, cold intolerance, edema, hip pain, hirsutism, knee pain, polydipsia, polyuria, thirst and weakness.  The patient does not perform regular exercise.  Previous treatments for obesity :self-directed dieting with success.  The patient and I discussed the importance of exercise.    Wt Readings from Last 4 Encounters:   07/20/22 116.1 kg (256 lb)   01/19/22 120.7 kg (266 lb 1.6 oz)   01/14/22 121.1 kg (267 lb)   01/27/21 123.4 kg (272 lb)                  Atherosclerosis of native coronary artery of native heart with angina pectoris    Overview     The patient presents with coronary artery disease.  Denies chest pain, shortness of breath, left arm or neck pain, diaphoresis, nausea, vomiting, palpitations, paroxysmal nocturnal dyspnea, orthopnea, claudication or decreased exercise tolerance.  The patient reports excellent compliance.  Current treatment has included medications that are listed in medication list.  The cannot identify any exacerbating factors.               Stage 4 chronic kidney disease    Overview     The patient has an Estimated Glumerular Filtration Rate (EGFR) below:   CrCl cannot be calculated (Patient's most recent lab result is older than the maximum 7 days allowed.).  It has improved a little with the GFR.  eGFR if non    Date Value Ref Range Status   12/30/2021 35.0 (L) 60.0 mL/min/1.73 m2 Final         Lab Results   Component Value Date    CREATININE 1.87 (H) 12/30/2021    BUN 27 (H) 12/30/2021     12/30/2021    K 3.7 12/30/2021    CL 97 (L) 12/30/2021    CO2 30 12/30/2021       The patient's chronic kidney disease was monitored, evaluated, addressed and/or treated.               Type 2 diabetes mellitus  with both eyes affected by proliferative retinopathy without macular edema, with long-term current use of insulin    Overview     The patient presents with diabetes.  The patient denies polyuria, polydipsia, polyphagia, hypoglycemia and paresthesias.  The patient's glucose control has been good.  Home glucose averages are routinely checked.  The patient is without retinopathy currently.  The patient has no history of neuropathy.  The patient currently complains of no podiatric problems.  The patient has excellent compliance.  He had an a1c of 9.9 on 1/12/2022.  He is only taking lantus.  He was asked by his endocrinologist to take novolog and levimir.  He has not started either.             Relevant Medications    insulin detemir U-100 (LEVEMIR FLEXTOUCH U-100 INSULN) 100 unit/mL (3 mL) InPn pen    Other Relevant Orders    Hemoglobin A1C (Completed)    Microalbumin/Creatinine Ratio, Urine (Completed)      Other Visit Diagnoses     Other amnesia    -  Primary    Relevant Orders    CT Head Without Contrast    Current moderate episode of major depressive disorder without prior episode        Hypercalcemia        Chronic obstructive pulmonary disease, unspecified COPD type            Memory issues: He is here for evaluation and treatment of cognitive problems. He is accompanied by patient and partner. Primary caregiver is patient and spouse. The family and the patient identify problems with changes in short term memory. Family and patient report problems with thinking through simple things at times. Family and patient are concerned about  His thinking, however, they are not concerned about driving, cooking and inability to maintain adequate nutrition. Medication administration: patient self medicates    Functional Assessment:   Activities of Daily Living (ADLs):    He is independent in the following: ambulation, bathing and hygiene, feeding, continence, grooming, toileting and dressing  Requires assistance with the  following: nothing  Instrumental Activities of Daily Living (IADLs):    He is independent in the following: all aspects at this time  Requires assistance with the following: nothing      He did find that he had a high calcium.  They had decreased his rocalcitrol but Alfredo did not know that he should do that.    Also, he has been chewing his potassium pills.  He uses milk and he has been using tums.  His endocrine doc called his attention to this.  On Monday, Dr. Gaytan reviewed his records and asked about the change in the rocalcitrol.  Dr. Gaytan stopped the rocalcitrol.  They also have [facility] get him some radioactive iodine for his thyroid issue.  Alfredo stopped calcium Monday, yesterday he was better.  To him, the symptoms manifested in that he could not think of some things clearly like how to hook up his guitar equipment.  They will be checking the calcium in 2 weeks which is scheduled.   [facility] and she has given sertaline but he has never taken it. He agrees that he will take it now.        Lab Results   Component Value Date    LABA1C 6.4 (H) 01/03/2018    HGBA1C 9.1 (H) 07/20/2022         The patient's Health Maintenance was reviewed and the following appears to be due:   Health Maintenance Due   Topic Date Due    Shingles Vaccine (1 of 2) 02/29/2016    COVID-19 Vaccine (3 - Booster) 07/08/2021    Foot Exam  01/27/2022    Colorectal Cancer Screening  02/01/2022    Eye Exam  07/09/2022       Past Medical History:  Past Medical History:   Diagnosis Date    Atrial fibrillation     CAD (coronary artery disease)     CKD stage 3 due to type 2 diabetes mellitus 1/4/2018    Lab Results Component Value Date  CREATININE 2.04 (H) 01/03/2018  BUN 22 01/03/2018   01/03/2018  K 4.2 01/03/2018   01/03/2018  CO2 30 01/03/2018  BMP Lab Results Component Value Date   01/03/2018  K 4.2 01/03/2018   01/03/2018  CO2 30 01/03/2018   BUN 22 01/03/2018  CREATININE 2.04 (H) 01/03/2018  CALCIUM 8.7 01/03/2018  ANIONGAP 8 01/27/2015  ESTGFRAFRICA 38 (L) 01/03/201    Diabetes mellitus     Diabetes mellitus type II     Elevated PSA     History of colon polyps 1/8/2018    He has had colon polyps in the past and he is screened serially for this.     Hyperlipidemia associated with type 2 diabetes mellitus 7/10/2012    Hyperlipidemia LDL goal < 70     Hypertension     Hypertension goal BP (blood pressure) < 130/80     Mass of adrenal gland     Mild acid reflux     Prostate cancer     Skin cancer     Skin cancer (melanoma)      Past Surgical History:   Procedure Laterality Date    CARDIAC CATHETERIZATION  02/2015    CARDIOVERSION      CHOLECYSTECTOMY      COLONOSCOPY N/A 02/01/2019    Procedure: COLONOSCOPY-obtain cardiology clearance;  Surgeon: Rashid Engel III, MD;  Location[facility];  Service: Endoscopy;  Laterality: N/A;    CORONARY ARTERY BYPASS GRAFT      EYE SURGERY      mohs      PROSTATE SURGERY      radiation prostate      SKIN CANCER EXCISION      TONSILLECTOMY      TOTAL THYROIDECTOMY      removed due to cancer.    TRANSURETHRAL RESECTION OF PROSTATE       Review of patient's allergies indicates:   Allergen Reactions    Codeine      Other reaction(s): Hallucinations    Fish oil Rash and Itching     Current Outpatient Medications on File Prior to Visit   Medication Sig Dispense Refill    albuterol (PROVENTIL/VENTOLIN HFA) 90 mcg/actuation inhaler Inhale 2 puffs into the lungs every 6 (six) hours as needed for Wheezing. 18 g 1    allopurinoL (ZYLOPRIM) 100 MG tablet       alogliptin 6.25 mg Tab Take 6.25 mg by mouth daily as needed.      atorvastatin (LIPITOR) 80 MG tablet Take 1 tablet (80 mg total) by mouth once daily. 90 tablet 1    BREEZE 2 TEST STRIPS Strp USE ONE STRIP TO CHECK GLUCOSE 3 TO 4 TIMES DAILY AS NEEDED 100 strip 11    docusate sodium (COLACE) 100 MG capsule 3 (three) times daily as  "needed. Every day      famotidine (PEPCID) 20 MG tablet 2 (two) times daily. Every day      folic acid (FOLVITE) 1 MG tablet       furosemide (LASIX) 40 MG tablet       guaiFENesin (MUCINEX) 600 mg 12 hr tablet Take 600 mg by mouth.      insulin aspart protamine-insulin aspart (NOVOLOG 70/30) 100 unit/mL (70-30) InPn pen Inject 5 Units into the skin Daily.      insulin detemir U-100 (LEVEMIR FLEXTOUCH U-100 INSULN) 100 unit/mL (3 mL) InPn pen 48 Units 2 (two) times daily.      LANCETS & BLOOD GLUCOSE STRIPS MISC qid      metOLazone (ZAROXOLYN) 5 MG tablet Take 1 tablet (5 mg total) by mouth daily as needed. 90 tablet 1    metoprolol succinate (TOPROL-XL) 25 MG 24 hr tablet Take 25 mg by mouth.      pen needle, diabetic (PEN NEEDLE) 31 gauge x 5/16" Ndle USE ONE SYRINGE EVERY  each 3    PRADAXA 75 mg Cap 2 (two) times a day.      sildenafil (VIAGRA) 100 MG tablet Take 50 mg by mouth.      SYNTHROID 175 mcg tablet Take 1 tablet by mouth once daily at 6am.      tamsulosin (FLOMAX) 0.4 mg Cap       losartan (COZAAR) 25 MG tablet Take 25 mg by mouth.      nitroGLYCERIN (NITROSTAT) 0.4 MG SL tablet Place 0.4 mg under the tongue every 5 (five) minutes as needed for Chest pain.      phenazopyridine HCl (AZO ORAL) Take by mouth as needed.      rivaroxaban (XARELTO) 15 mg Tab Take 15 mg by mouth once daily.        Current Facility-Administered Medications on File Prior to Visit   Medication Dose Route Frequency Provider Last Rate Last Admin    lidocaine HCL 10 mg/ml (1%) injection 1 mL  1 mL Intramuscular Once Henri Singh MD         Social History     Socioeconomic History    Marital status:    Tobacco Use    Smoking status: Never Smoker    Smokeless tobacco: Never Used   Substance and Sexual Activity    Alcohol use: Yes     Comment: very rare    Drug use: No    Sexual activity: Not Currently     Partners: Female     Comment: once monthly     Family History   Problem Relation Age " "of Onset    Stroke Mother     Hypertension Mother     Heart disease Father     Coronary artery disease Father     Diabetes Father     Heart disease Paternal Aunt     Stroke Maternal Grandmother        Review of Systems   Psychiatric/Behavioral: Positive for decreased concentration.       Objective:   BP (!) 110/58   Pulse 66   Temp 98.3 °F (36.8 °C)   Ht 5' 10" (1.778 m)   Wt 116.1 kg (256 lb)   SpO2 96%   BMI 36.73 kg/m²     Physical Exam  Constitutional:       Appearance: He is well-developed.   Cardiovascular:      Rate and Rhythm: Normal rate and regular rhythm.      Heart sounds: Normal heart sounds. No murmur heard.    No friction rub. No gallop.   Pulmonary:      Effort: Pulmonary effort is normal. No respiratory distress.      Breath sounds: Normal breath sounds. No wheezing or rales.         ORIENTATION 5    5   REGISTRATION 3   ATTENTION & CALCULATION 5   RECALL 3   LANGUAGE 2    1    3    1    1    1   TOTAL OUT OF 30 30       Assessment:     1. Other amnesia    2. Type 2 diabetes mellitus with both eyes affected by proliferative retinopathy without macular edema, with long-term current use of insulin    3. Current moderate episode of major depressive disorder without prior episode    4. Hypercalcemia    5. Morbid obesity with BMI of 40.0-44.9, adult    6. Atherosclerosis of native coronary artery of native heart with angina pectoris    7. Chronic obstructive pulmonary disease, unspecified COPD type    8. Stage 4 chronic kidney disease      Plan:   I have discontinued Alfredo Brennan's potassium chloride SA. I am also having him start on potassium chloride. Additionally, I am having him maintain his famotidine, LANCETS & BLOOD GLUCOSE STRIPS MISC, docusate sodium, nitroGLYCERIN, BREEZE 2 TEST STRIPS, (pen needle, diabetic), guaiFENesin, sildenafiL, rivaroxaban, albuterol, metOLazone, losartan, phenazopyridine HCl (AZO ORAL), PRADAXA, metoprolol succinate, atorvastatin, allopurinoL, " alogliptin, insulin aspart protamine-insulin aspart, SYNTHROID, LEVEMIR FLEXTOUCH U-100 INSULN, folic acid, furosemide, and tamsulosin. We will continue to administer LIDOcaine HCL 10 mg/ml (1%).  Problem List Items Addressed This Visit     Morbid obesity with BMI of 40.0-44.9, adult (Chronic)    Atherosclerosis of native coronary artery of native heart with angina pectoris    Stage 4 chronic kidney disease    Type 2 diabetes mellitus with both eyes affected by proliferative retinopathy without macular edema, with long-term current use of insulin    Relevant Medications    insulin detemir U-100 (LEVEMIR FLEXTOUCH U-100 INSULN) 100 unit/mL (3 mL) InPn pen    Other Relevant Orders    Hemoglobin A1C (Completed)    Microalbumin/Creatinine Ratio, Urine (Completed)      Other Visit Diagnoses     Other amnesia    -  Primary    Relevant Orders    CT Head Without Contrast    Current moderate episode of major depressive disorder without prior episode        Hypercalcemia        Chronic obstructive pulmonary disease, unspecified COPD type            Follow up for [facility] imaging ordered today, Draw labs now.    Alfredo was seen today for memory loss.    Diagnoses and all orders for this visit:    Other amnesia  -     CT Head Without Contrast; Future    Type 2 diabetes mellitus with both eyes affected by proliferative retinopathy without macular edema, with long-term current use of insulin  -     Hemoglobin A1C; Future  -     Microalbumin/Creatinine Ratio, Urine; Future    Current moderate episode of major depressive disorder without prior episode  Cont meds.  Hypercalcemia  No tx for now  Morbid obesity with BMI of 40.0-44.9, adult  Continue to try to lose weight.  Atherosclerosis of native coronary artery of native heart with angina pectoris  F/u with specialist  Chronic obstructive pulmonary disease, unspecified COPD type  No tx needed now  Stage 4 chronic kidney disease  Continue to track and f/u with specailsit  Other  orders  -     potassium chloride (MICRO-K) 10 MEQ CpSR; Take 2 capsules (20 mEq total) by mouth 3 (three) times daily.      Medications Ordered This Encounter   Medications    potassium chloride (MICRO-K) 10 MEQ CpSR     Sig: Take 2 capsules (20 mEq total) by mouth 3 (three) times daily.     Dispense:  540 capsule     Refill:  3     The patient was instructed to stop the following meds:  Medications Discontinued During This Encounter   Medication Reason    calcitRIOL (ROCALTROL) 0.25 MCG Cap Patient no longer taking    ciprofloxacin-dexamethasone 0.3-0.1% (CIPRODEX) 0.3-0.1 % DrpS Patient no longer taking    levocetirizine (XYZAL) 5 MG tablet Patient no longer taking    levothyroxine (SYNTHROID) 88 MCG tablet Patient no longer taking    umeclidinium-vilanterol (ANORO ELLIPTA) 62.5-25 mcg/actuation DsDv Patient no longer taking    amiodarone (PACERONE) 100 MG Tab Patient no longer taking    potassium chloride (KLOR-CON) 10 MEQ TbSR Duplicate Order    potassium chloride SA (K-DUR,KLOR-CON) 20 MEQ tablet      Orders Placed This Encounter   Procedures    CT Head Without Contrast     Standing Status:   Future     Standing Expiration Date:   7/21/2023     Order Specific Question:   Is the patient allergic to iodine or contrast?     Answer:   No     Order Specific Question:   Is the patient on ANY Metformin medication such as Glucophage/Glucovance ?     Answer:   No     Order Specific Question:   Does the patient have any of the following risk factors?     Answer:   Renal Insufficiency or Failure    Hemoglobin A1C     Standing Status:   Future     Number of Occurrences:   1     Standing Expiration Date:   7/20/2023    Microalbumin/Creatinine Ratio, Urine     May change to clinic collect if the patient is in the clinic at the time.     Standing Status:   Future     Number of Occurrences:   1     Standing Expiration Date:   7/20/2023     Order Specific Question:   Specimen Source     Answer:   Urine  "      Medication List with Changes/Refills   New Medications    POTASSIUM CHLORIDE (MICRO-K) 10 MEQ CPSR    Take 2 capsules (20 mEq total) by mouth 3 (three) times daily.   Current Medications    ALBUTEROL (PROVENTIL/VENTOLIN HFA) 90 MCG/ACTUATION INHALER    Inhale 2 puffs into the lungs every 6 (six) hours as needed for Wheezing.    ALLOPURINOL (ZYLOPRIM) 100 MG TABLET        ALOGLIPTIN 6.25 MG TAB    Take 6.25 mg by mouth daily as needed.    ATORVASTATIN (LIPITOR) 80 MG TABLET    Take 1 tablet (80 mg total) by mouth once daily.    BREEZE 2 TEST STRIPS STRP    USE ONE STRIP TO CHECK GLUCOSE 3 TO 4 TIMES DAILY AS NEEDED    DOCUSATE SODIUM (COLACE) 100 MG CAPSULE    3 (three) times daily as needed. Every day    FAMOTIDINE (PEPCID) 20 MG TABLET    2 (two) times daily. Every day    FOLIC ACID (FOLVITE) 1 MG TABLET        FUROSEMIDE (LASIX) 40 MG TABLET        GUAIFENESIN (MUCINEX) 600 MG 12 HR TABLET    Take 600 mg by mouth.    INSULIN ASPART PROTAMINE-INSULIN ASPART (NOVOLOG 70/30) 100 UNIT/ML (70-30) INPN PEN    Inject 5 Units into the skin Daily.    INSULIN DETEMIR U-100 (LEVEMIR FLEXTOUCH U-100 INSULN) 100 UNIT/ML (3 ML) INPN PEN    48 Units 2 (two) times daily.    LANCETS & BLOOD GLUCOSE STRIPS MISC    qid    LOSARTAN (COZAAR) 25 MG TABLET    Take 25 mg by mouth.    METOLAZONE (ZAROXOLYN) 5 MG TABLET    Take 1 tablet (5 mg total) by mouth daily as needed.    METOPROLOL SUCCINATE (TOPROL-XL) 25 MG 24 HR TABLET    Take 25 mg by mouth.    NITROGLYCERIN (NITROSTAT) 0.4 MG SL TABLET    Place 0.4 mg under the tongue every 5 (five) minutes as needed for Chest pain.    PEN NEEDLE, DIABETIC (PEN NEEDLE) 31 GAUGE X 5/16" NDLE    USE ONE SYRINGE EVERY DAY    PHENAZOPYRIDINE HCL (AZO ORAL)    Take by mouth as needed.    PRADAXA 75 MG CAP    2 (two) times a day.    RIVAROXABAN (XARELTO) 15 MG TAB    Take 15 mg by mouth once daily.     SILDENAFIL (VIAGRA) 100 MG TABLET    Take 50 mg by mouth.    SYNTHROID 175 MCG TABLET    Take " 1 tablet by mouth once daily at 6am.    TAMSULOSIN (FLOMAX) 0.4 MG CAP       Changed and/or Refilled Medications    Modified Medication Previous Medication    SERTRALINE (ZOLOFT) 50 MG TABLET sertraline (ZOLOFT) 100 MG tablet       Take 1 tablet (50 mg total) by mouth once daily.    Take 50 mg by mouth once daily.   Discontinued Medications    AMIODARONE (PACERONE) 100 MG TAB    Take 100 mg by mouth.    CALCITRIOL (ROCALTROL) 0.25 MCG CAP    Use on daily during the week and two daily on weekends.    CIPROFLOXACIN-DEXAMETHASONE 0.3-0.1% (CIPRODEX) 0.3-0.1 % DRPS    Place 4 drops into the right ear 2 (two) times daily.    LEVOCETIRIZINE (XYZAL) 5 MG TABLET    Take 1 tablet (5 mg total) by mouth every evening. for 10 days    LEVOTHYROXINE (SYNTHROID) 88 MCG TABLET    Take 1 tablet (88 mcg total) by mouth once daily.    POTASSIUM CHLORIDE (KLOR-CON) 10 MEQ TBSR        POTASSIUM CHLORIDE SA (K-DUR,KLOR-CON) 20 MEQ TABLET    Take 1 tablet (20 mEq total) by mouth 3 (three) times daily.    UMECLIDINIUM-VILANTEROL (ANORO ELLIPTA) 62.5-25 MCG/ACTUATION DSDV    Inhale 1 puff into the lungs once daily.      Medication List with Changes/Refills   New Medications    POTASSIUM CHLORIDE (MICRO-K) 10 MEQ CPSR    Take 2 capsules (20 mEq total) by mouth 3 (three) times daily.       Start Date: 7/20/2022 End Date: --   Current Medications    ALBUTEROL (PROVENTIL/VENTOLIN HFA) 90 MCG/ACTUATION INHALER    Inhale 2 puffs into the lungs every 6 (six) hours as needed for Wheezing.       Start Date: 1/28/2019 End Date: --    ALLOPURINOL (ZYLOPRIM) 100 MG TABLET           Start Date: 10/8/2021 End Date: --    ALOGLIPTIN 6.25 MG TAB    Take 6.25 mg by mouth daily as needed.       Start Date: --        End Date: --    ATORVASTATIN (LIPITOR) 80 MG TABLET    Take 1 tablet (80 mg total) by mouth once daily.       Start Date: 1/27/2021 End Date: --    BREEZE 2 TEST STRIPS STRP    USE ONE STRIP TO CHECK GLUCOSE 3 TO 4 TIMES DAILY AS NEEDED        "Start Date: 6/20/2016 End Date: --    DOCUSATE SODIUM (COLACE) 100 MG CAPSULE    3 (three) times daily as needed. Every day       Start Date: 11/1/2011 End Date: --    FAMOTIDINE (PEPCID) 20 MG TABLET    2 (two) times daily. Every day       Start Date: 11/1/2011 End Date: --    FOLIC ACID (FOLVITE) 1 MG TABLET           Start Date: 6/15/2022 End Date: --    FUROSEMIDE (LASIX) 40 MG TABLET           Start Date: 6/16/2022 End Date: --    GUAIFENESIN (MUCINEX) 600 MG 12 HR TABLET    Take 600 mg by mouth.       Start Date: --        End Date: --    INSULIN ASPART PROTAMINE-INSULIN ASPART (NOVOLOG 70/30) 100 UNIT/ML (70-30) INPN PEN    Inject 5 Units into the skin Daily.       Start Date: --        End Date: --    INSULIN DETEMIR U-100 (LEVEMIR FLEXTOUCH U-100 INSULN) 100 UNIT/ML (3 ML) INPN PEN    48 Units 2 (two) times daily.       Start Date: 7/5/2022  End Date: --    LANCETS & BLOOD GLUCOSE STRIPS MISC    qid       Start Date: 11/1/2011 End Date: --    LOSARTAN (COZAAR) 25 MG TABLET    Take 25 mg by mouth.       Start Date: 3/19/2019 End Date: --    METOLAZONE (ZAROXOLYN) 5 MG TABLET    Take 1 tablet (5 mg total) by mouth daily as needed.       Start Date: 1/28/2019 End Date: --    METOPROLOL SUCCINATE (TOPROL-XL) 25 MG 24 HR TABLET    Take 25 mg by mouth.       Start Date: 7/1/2020  End Date: --    NITROGLYCERIN (NITROSTAT) 0.4 MG SL TABLET    Place 0.4 mg under the tongue every 5 (five) minutes as needed for Chest pain.       Start Date: --        End Date: --    PEN NEEDLE, DIABETIC (PEN NEEDLE) 31 GAUGE X 5/16" NDLE    USE ONE SYRINGE EVERY DAY       Start Date: 1/8/2018  End Date: --    PHENAZOPYRIDINE HCL (AZO ORAL)    Take by mouth as needed.       Start Date: --        End Date: --    PRADAXA 75 MG CAP    2 (two) times a day.       Start Date: 1/12/2021 End Date: --    RIVAROXABAN (XARELTO) 15 MG TAB    Take 15 mg by mouth once daily.        Start Date: 7/30/2018 End Date: --    SILDENAFIL (VIAGRA) 100 MG " TABLET    Take 50 mg by mouth.       Start Date: 2/27/2018 End Date: --    SYNTHROID 175 MCG TABLET    Take 1 tablet by mouth once daily at 6am.       Start Date: 4/26/2022 End Date: --    TAMSULOSIN (FLOMAX) 0.4 MG CAP           Start Date: 6/17/2022 End Date: --   Changed and/or Refilled Medications    Modified Medication Previous Medication    SERTRALINE (ZOLOFT) 50 MG TABLET sertraline (ZOLOFT) 100 MG tablet       Take 1 tablet (50 mg total) by mouth once daily.    Take 50 mg by mouth once daily.       Start Date: 7/21/2022 End Date: --    Start Date: --        End Date: 7/21/2022   Discontinued Medications    AMIODARONE (PACERONE) 100 MG TAB    Take 100 mg by mouth.       Start Date: 12/18/2018End Date: 7/20/2022    CALCITRIOL (ROCALTROL) 0.25 MCG CAP    Use on daily during the week and two daily on weekends.       Start Date: 1/28/2019 End Date: 7/20/2022    CIPROFLOXACIN-DEXAMETHASONE 0.3-0.1% (CIPRODEX) 0.3-0.1 % DRPS    Place 4 drops into the right ear 2 (two) times daily.       Start Date: 1/19/2022 End Date: 7/20/2022    LEVOCETIRIZINE (XYZAL) 5 MG TABLET    Take 1 tablet (5 mg total) by mouth every evening. for 10 days       Start Date: 12/12/2018End Date: 7/20/2022    LEVOTHYROXINE (SYNTHROID) 88 MCG TABLET    Take 1 tablet (88 mcg total) by mouth once daily.       Start Date: 1/28/2019 End Date: 7/20/2022    POTASSIUM CHLORIDE (KLOR-CON) 10 MEQ TBSR           Start Date: 3/31/2022 End Date: 7/20/2022    POTASSIUM CHLORIDE SA (K-DUR,KLOR-CON) 20 MEQ TABLET    Take 1 tablet (20 mEq total) by mouth 3 (three) times daily.       Start Date: 1/27/2021 End Date: 7/20/2022    UMECLIDINIUM-VILANTEROL (ANORO ELLIPTA) 62.5-25 MCG/ACTUATION DSDV    Inhale 1 puff into the lungs once daily.       Start Date: 5/13/2019 End Date: 7/20/2022

## 2022-07-20 NOTE — TELEPHONE ENCOUNTER
----- Message from Gisele Moseley sent at 7/20/2022  2:53 PM CDT -----  Contact: Videt/Wife  Patients wife is calling to speak with the nurse regarding additional information needed from today's visit. Reports that patient is taking 1/2 tablet of Sertraline 100 MG tablets. Also the other bottle with directions for 1/2 tablet of the 50 MG tablets and needs to be advised. Please give patient a call back at [phone] today only.  Thanks,  RP/AB

## 2022-07-20 NOTE — TELEPHONE ENCOUNTER
Patient called to update us on zoloft. Patient is taking 50mg daily, med card has been updated that patient will cut the 100mg tablets in half.

## 2022-07-21 RX ORDER — SERTRALINE HYDROCHLORIDE 50 MG/1
50 TABLET, FILM COATED ORAL DAILY
Qty: 90 TABLET | Refills: 3 | Status: SHIPPED | OUTPATIENT
Start: 2022-07-21

## 2022-07-21 NOTE — TELEPHONE ENCOUNTER
I have signed for the following orders AND/OR meds.  Please call the patient and ask the patient to schedule the testing AND/OR inform about any medications that were sent.      No orders of the defined types were placed in this encounter.      Medications Ordered This Encounter   Medications    sertraline (ZOLOFT) 50 MG tablet     Sig: Take 1 tablet (50 mg total) by mouth once daily.     Dispense:  90 tablet     Refill:  3

## 2022-07-26 NOTE — PROGRESS NOTES
Alfredo, the a1c is high.  You had mentioned that you had not used the long and short acting insulin.  Can you start that and start tracking your glucose levels and get me the log in 10 days?

## 2022-07-27 ENCOUNTER — HOSPITAL ENCOUNTER (OUTPATIENT)
Dept: RADIOLOGY | Facility: HOSPITAL | Age: 72
Discharge: HOME | End: 2022-07-27
Attending: FAMILY MEDICINE
Payer: COMMERCIAL

## 2022-07-27 DIAGNOSIS — R41.3 OTHER AMNESIA: ICD-10-CM

## 2022-07-27 PROCEDURE — 70450 CT HEAD/BRAIN W/O DYE: CPT | Mod: TC,PO

## 2022-07-27 PROCEDURE — 70450 CT HEAD WITHOUT CONTRAST: ICD-10-PCS | Mod: 26,,, | Performed by: RADIOLOGY

## 2022-07-27 PROCEDURE — 70450 CT HEAD/BRAIN W/O DYE: CPT | Mod: 26,,, | Performed by: RADIOLOGY

## 2022-07-27 NOTE — PROGRESS NOTES
This shows no significant abnormality which is good.  Please let me know how you are doing.  When we last spoke, we were concerned that this might have been a medication that had been stopped.  If you are having continued problems, I would want you to see the neurologist.

## 2022-07-29 ENCOUNTER — PATIENT MESSAGE (OUTPATIENT)
Dept: FAMILY MEDICINE | Facility: CLINIC | Age: 72
End: 2022-07-29
Payer: COMMERCIAL

## 2022-08-05 ENCOUNTER — CLINICAL SUPPORT (OUTPATIENT)
Dept: FAMILY MEDICINE | Facility: CLINIC | Age: 72
End: 2022-08-05
Payer: COMMERCIAL

## 2022-08-05 ENCOUNTER — TELEPHONE (OUTPATIENT)
Dept: FAMILY MEDICINE | Facility: CLINIC | Age: 72
End: 2022-08-05
Payer: COMMERCIAL

## 2022-08-05 ENCOUNTER — TELEPHONE (OUTPATIENT)
Dept: FAMILY MEDICINE | Facility: CLINIC | Age: 72
End: 2022-08-05

## 2022-08-05 ENCOUNTER — PATIENT MESSAGE (OUTPATIENT)
Dept: FAMILY MEDICINE | Facility: CLINIC | Age: 72
End: 2022-08-05

## 2022-08-05 DIAGNOSIS — Z91.89 AT INCREASED RISK OF EXPOSURE TO COVID-19 VIRUS: Primary | ICD-10-CM

## 2022-08-05 DIAGNOSIS — U07.1 COVID: ICD-10-CM

## 2022-08-05 DIAGNOSIS — U07.1 LAB TEST POSITIVE FOR DETECTION OF COVID-19 VIRUS: Primary | ICD-10-CM

## 2022-08-05 LAB
CTP QC/QA: YES
SARS-COV-2 RDRP RESP QL NAA+PROBE: POSITIVE

## 2022-08-05 PROCEDURE — U0002 COVID-19 LAB TEST NON-CDC: HCPCS | Mod: QW,S$GLB,, | Performed by: FAMILY MEDICINE

## 2022-08-05 PROCEDURE — U0002: ICD-10-PCS | Mod: QW,S$GLB,, | Performed by: FAMILY MEDICINE

## 2022-08-05 NOTE — PROGRESS NOTES
I ordered an infusion via a phone message. Please confirm that they will do this for the patient and if not, I need to know why.

## 2022-08-05 NOTE — TELEPHONE ENCOUNTER
----- Message from Ruby Cooper sent at 8/5/2022  2:53 PM CDT -----  Contact: Videt/ Wife  Patients wife is calling to speak to the nurse regarding testing positive for covid. Reports wanting to know whats next. Please give patients wife a call back at .[phone]

## 2022-08-05 NOTE — TELEPHONE ENCOUNTER
Spoke with pt's wife regarding recommendations/order placed.  Also, tried to call infusion center, no answer.

## 2022-08-05 NOTE — TELEPHONE ENCOUNTER
COVID Risk of Complication Score   Your Covid Risk Score Is: 9    1-2:Low Risk  3-5:Medium Risk  6 or greater:High Risk      Due to risks of the paxlovid, he should have the infusion if possible.  I placed an order.  Call the infusion center to confirm that they are going to treat him and that they understand he should not be off of his xarelto at this time so the infusion would be better.

## 2022-08-05 NOTE — TELEPHONE ENCOUNTER
Patient's wife test positive for Covid a few days ago and now he has symptoms. Would like to come by for a covid test

## 2022-08-05 NOTE — TELEPHONE ENCOUNTER
----- Message from Renée Venegas sent at 8/5/2022 11:42 AM CDT -----  Contact: Evy/wife  Evy would like a call back at [phone], Regards to getting coming in for a Covid test.    Thanks  Td

## 2022-08-11 ENCOUNTER — TELEPHONE (OUTPATIENT)
Dept: FAMILY MEDICINE | Facility: CLINIC | Age: 72
End: 2022-08-11
Payer: COMMERCIAL

## 2022-08-11 NOTE — TELEPHONE ENCOUNTER
----- Message from Mitra Sher sent at 8/11/2022 11:30 AM CDT -----  Contact: wifeEvy [phone]  Pt tested positive for Covid last week. Would like you to enter orders to come in and be retested this week b/c needs a Covid negative test prior to upcoming sx scheduled on Monday. He wife would like to come in with him also. Message sent separate.

## 2022-08-11 NOTE — TELEPHONE ENCOUNTER
"----- Message from Gisele Moseley sent at 8/11/2022 12:34 PM CDT -----  Contact: Evy/wife  Type:  Patient Returning Call    Who Called: Evy  Who Left Message for Patient: unknown   Does the patient know what this is regarding?:patient originally called for covid re-test   Would the patient rather a call back or a response via [facility]? call  Best Call Back Number: [phone]  Additional Information:  reports missing a couple of calls, explains needing patient to be re-tested for Covid and a call back "only" at the phone number above the other number is currently out of order. Request is also for wife (message sent). Patient due for surgery Monday.   Thanks,  RP/AB          "

## 2022-08-11 NOTE — TELEPHONE ENCOUNTER
Call returned. Patient was recently diagnosed with COVID19 on 8/5/22. Patient's wife reports that her  has a procedure scheduled for Monday with Dr. Story and needs to be retested to determine if he is still infectious. Patient advised re-testing is not recommended this soon after infection. Advised that he may end quarantine after 5 days from start of symptoms, has had improvement in symptoms, and fever free for at least 24 hours without use of fever-reducing medications. Advised contacting Dr. Story office to determine if any necessary testing is required or if procedure needs to be rescheduled for later date.

## 2022-10-18 ENCOUNTER — PATIENT MESSAGE (OUTPATIENT)
Dept: ADMINISTRATIVE | Facility: HOSPITAL | Age: 72
End: 2022-10-18
Payer: COMMERCIAL

## 2022-11-03 ENCOUNTER — OFFICE VISIT (OUTPATIENT)
Dept: ORTHOPEDICS | Facility: CLINIC | Age: 72
End: 2022-11-03
Payer: COMMERCIAL

## 2022-11-03 ENCOUNTER — HOSPITAL ENCOUNTER (OUTPATIENT)
Dept: RADIOLOGY | Facility: HOSPITAL | Age: 72
Discharge: HOME | End: 2022-11-03
Payer: COMMERCIAL

## 2022-11-03 VITALS — WEIGHT: 256 LBS | HEIGHT: 70 IN | BODY MASS INDEX: 36.65 KG/M2

## 2022-11-03 DIAGNOSIS — M79.641 RIGHT HAND PAIN: ICD-10-CM

## 2022-11-03 DIAGNOSIS — M79.89 SWELLING OF MIDDLE FINGER: Primary | ICD-10-CM

## 2022-11-03 DIAGNOSIS — Z71.89 COMPLEX CARE COORDINATION: ICD-10-CM

## 2022-11-03 DIAGNOSIS — M79.641 RIGHT HAND PAIN: Primary | ICD-10-CM

## 2022-11-03 PROCEDURE — 3066F NEPHROPATHY DOC TX: CPT | Mod: CPTII,S$GLB,,

## 2022-11-03 PROCEDURE — 3061F NEG MICROALBUMINURIA REV: CPT | Mod: CPTII,S$GLB,,

## 2022-11-03 PROCEDURE — 1125F AMNT PAIN NOTED PAIN PRSNT: CPT | Mod: CPTII,S$GLB,,

## 2022-11-03 PROCEDURE — 73130 XR HAND COMPLETE 3 VIEW RIGHT: ICD-10-PCS | Mod: 26,RT,, | Performed by: RADIOLOGY

## 2022-11-03 PROCEDURE — 3008F BODY MASS INDEX DOCD: CPT | Mod: CPTII,S$GLB,,

## 2022-11-03 PROCEDURE — 3288F PR FALLS RISK ASSESSMENT DOCUMENTED: ICD-10-PCS | Mod: CPTII,S$GLB,,

## 2022-11-03 PROCEDURE — 73130 X-RAY EXAM OF HAND: CPT | Mod: TC,RT

## 2022-11-03 PROCEDURE — 99999 PR PBB SHADOW E&M-EST. PATIENT-LVL V: ICD-10-PCS | Mod: PBBFAC,,,

## 2022-11-03 PROCEDURE — 99203 OFFICE O/P NEW LOW 30 MIN: CPT | Mod: S$GLB,,,

## 2022-11-03 PROCEDURE — 3046F PR MOST RECENT HEMOGLOBIN A1C LEVEL > 9.0%: ICD-10-PCS | Mod: CPTII,S$GLB,,

## 2022-11-03 PROCEDURE — 3066F PR DOCUMENTATION OF TREATMENT FOR NEPHROPATHY: ICD-10-PCS | Mod: CPTII,S$GLB,,

## 2022-11-03 PROCEDURE — 1101F PR PT FALLS ASSESS DOC 0-1 FALLS W/OUT INJ PAST YR: ICD-10-PCS | Mod: CPTII,S$GLB,,

## 2022-11-03 PROCEDURE — 99999 PR PBB SHADOW E&M-EST. PATIENT-LVL V: CPT | Mod: PBBFAC,,,

## 2022-11-03 PROCEDURE — 73130 X-RAY EXAM OF HAND: CPT | Mod: 26,RT,, | Performed by: RADIOLOGY

## 2022-11-03 PROCEDURE — 1101F PT FALLS ASSESS-DOCD LE1/YR: CPT | Mod: CPTII,S$GLB,,

## 2022-11-03 PROCEDURE — 1159F MED LIST DOCD IN RCRD: CPT | Mod: CPTII,S$GLB,,

## 2022-11-03 PROCEDURE — 1159F PR MEDICATION LIST DOCUMENTED IN MEDICAL RECORD: ICD-10-PCS | Mod: CPTII,S$GLB,,

## 2022-11-03 PROCEDURE — 3008F PR BODY MASS INDEX (BMI) DOCUMENTED: ICD-10-PCS | Mod: CPTII,S$GLB,,

## 2022-11-03 PROCEDURE — 3061F PR NEG MICROALBUMINURIA RESULT DOCUMENTED/REVIEW: ICD-10-PCS | Mod: CPTII,S$GLB,,

## 2022-11-03 PROCEDURE — 3288F FALL RISK ASSESSMENT DOCD: CPT | Mod: CPTII,S$GLB,,

## 2022-11-03 PROCEDURE — 99203 PR OFFICE/OUTPT VISIT, NEW, LEVL III, 30-44 MIN: ICD-10-PCS | Mod: S$GLB,,,

## 2022-11-03 PROCEDURE — 1125F PR PAIN SEVERITY QUANTIFIED, PAIN PRESENT: ICD-10-PCS | Mod: CPTII,S$GLB,,

## 2022-11-03 PROCEDURE — 3046F HEMOGLOBIN A1C LEVEL >9.0%: CPT | Mod: CPTII,S$GLB,,

## 2022-11-03 RX ORDER — SULFAMETHOXAZOLE AND TRIMETHOPRIM 800; 160 MG/1; MG/1
1 TABLET ORAL 2 TIMES DAILY
Qty: 14 TABLET | Refills: 0 | Status: SHIPPED | OUTPATIENT
Start: 2022-11-03 | End: 2022-11-17 | Stop reason: SDUPTHER

## 2022-11-03 RX ORDER — METHYLPREDNISOLONE 4 MG/1
TABLET ORAL
Qty: 21 EACH | Refills: 0 | Status: SHIPPED | OUTPATIENT
Start: 2022-11-03 | End: 2022-11-16

## 2022-11-03 NOTE — PROGRESS NOTES
SUBJECTIVE:      Chief Complaint: Pain and Swelling of the Right Hand    Referring Provider: Self, Aaareferral     History of Present Illness:  Patient is a 72 y.o. male who presents today with complaints of right middle finger swelling.   Onset of symptoms was about 4-6 weeks ago.  States that it has gotten worse in the last week.  Denies injury or trauma to area. The location of the pain is the right middle finger.  He has gotten 2 rounds of laser heat therapy done by his chiropractor to the finger, states that that made it better, but it got worse again.  Pain is 7/10.  He states that he can not extend it because it is so swollen.  It is also affecting his sleep.  He states that he eats a lot of red meat, tuna, and chicken for his diet.  He denies a history of gout.  He is a jeweler.  The patient denies any fevers, chills, N/V, D/C and presents for evaluation.       Past Medical History:   Diagnosis Date    Atrial fibrillation     CAD (coronary artery disease)     CKD stage 3 due to type 2 diabetes mellitus 1/4/2018    Lab Results Component Value Date  CREATININE 2.04 (H) 01/03/2018  BUN 22 01/03/2018   01/03/2018  K 4.2 01/03/2018   01/03/2018  CO2 30 01/03/2018  BMP Lab Results Component Value Date   01/03/2018  K 4.2 01/03/2018   01/03/2018  CO2 30 01/03/2018  BUN 22 01/03/2018  CREATININE 2.04 (H) 01/03/2018  CALCIUM 8.7 01/03/2018  ANIONGAP 8 01/27/2015  ESTGFRAFRICA 38 (L) 01/03/201    Diabetes mellitus     Diabetes mellitus type II     Elevated PSA     History of colon polyps 1/8/2018    He has had colon polyps in the past and he is screened serially for this.     Hyperlipidemia associated with type 2 diabetes mellitus 7/10/2012    Hyperlipidemia LDL goal < 70     Hypertension     Hypertension goal BP (blood pressure) < 130/80     Mass of adrenal gland     Mild acid reflux     Prostate cancer     Skin cancer     Skin cancer (melanoma)      Past Surgical History:   Procedure  Laterality Date    CARDIAC CATHETERIZATION  02/2015    CARDIOVERSION      CHOLECYSTECTOMY      COLONOSCOPY N/A 02/01/2019    Procedure: COLONOSCOPY-obtain cardiology clearance;  Surgeon: Rashid Engel III, MD;  Location: [facility];  Service: Endoscopy;  Laterality: N/A;    CORONARY ARTERY BYPASS GRAFT      EYE SURGERY      mohs      PROSTATE SURGERY      radiation prostate      SKIN CANCER EXCISION      TONSILLECTOMY      TOTAL THYROIDECTOMY      removed due to cancer.    TRANSURETHRAL RESECTION OF PROSTATE       Review of patient's allergies indicates:   Allergen Reactions    Codeine      Other reaction(s): Hallucinations    Fish oil Rash and Itching     Social History     Social History Narrative    Not on file     Family History   Problem Relation Age of Onset    Stroke Mother     Hypertension Mother     Heart disease Father     Coronary artery disease Father     Diabetes Father     Heart disease Paternal Aunt     Stroke Maternal Grandmother          Current Outpatient Medications:     albuterol (PROVENTIL/VENTOLIN HFA) 90 mcg/actuation inhaler, Inhale 2 puffs into the lungs every 6 (six) hours as needed for Wheezing., Disp: 18 g, Rfl: 1    allopurinoL (ZYLOPRIM) 100 MG tablet, , Disp: , Rfl:     alogliptin 6.25 mg Tab, Take 6.25 mg by mouth daily as needed., Disp: , Rfl:     atorvastatin (LIPITOR) 80 MG tablet, Take 1 tablet (80 mg total) by mouth once daily., Disp: 90 tablet, Rfl: 1    BREEZE 2 TEST STRIPS Strp, USE ONE STRIP TO CHECK GLUCOSE 3 TO 4 TIMES DAILY AS NEEDED, Disp: 100 strip, Rfl: 11    docusate sodium (COLACE) 100 MG capsule, 3 (three) times daily as needed. Every day, Disp: , Rfl:     famotidine (PEPCID) 20 MG tablet, 2 (two) times daily. Every day, Disp: , Rfl:     folic acid (FOLVITE) 1 MG tablet, , Disp: , Rfl:     furosemide (LASIX) 40 MG tablet, , Disp: , Rfl:     guaiFENesin (MUCINEX) 600 mg 12 hr tablet, Take 600 mg by mouth., Disp: , Rfl:     insulin aspart protamine-insulin aspart  "(NOVOLOG 70/30) 100 unit/mL (70-30) InPn pen, Inject 5 Units into the skin Daily., Disp: , Rfl:     insulin detemir U-100 (LEVEMIR FLEXTOUCH U-100 INSULN) 100 unit/mL (3 mL) InPn pen, 48 Units 2 (two) times daily., Disp: , Rfl:     LANCETS & BLOOD GLUCOSE STRIPS MISC, qid, Disp: , Rfl:     losartan (COZAAR) 25 MG tablet, Take 25 mg by mouth., Disp: , Rfl:     metOLazone (ZAROXOLYN) 5 MG tablet, Take 1 tablet (5 mg total) by mouth daily as needed., Disp: 90 tablet, Rfl: 1    metoprolol succinate (TOPROL-XL) 25 MG 24 hr tablet, Take 25 mg by mouth., Disp: , Rfl:     nitroGLYCERIN (NITROSTAT) 0.4 MG SL tablet, Place 0.4 mg under the tongue every 5 (five) minutes as needed for Chest pain., Disp: , Rfl:     pen needle, diabetic (PEN NEEDLE) 31 gauge x 5/16" Ndle, USE ONE SYRINGE EVERY DAY, Disp: 100 each, Rfl: 3    phenazopyridine HCl (AZO ORAL), Take by mouth as needed., Disp: , Rfl:     potassium chloride (MICRO-K) 10 MEQ CpSR, Take 2 capsules (20 mEq total) by mouth 3 (three) times daily., Disp: 540 capsule, Rfl: 3    PRADAXA 75 mg Cap, 2 (two) times a day., Disp: , Rfl:     rivaroxaban (XARELTO) 15 mg Tab, Take 15 mg by mouth once daily. , Disp: , Rfl:     sertraline (ZOLOFT) 50 MG tablet, Take 1 tablet (50 mg total) by mouth once daily., Disp: 90 tablet, Rfl: 3    sildenafil (VIAGRA) 100 MG tablet, Take 50 mg by mouth., Disp: , Rfl:     SYNTHROID 175 mcg tablet, Take 1 tablet by mouth once daily at 6am., Disp: , Rfl:     tamsulosin (FLOMAX) 0.4 mg Cap, , Disp: , Rfl:     methylPREDNISolone (MEDROL DOSEPACK) 4 mg tablet, use as directed, Disp: 21 each, Rfl: 0    sulfamethoxazole-trimethoprim 800-160mg (BACTRIM DS) 800-160 mg Tab, Take 1 tablet by mouth 2 (two) times daily., Disp: 14 tablet, Rfl: 0    Current Facility-Administered Medications:     lidocaine HCL 10 mg/ml (1%) injection 1 mL, 1 mL, Intramuscular, Once, Henri Singh MD      Review of Systems:  Constitutional: Negative for chills and fever. " "  Respiratory: Negative for cough and shortness of breath.    Gastrointestinal: Negative for nausea and vomiting.   Skin: Negative for rash.   Neurological: Negative for dizziness and headaches.   Psychiatric/Behavioral: Negative for depression.   MSK as in HPI     OBJECTIVE:      Vital Signs (Most Recent):  Vitals:    11/03/22 1443   Weight: 116.1 kg (256 lb)   Height: 5' 10" (1.778 m)     Body mass index is 36.73 kg/m².      Physical Exam:  Constitutional: The patient appears well-developed and well-nourished. No distress.   Skin: No lesions appreciated  Head: Normocephalic and atraumatic.   Nose: Nose normal.   Ears: No deformities seen  Eyes: Conjunctivae and EOM are normal.   Neck: No tracheal deviation present.   Cardiovascular: Normal rate and intact distal pulses.    Pulmonary/Chest: Effort normal. No respiratory distress.   Abdominal: There is no guarding.   Neurological: The patient is alert.   Psychiatric: The patient has a normal mood and affect.     General    Vitals reviewed.            Right Hand/Wrist Exam     Inspection   Scars: Wrist - absent Hand -  absent  Effusion: Wrist - absent     Pain   Hand - The patient exhibits pain of the middle IP and middle MCP.    Swelling   Hand - The patient is swollen on the middle IP and middle MCP.    Other     Neuorologic Exam    Median Distribution: normal  Ulnar Distribution: normal  Radial Distribution: normal    Comments:  The middle finger is erythematous, edematous, and warm to the touch. + tenderness to palpation.  See picture attached  Range of motion of middle finger decreased secondary to pain and swelling  Middle finger NVI  No motor or sensory deficits  No lesions or abrasions noted to finger      Left Hand/Wrist Exam     Inspection   Scars: Wrist - absent Hand -  absent  Effusion: Wrist - absent           Vascular Exam       Capillary Refill  Right Hand: normal capillary refill            Diagnostic Results:    EXAMINATION:  XR HAND COMPLETE 3 VIEW " RIGHT     CLINICAL HISTORY:  Pain in right hand     TECHNIQUE:  PA, lateral, and oblique views of the right hand were performed.     COMPARISON:  None     FINDINGS:  No acute, displaced fracture.  No aggressive osseous abnormality.  No dislocation.  Mild joint space narrowing and subchondral sclerosis at the radiocarpal joint.  Joint space narrowing and mild osteophytosis at the 1st carpometacarpal and metacarpophalangeal joints.  Vascular calcifications.     Impression:     Multifocal degenerative change including at the radiocarpal joint, 1st CMC and IP joints.        Electronically signed by: Xochilt Castaneda  Date:                                            11/03/2022  Time:                                           16:06     Imaging - I independently viewed the patient's imaging as well as the radiology report.  Xrays of the patient's right hand demonstrate no evidence of any acute fractures or dislocations or significant degenerative changes.    ASSESSMENT/PLAN:        ICD-10-CM ICD-9-CM   1. Swelling of middle finger  M79.89 729.81     Orders Placed This Encounter    sulfamethoxazole-trimethoprim 800-160mg (BACTRIM DS) 800-160 mg Tab    methylPREDNISolone (MEDROL DOSEPACK) 4 mg tablet     No orders of the defined types were placed in this encounter.    Plan:     -x-rays reviewed, no acute changes  -discussed with patient diagnosis of gout flare up versus infection and how they present very similarly  -NSAIDs contraindicated due to stage 4 kidney disease  -Bactrim DS b.i.d. sent to pharmacy. Start this today.  -Medrol Dosepak sent to pharmacy. Start this on Saturday.  -follow up in 10 days or sooner if needed    Should the patient's symptoms worsen, persist, or fail to improve they should return for reevaluation and I would be happy to see them back anytime.    Veronica Lares PA-C   [facility]     Please be aware that this note has been generated with the assistance of [employer] voice-to-text.  Please excuse  any spelling or grammatical errors.

## 2022-11-16 ENCOUNTER — PATIENT MESSAGE (OUTPATIENT)
Dept: FAMILY MEDICINE | Facility: CLINIC | Age: 72
End: 2022-11-16
Payer: COMMERCIAL

## 2022-11-16 ENCOUNTER — HOSPITAL ENCOUNTER (OUTPATIENT)
Dept: RADIOLOGY | Facility: HOSPITAL | Age: 72
Discharge: HOME | End: 2022-11-16
Payer: COMMERCIAL

## 2022-11-16 ENCOUNTER — OFFICE VISIT (OUTPATIENT)
Dept: ORTHOPEDICS | Facility: CLINIC | Age: 72
End: 2022-11-16
Payer: COMMERCIAL

## 2022-11-16 VITALS — BODY MASS INDEX: 36.64 KG/M2 | HEIGHT: 70 IN | WEIGHT: 255.94 LBS

## 2022-11-16 DIAGNOSIS — M79.89 SWELLING OF MIDDLE FINGER: ICD-10-CM

## 2022-11-16 DIAGNOSIS — M79.89 SWELLING OF MIDDLE FINGER: Primary | ICD-10-CM

## 2022-11-16 PROCEDURE — 1159F PR MEDICATION LIST DOCUMENTED IN MEDICAL RECORD: ICD-10-PCS | Mod: CPTII,S$GLB,,

## 2022-11-16 PROCEDURE — 3061F PR NEG MICROALBUMINURIA RESULT DOCUMENTED/REVIEW: ICD-10-PCS | Mod: CPTII,S$GLB,,

## 2022-11-16 PROCEDURE — 3046F PR MOST RECENT HEMOGLOBIN A1C LEVEL > 9.0%: ICD-10-PCS | Mod: CPTII,S$GLB,,

## 2022-11-16 PROCEDURE — 1101F PR PT FALLS ASSESS DOC 0-1 FALLS W/OUT INJ PAST YR: ICD-10-PCS | Mod: CPTII,S$GLB,,

## 2022-11-16 PROCEDURE — 76882 US SOFT TISSUE, UPPER EXTREMITY, RIGHT: ICD-10-PCS | Mod: 26,RT,, | Performed by: RADIOLOGY

## 2022-11-16 PROCEDURE — 1125F PR PAIN SEVERITY QUANTIFIED, PAIN PRESENT: ICD-10-PCS | Mod: CPTII,S$GLB,,

## 2022-11-16 PROCEDURE — 3066F NEPHROPATHY DOC TX: CPT | Mod: CPTII,S$GLB,,

## 2022-11-16 PROCEDURE — 1125F AMNT PAIN NOTED PAIN PRSNT: CPT | Mod: CPTII,S$GLB,,

## 2022-11-16 PROCEDURE — 3008F PR BODY MASS INDEX (BMI) DOCUMENTED: ICD-10-PCS | Mod: CPTII,S$GLB,,

## 2022-11-16 PROCEDURE — 1101F PT FALLS ASSESS-DOCD LE1/YR: CPT | Mod: CPTII,S$GLB,,

## 2022-11-16 PROCEDURE — 76882 US LMTD JT/FCL EVL NVASC XTR: CPT | Mod: 26,RT,, | Performed by: RADIOLOGY

## 2022-11-16 PROCEDURE — 3046F HEMOGLOBIN A1C LEVEL >9.0%: CPT | Mod: CPTII,S$GLB,,

## 2022-11-16 PROCEDURE — 76882 US LMTD JT/FCL EVL NVASC XTR: CPT | Mod: TC,PO,RT

## 2022-11-16 PROCEDURE — 3061F NEG MICROALBUMINURIA REV: CPT | Mod: CPTII,S$GLB,,

## 2022-11-16 PROCEDURE — 1159F MED LIST DOCD IN RCRD: CPT | Mod: CPTII,S$GLB,,

## 2022-11-16 PROCEDURE — 3288F FALL RISK ASSESSMENT DOCD: CPT | Mod: CPTII,S$GLB,,

## 2022-11-16 PROCEDURE — 99213 OFFICE O/P EST LOW 20 MIN: CPT | Mod: S$GLB,,,

## 2022-11-16 PROCEDURE — 3066F PR DOCUMENTATION OF TREATMENT FOR NEPHROPATHY: ICD-10-PCS | Mod: CPTII,S$GLB,,

## 2022-11-16 PROCEDURE — 99999 PR PBB SHADOW E&M-EST. PATIENT-LVL III: ICD-10-PCS | Mod: PBBFAC,,,

## 2022-11-16 PROCEDURE — 99213 PR OFFICE/OUTPT VISIT, EST, LEVL III, 20-29 MIN: ICD-10-PCS | Mod: S$GLB,,,

## 2022-11-16 PROCEDURE — 3288F PR FALLS RISK ASSESSMENT DOCUMENTED: ICD-10-PCS | Mod: CPTII,S$GLB,,

## 2022-11-16 PROCEDURE — 3008F BODY MASS INDEX DOCD: CPT | Mod: CPTII,S$GLB,,

## 2022-11-16 PROCEDURE — 99999 PR PBB SHADOW E&M-EST. PATIENT-LVL III: CPT | Mod: PBBFAC,,,

## 2022-11-16 NOTE — TELEPHONE ENCOUNTER
Xray reviewed.  I can see him virtually at 7:30 tomorrow morning if you want.  Let him know that I am just getting to messages and did not realize that he would be in the clinic today because I am just seeing the messages

## 2022-11-16 NOTE — PROGRESS NOTES
SUBJECTIVE:      Chief Complaint: Pain and Swelling of the Right Hand    Referring Provider: No ref. provider found     History of Present Illness: Patient presents for follow up of the right middle finger swelling.  He has finished the Bactrim and took it as prescribed.  He states he is still having problems, still complains of redness and swelling and can not make a fist.  Pain is 7/10.  However, the redness and swelling is not so much at the MCP joint now but is more at the long finger PIP joint instead.  He is not taking any medicine for pain.  He can not take NSAIDs due to stage 4 kidney disease.  He is a CHF patient as well and can not take steroids.  Denies fever, sweats, chills.    Interval hx 11/3/22: Patient is a 72 y.o. male who presents today with complaints of right middle finger swelling.   Onset of symptoms was about 4-6 weeks ago.  States that it has gotten worse in the last week.  Denies injury or trauma to area. The location of the pain is the right middle finger.  He has gotten 2 rounds of laser heat therapy done by his chiropractor to the finger, states that that made it better, but it got worse again.  Pain is 7/10.  He states that he can not extend it because it is so swollen.  It is also affecting his sleep.  He states that he eats a lot of red meat, tuna, and chicken for his diet.  He denies a history of gout.  He is a jeweler.  The patient denies any fevers, chills, N/V, D/C and presents for evaluation.       Past Medical History:   Diagnosis Date    Atrial fibrillation     CAD (coronary artery disease)     CKD stage 3 due to type 2 diabetes mellitus 1/4/2018    Lab Results Component Value Date  CREATININE 2.04 (H) 01/03/2018  BUN 22 01/03/2018   01/03/2018  K 4.2 01/03/2018   01/03/2018  CO2 30 01/03/2018  BMP Lab Results Component Value Date   01/03/2018  K 4.2 01/03/2018   01/03/2018  CO2 30 01/03/2018  BUN 22 01/03/2018  CREATININE 2.04 (H) 01/03/2018   CALCIUM 8.7 01/03/2018  ANIONGAP 8 01/27/2015  ESTGFRAFRICA 38 (L) 01/03/201    Diabetes mellitus     Diabetes mellitus type II     Elevated PSA     History of colon polyps 1/8/2018    He has had colon polyps in the past and he is screened serially for this.     Hyperlipidemia associated with type 2 diabetes mellitus 7/10/2012    Hyperlipidemia LDL goal < 70     Hypertension     Hypertension goal BP (blood pressure) < 130/80     Mass of adrenal gland     Mild acid reflux     Prostate cancer     Skin cancer     Skin cancer (melanoma)      Past Surgical History:   Procedure Laterality Date    CARDIAC CATHETERIZATION  02/2015    CARDIOVERSION      CHOLECYSTECTOMY      COLONOSCOPY N/A 02/01/2019    Procedure: COLONOSCOPY-obtain cardiology clearance;  Surgeon: Rashid Engel III, MD;  Location: [facility];  Service: Endoscopy;  Laterality: N/A;    CORONARY ARTERY BYPASS GRAFT      EYE SURGERY      mohs      PROSTATE SURGERY      radiation prostate      SKIN CANCER EXCISION      TONSILLECTOMY      TOTAL THYROIDECTOMY      removed due to cancer.    TRANSURETHRAL RESECTION OF PROSTATE       Review of patient's allergies indicates:   Allergen Reactions    Codeine      Other reaction(s): Hallucinations    Fish oil Rash and Itching     Social History     Social History Narrative    Not on file     Family History   Problem Relation Age of Onset    Stroke Mother     Hypertension Mother     Heart disease Father     Coronary artery disease Father     Diabetes Father     Heart disease Paternal Aunt     Stroke Maternal Grandmother          Current Outpatient Medications:     albuterol (PROVENTIL/VENTOLIN HFA) 90 mcg/actuation inhaler, Inhale 2 puffs into the lungs every 6 (six) hours as needed for Wheezing., Disp: 18 g, Rfl: 1    allopurinoL (ZYLOPRIM) 100 MG tablet, , Disp: , Rfl:     alogliptin 6.25 mg Tab, Take 6.25 mg by mouth daily as needed., Disp: , Rfl:     atorvastatin (LIPITOR) 80 MG tablet, Take 1 tablet (80 mg total)  "by mouth once daily., Disp: 90 tablet, Rfl: 1    BREEZE 2 TEST STRIPS Strp, USE ONE STRIP TO CHECK GLUCOSE 3 TO 4 TIMES DAILY AS NEEDED, Disp: 100 strip, Rfl: 11    docusate sodium (COLACE) 100 MG capsule, 3 (three) times daily as needed. Every day, Disp: , Rfl:     famotidine (PEPCID) 20 MG tablet, 2 (two) times daily. Every day, Disp: , Rfl:     folic acid (FOLVITE) 1 MG tablet, , Disp: , Rfl:     furosemide (LASIX) 40 MG tablet, , Disp: , Rfl:     guaiFENesin (MUCINEX) 600 mg 12 hr tablet, Take 600 mg by mouth., Disp: , Rfl:     insulin aspart protamine-insulin aspart (NOVOLOG 70/30) 100 unit/mL (70-30) InPn pen, Inject 5 Units into the skin Daily., Disp: , Rfl:     insulin detemir U-100 (LEVEMIR FLEXTOUCH U-100 INSULN) 100 unit/mL (3 mL) InPn pen, 48 Units 2 (two) times daily., Disp: , Rfl:     LANCETS & BLOOD GLUCOSE STRIPS MISC, qid, Disp: , Rfl:     losartan (COZAAR) 25 MG tablet, Take 25 mg by mouth., Disp: , Rfl:     metOLazone (ZAROXOLYN) 5 MG tablet, Take 1 tablet (5 mg total) by mouth daily as needed., Disp: 90 tablet, Rfl: 1    metoprolol succinate (TOPROL-XL) 25 MG 24 hr tablet, Take 25 mg by mouth., Disp: , Rfl:     nitroGLYCERIN (NITROSTAT) 0.4 MG SL tablet, Place 0.4 mg under the tongue every 5 (five) minutes as needed for Chest pain., Disp: , Rfl:     pen needle, diabetic (PEN NEEDLE) 31 gauge x 5/16" Ndle, USE ONE SYRINGE EVERY DAY, Disp: 100 each, Rfl: 3    phenazopyridine HCl (AZO ORAL), Take by mouth as needed., Disp: , Rfl:     potassium chloride (MICRO-K) 10 MEQ CpSR, Take 2 capsules (20 mEq total) by mouth 3 (three) times daily., Disp: 540 capsule, Rfl: 3    PRADAXA 75 mg Cap, 2 (two) times a day., Disp: , Rfl:     rivaroxaban (XARELTO) 15 mg Tab, Take 15 mg by mouth once daily. , Disp: , Rfl:     sertraline (ZOLOFT) 50 MG tablet, Take 1 tablet (50 mg total) by mouth once daily., Disp: 90 tablet, Rfl: 3    sildenafil (VIAGRA) 100 MG tablet, Take 50 mg by mouth., Disp: , Rfl:     " "sulfamethoxazole-trimethoprim 800-160mg (BACTRIM DS) 800-160 mg Tab, Take 1 tablet by mouth 2 (two) times daily., Disp: 14 tablet, Rfl: 0    SYNTHROID 175 mcg tablet, Take 1 tablet by mouth once daily at 6am., Disp: , Rfl:     tamsulosin (FLOMAX) 0.4 mg Cap, , Disp: , Rfl:     Current Facility-Administered Medications:     lidocaine HCL 10 mg/ml (1%) injection 1 mL, 1 mL, Intramuscular, Once, Henri Singh MD      Review of Systems:  Constitutional: Negative for chills and fever.   Respiratory: Negative for cough and shortness of breath.    Gastrointestinal: Negative for nausea and vomiting.   Skin: Negative for rash.   Neurological: Negative for dizziness and headaches.   Psychiatric/Behavioral: Negative for depression.   MSK as in HPI     OBJECTIVE:      Vital Signs (Most Recent):  Vitals:    11/16/22 0955   Weight: 116.1 kg (255 lb 15.3 oz)   Height: 5' 10" (1.778 m)       Body mass index is 36.73 kg/m².      Physical Exam:  Constitutional: The patient appears well-developed and well-nourished. No distress.   Skin: No lesions appreciated  Head: Normocephalic and atraumatic.   Nose: Nose normal.   Ears: No deformities seen  Eyes: Conjunctivae and EOM are normal.   Neck: No tracheal deviation present.   Cardiovascular: Normal rate and intact distal pulses.    Pulmonary/Chest: Effort normal. No respiratory distress.   Abdominal: There is no guarding.   Neurological: The patient is alert.   Psychiatric: The patient has a normal mood and affect.     General    Vitals reviewed.            Right Hand/Wrist Exam     Inspection   Scars: Wrist - absent Hand -  absent  Effusion: Wrist - absent     Pain   Hand - The patient exhibits pain of the middle IP.    Swelling   Hand - The patient is swollen on the middle IP.    Other     Neuorologic Exam    Median Distribution: normal  Ulnar Distribution: normal  Radial Distribution: normal    Comments:  Two firm, mobile subcutaneous nodules noted at the dorsal PIP, tender to " palpation.  See picture attached  Still mildly erythematous and edematous locally, much improved since last visit.  Range of motion of middle finger decreased secondary to pain and swelling  Middle finger NVI  No motor or sensory deficits  No lesions or abrasions noted to finger      Left Hand/Wrist Exam     Inspection   Scars: Wrist - absent Hand -  absent  Effusion: Wrist - absent           Vascular Exam       Capillary Refill  Right Hand: normal capillary refill            Diagnostic Results:    EXAMINATION:  XR HAND COMPLETE 3 VIEW RIGHT     CLINICAL HISTORY:  Pain in right hand     TECHNIQUE:  PA, lateral, and oblique views of the right hand were performed.     COMPARISON:  None     FINDINGS:  No acute, displaced fracture.  No aggressive osseous abnormality.  No dislocation.  Mild joint space narrowing and subchondral sclerosis at the radiocarpal joint.  Joint space narrowing and mild osteophytosis at the 1st carpometacarpal and metacarpophalangeal joints.  Vascular calcifications.     Impression:     Multifocal degenerative change including at the radiocarpal joint, 1st CMC and IP joints.        Electronically signed by: Xochilt Csataneda  Date:                                            11/03/2022  Time:                                           16:06     Imaging - I independently viewed the patient's imaging as well as the radiology report.  Xrays of the patient's right hand demonstrate no evidence of any acute fractures or dislocations or significant degenerative changes.    ASSESSMENT/PLAN:        ICD-10-CM ICD-9-CM   1. 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   -Ultrasound of  right middle finger ordered for further workup.  He will get that done today in [place].  - x-rays from last visit reviewed, no foreign body seen, no acute changes  -patient states that the pain is not severe enough for any additional medication  -follow-up with Dr. Ponce for ultrasound results    Should the patient's symptoms worsen, persist, or fail to improve they should return for reevaluation and I would be happy to see them back anytime.    Veronica Lares PA-C   [facility]     Please be aware that this note has been generated with the assistance of [employer] voice-to-text.  Please excuse any spelling or grammatical errors.

## 2022-11-17 ENCOUNTER — OFFICE VISIT (OUTPATIENT)
Dept: FAMILY MEDICINE | Facility: CLINIC | Age: 72
End: 2022-11-17
Payer: COMMERCIAL

## 2022-11-17 ENCOUNTER — PATIENT MESSAGE (OUTPATIENT)
Dept: FAMILY MEDICINE | Facility: CLINIC | Age: 72
End: 2022-11-17

## 2022-11-17 DIAGNOSIS — I43 CARDIOMYOPATHY DUE TO HYPERTENSION, WITH HEART FAILURE: ICD-10-CM

## 2022-11-17 DIAGNOSIS — M25.40 JOINT SWELLING: Primary | ICD-10-CM

## 2022-11-17 DIAGNOSIS — N18.4 STAGE 4 CHRONIC KIDNEY DISEASE: ICD-10-CM

## 2022-11-17 DIAGNOSIS — I48.21 PERMANENT ATRIAL FIBRILLATION: ICD-10-CM

## 2022-11-17 DIAGNOSIS — E11.22 CKD STAGE 3 DUE TO TYPE 2 DIABETES MELLITUS: ICD-10-CM

## 2022-11-17 DIAGNOSIS — J44.9 CHRONIC OBSTRUCTIVE PULMONARY DISEASE, UNSPECIFIED COPD TYPE: ICD-10-CM

## 2022-11-17 DIAGNOSIS — E11.3593 TYPE 2 DIABETES MELLITUS WITH BOTH EYES AFFECTED BY PROLIFERATIVE RETINOPATHY WITHOUT MACULAR EDEMA, WITH LONG-TERM CURRENT USE OF INSULIN: ICD-10-CM

## 2022-11-17 DIAGNOSIS — Z79.4 TYPE 2 DIABETES MELLITUS WITH BOTH EYES AFFECTED BY PROLIFERATIVE RETINOPATHY WITHOUT MACULAR EDEMA, WITH LONG-TERM CURRENT USE OF INSULIN: ICD-10-CM

## 2022-11-17 DIAGNOSIS — C73 THYROID CANCER: ICD-10-CM

## 2022-11-17 DIAGNOSIS — N18.30 CKD STAGE 3 DUE TO TYPE 2 DIABETES MELLITUS: ICD-10-CM

## 2022-11-17 DIAGNOSIS — N25.81 SECONDARY HYPERPARATHYROIDISM OF RENAL ORIGIN: ICD-10-CM

## 2022-11-17 DIAGNOSIS — F32.1 CURRENT MODERATE EPISODE OF MAJOR DEPRESSIVE DISORDER WITHOUT PRIOR EPISODE: ICD-10-CM

## 2022-11-17 DIAGNOSIS — I25.119 ATHEROSCLEROSIS OF NATIVE CORONARY ARTERY OF NATIVE HEART WITH ANGINA PECTORIS: ICD-10-CM

## 2022-11-17 DIAGNOSIS — E66.01 MORBID OBESITY WITH BMI OF 40.0-44.9, ADULT: ICD-10-CM

## 2022-11-17 DIAGNOSIS — I11.0 CARDIOMYOPATHY DUE TO HYPERTENSION, WITH HEART FAILURE: ICD-10-CM

## 2022-11-17 PROCEDURE — 99213 PR OFFICE/OUTPT VISIT, EST, LEVL III, 20-29 MIN: ICD-10-PCS | Mod: 95,,, | Performed by: FAMILY MEDICINE

## 2022-11-17 PROCEDURE — 99213 OFFICE O/P EST LOW 20 MIN: CPT | Mod: 95,,, | Performed by: FAMILY MEDICINE

## 2022-11-17 RX ORDER — SULFAMETHOXAZOLE AND TRIMETHOPRIM 800; 160 MG/1; MG/1
1 TABLET ORAL 2 TIMES DAILY
Qty: 14 TABLET | Refills: 0 | Status: SHIPPED | OUTPATIENT
Start: 2022-11-17 | End: 2023-01-03

## 2022-11-17 RX ORDER — DICLOFENAC SODIUM 10 MG/G
2 GEL TOPICAL 4 TIMES DAILY
Qty: 100 G | Refills: 1 | Status: SHIPPED | OUTPATIENT
Start: 2022-11-17

## 2022-11-17 NOTE — LETTER
This communication is flagged as high priority.      December 12, 2022    Alfredo Brennan  [address]  [place] [address]             [facility]  Family Medicine  [address] [facility] [place] [address]  Phone: [phone]  Fax: [phone]   December 12, 2022     Patient: Alfredo Brennan   YOB: 1950   Date of Visit: 11/17/2022     Dear Sirs:  I am responding to a request for information regarding my patient's risk of various disorders as a result of previous exposure to Dioxin.  As per your letter, you requested for me to provide literature that supports my opinion that Dioxin exposure has some relation to the patient's currently diagnosed conditions.    First, to clarify, he does in fact have the diseases that you listed:  Renal Disease  History of basal cell carcinoma  COPD  Sleep apnea  History of thyroid cancer      RENAL DISEASE  In this article, The effects of environmental chemicals on renal function - [facility] [facility] (nih.gov), a correlation between exposure to dioxin and renal disease was found.   it specifically states the following:   Estimated GFR  Moderate-to-high level exposure to dioxins is associated with decreased kidney function (Table 5). A cross-sectional study of 1,531 healthy adults living in close proximity to a pentachlorophenyl factory that was no longer in use found a strong monotonic inverse association between PCDD exposure and estimated GFR.94 Compared to the lowest quartile, the highest quartile of toxin exposure resulted in a 14.8 ml/min/1.73 m2 and a 21.5 ml/min/1.73 m2 reduction in estimated GFR in men and women, respectively.93        Table 1 in this article graphically shows the effects including increased albuminuria, decreased GFR, increased blood pressure and increased uric acid concentration:  Associations between environmental toxins and cardiorenal disease  Chemical class Albuminuria eGFR Blood pressure Serum uric acid concentration   Phthalates ?  No studies ? No studies   Bisphenol A (BPA) ? ? ? No studies   Perfluoroalkyl acids (PFAAs) No studies ? ? ?   Dioxins ? ? ? ?   Polycyclic aromatic hydrocarbons (PAHs) No studies Unknown No studies No studies   Polychlorinated biphenyls (PCBs) ? ? ? ?   Open in a separate window  Abbreviation: eGFR, estimated glomerular filtration rate.    BASAL CELL CANCER  In this article, Hyperpigmentation and higher incidence of cutaneous malignancies in moderate-high PCB- and dioxin exposed individuals - PubMed (nih.gov), a correlation between exposure to dioxin and skin cancer was found.    We performed a dermatological examination on 92 former workers from a transformer recycling company with known elevated serum PCB and/or dioxin (polychlorinated dibenzo-p-dioxin/polychlorinated dhxdejr-x-lhagi (PCDD/F)) levels. In addition, we performed a skin cancer screening over a period of seven years (0320-3268) on resp. 268, 271, 210, 149, 92, 129 and 79 participants. We found a higher incidence of acne and malignancies of the skin (malignant melanoma, basal cell carcinoma and mycosis fungoides) in the workers compared to normal population.     COPD  In this article, Agent Orange exposure and disease prevalence in [place] veterans: the [place] veterans health study - PubMed (nih.gov), the following association between exposure to DIOXIN and COPD was found:  After adjusting for covariates, the high exposure group had modestly elevated odds ratios (ORs) for endocrine diseases combined and neurologic diseases combined. The adjusted ORs were significantly higher in the high exposure group than in the low exposure group for hypothyroidism (OR=1.13), autoimmune thyroiditis (OR=1.93), diabetes mellitus (OR=1.04), other endocrine gland disorders including pituitary gland disorders (OR=1.43), amyloidosis (OR=3.02), systemic atrophies affecting the nervous system including spinal muscular atrophy (OR=1.27), Alzheimer disease (OR=1.64),  peripheral polyneuropathies (OR=1.09), angina pectoris (OR=1.04), stroke (OR=1.09), chronic obstructive pulmonary diseases (COPD) including chronic bronchitis (OR=1.05) and bronchiectasis (OR=1.16), asthma (OR=1.04), peptic ulcer (OR=1.03), and liver cirrhosis (OR=1.08). In conclusion, Agent Orange exposure increased the prevalence of endocrine disorders, especially in the thyroid and pituitary gland; various neurologic diseases; COPD; and liver cirrhosis. Overall, this study suggests that Agent Orange/2,4-D/TCDD exposure several decades earlier may increase morbidity from various diseases, some of which have rarely been explored in previous epidemiologic studies.    DIABETES  Diabetes has been a big problem for this patient as it has also played a part in his underlying cardiac and renal problems.  In this article, Serum dioxin, insulin, fasting glucose, and sex hormone-binding globulin in veterans of Operation  - PubMed (nih.gov), they demonstrated a compensatory metabolic relationship between dioxin and insulin regulation.      SLEEP APNEA  While I do not know of literature specifically on the relation of Dioxin on sleep apnea, sleep apnea is caused by obesity.  Based on this study, Diabetes and Toxicant Exposure - PubMed (nih.gov), it is reasonable to assume that my patient has had an increased risk of obesity due to his exposure to Dioxin in the past.  This gives an indirect probability that his sleep apnea has some cause based on his exposure because of the presence of his obesity.  In the study, they state: The worldwide prevalence of obesity has near tripled between 1975 and 2016. Diabetes was the direct cause of an estimated 1.6 million deaths in 2015. Diabetogens, otherwise known as toxicants that cause insulin resistance in animal models and humans as a result of pancreatic ?-cell damage include the persistent organochlorine pesticides trans-nonachlor, oxychlordane, and DDE -the main metabolite  of DDT, as well as another class of persistent organic pollutants, polychlorinated biphenyls (PCBs). Other toxicants that are now considered diabetogens: BPA, arsenic, phthalates, perfluorinates (PFOS), diethyl hexyl phthalate (DEHP), and dioxin (TCDD) are commonly found in the blood and urine in the [facility] populations and presumed to also be commonly found in the [place] population as a whole. A review of the literature on the risk for diabetes in epidemiologic studies considering these toxicants, challenges for clinicians using lab testing for these diabetogens, and the necessary interventions for lowering body burden of persistent toxicants are discussed.    Thyroid cancer  In this study, HIGH PREVALENCE OF AGENT ORANGE EXPOSURE AMONG THYROID CANCER PATIENTS IN THE [facility] SYSTEM - PubMed (nih.gov), there appears to be a link between exposure and thyroid cancer.  Results: There were 19,592 patients (86% men, 76% white, 58% , 42% [place]) in the [facility] system with a diagnosis of thyroid cancer within this 14-year study period. The gender-stratified prevalence rates of thyroid cancer among the [place] population during the study period were 1:1,114 (women) and 1:1,023 (men), which were lower for women but similar for men, when compared to the [place] general population in 2011 (1:350 for women and 1:1,219 for men). There was a significantly higher proportion of self-reported Agent Orange exposure among thyroid cancer patients (10.0%), compared to the general [facility] population (6.2%) (P<.0001).  Conclusion: Thyroid cancer patients, in this sample, have a higher prevalence of self-reported Agent Orange exposure compared to the overall national [facility] patient population.    Please consider the above literature supporting his case.      Sincerely,         Ángel Colon MD

## 2022-12-12 NOTE — TELEPHONE ENCOUNTER
I printed the letter for him.  Let him pick it up.  There is also one in [employer] that you can probably send to him.

## 2022-12-19 ENCOUNTER — TELEPHONE (OUTPATIENT)
Dept: FAMILY MEDICINE | Facility: CLINIC | Age: 72
End: 2022-12-19
Payer: COMMERCIAL

## 2022-12-23 ENCOUNTER — PATIENT MESSAGE (OUTPATIENT)
Dept: FAMILY MEDICINE | Facility: CLINIC | Age: 72
End: 2022-12-23
Payer: COMMERCIAL

## 2023-01-02 PROBLEM — F32.1 CURRENT MODERATE EPISODE OF MAJOR DEPRESSIVE DISORDER WITHOUT PRIOR EPISODE: Status: ACTIVE | Noted: 2023-01-02

## 2023-01-02 NOTE — PROGRESS NOTES
Primary Care Telemedicine Note   The patient location is:  Patient Home    The chief complaint leading to consultation is: finger problem  Total time spent with patient: 10 min   Visit type: Virtual visit with synchronous audio only and video   Each patient to whom he or she provides medical services by telemedicine is:  (1) informed of the relationship between the physician and patient and the respective role of any other health care provider with respect to management of the patient; and (2) notified that he or she may decline to receive medical services by telemedicine and may withdraw from such care at any time.       CC:As Above   HPI:   He has had a swelling of the finger for a couple of months.    Onset of symptoms was about 4-6 weeks ago.  States that it has gotten worse in the last week.  Denies injury or trauma to area. The location of the pain is the right middle finger.  He has gotten 2 rounds of laser heat therapy done by his chiropractor to the finger, states that that made it better, but it got worse again.  Pain is 7/10.  He states that he can not extend it because it is so swollen.   He was seen in the ortho clinic yesterday and they asked him to f/u with me on the [place]      US Soft Tissue Upper Extremity, Right  Order: [id number]  Status: Final result     Visible to patient: Yes (seen)     Next appt: None     Dx: Swelling of middle finger     0 Result Notes  Details    Reading Physician Reading Date Result Priority   Rafat Hartmann MD  [phone] 11/16/2022 Routine     Narrative & Impression  EXAMINATION:  US SOFT TISSUE, UPPER EXTREMITY, RIGHT     CLINICAL HISTORY:  right dorsal middle finger nodule;  Other specified soft tissue disorders     TECHNIQUE:  Ultrasound of the right middle finger with grayscale and color Doppler technique was utilized.     COMPARISON:  Hand radiographs from 11/03/2022.     FINDINGS:  Ultra along the dorsal aspect of the right middle finger was performed.  Heterogeneous  appearance of the subcutaneous soft tissues with swelling/edema noted.  Mild increased vascularity also present in this region.  Findings could be seen with inflammation/infection.  Correlation recommended.  No drainable fluid collections.  No discrete mass is seen.  Further evaluation or follow-up as appropriate.     Impression:     Please see above        Electronically signed by: Rafat Hartmann MD  Date:                                            11/16/2022  Time:                                           15:10       X-Ray Hand Complete Right  Order: [id number]  Status: Final result     Visible to patient: Yes (seen)     Next appt: None     Dx: Right hand pain     0 Result Notes  Details    Reading Physician Reading Date Result Priority   Xochilt Castaneda MD  [phone]  [phone] 11/3/2022      Narrative & Impression  EXAMINATION:  XR HAND COMPLETE 3 VIEW RIGHT     CLINICAL HISTORY:  Pain in right hand     TECHNIQUE:  PA, lateral, and oblique views of the right hand were performed.     COMPARISON:  None     FINDINGS:  No acute, displaced fracture.  No aggressive osseous abnormality.  No dislocation.  Mild joint space narrowing and subchondral sclerosis at the radiocarpal joint.  Joint space narrowing and mild osteophytosis at the 1st carpometacarpal and metacarpophalangeal joints.  Vascular calcifications.     Impression:     Multifocal degenerative change including at the radiocarpal joint, 1st CMC and IP joints.        Electronically signed by: Xochilt Castaneda  Date:                                            11/03/2022  Time:                                           16:06     Other issues reviewed in the chart but no changes are being made now but are considered as part of eval is below:  Problem List Items Addressed This Visit       Cardiomyopathy due to hypertension, with heart failure (Chronic)    Morbid obesity with BMI of 40.0-44.9, adult (Chronic)    Overview     The patient presents with obesity.   Denies bulimia, amenorrhea, cold intolerance, edema, hip pain, hirsutism, knee pain, polydipsia, polyuria, thirst and weakness.  The patient does not perform regular exercise.  Previous treatments for obesity :self-directed dieting with success.  The patient and I discussed the importance of exercise.    Wt Readings from Last 4 Encounters:   07/20/22 116.1 kg (256 lb)   01/19/22 120.7 kg (266 lb 1.6 oz)   01/14/22 121.1 kg (267 lb)   01/27/21 123.4 kg (272 lb)              Atherosclerosis of native coronary artery of native heart with angina pectoris    Overview     The patient presents with coronary artery disease.  Denies chest pain, shortness of breath, left arm or neck pain, diaphoresis, nausea, vomiting, palpitations, paroxysmal nocturnal dyspnea, orthopnea, claudication or decreased exercise tolerance.  The patient reports excellent compliance.  Current treatment has included medications that are listed in medication list.  The cannot identify any exacerbating factors.             CKD stage 3 due to type 2 diabetes mellitus    Overview       He has CKD and he is followed by Dr. Kendall on this.  He has never had dialysis.    He had a CREAT of 1.8 on Aniket 10 2022.          Current moderate episode of major depressive disorder without prior episode    Permanent atrial fibrillation    Secondary hyperparathyroidism of renal origin    Stage 4 chronic kidney disease    Overview     The patient has an Estimated Glumerular Filtration Rate (EGFR) below:   CrCl cannot be calculated (Patient's most recent lab result is older than the maximum 7 days allowed.).  It has improved a little with the GFR.  eGFR if non    Date Value Ref Range Status   12/30/2021 35.0 (L) 60.0 mL/min/1.73 m2 Final       Lab Results   Component Value Date    CREATININE 1.87 (H) 12/30/2021    BUN 27 (H) 12/30/2021     12/30/2021    K 3.7 12/30/2021    CL 97 (L) 12/30/2021    CO2 30 12/30/2021     The patient's chronic kidney  disease was monitored, evaluated, addressed and/or treated.             Thyroid cancer    Overview     The [facility]         Type 2 diabetes mellitus with both eyes affected by proliferative retinopathy without macular edema, with long-term current use of insulin    Overview     The patient presents with diabetes.  The patient denies polyuria, polydipsia, polyphagia, hypoglycemia and paresthesias.  The patient's glucose control has been good.  Home glucose averages are routinely checked.  The patient is without retinopathy currently.  The patient has no history of neuropathy.  The patient currently complains of no podiatric problems.  The patient has excellent compliance.  He had an a1c of 9.9 on 1/12/2022.  He is only taking lantus.  He was asked by his endocrinologist to take novolog and levimir.  He has not started either.            Other Visit Diagnoses       Joint swelling    -  Primary    Chronic obstructive pulmonary disease, unspecified COPD type                    The patient's Health Maintenance was reviewed and the following appears to be due at this time:   Health Maintenance Due   Topic Date Due    Shingles Vaccine (1 of 2) 02/29/2016    COVID-19 Vaccine (4 - Booster) 04/05/2021    Foot Exam  01/27/2022    Colorectal Cancer Screening  02/01/2022    Influenza Vaccine (1) 09/01/2022    Hemoglobin A1c  11/18/2022          Outpatient Medications Prior to Visit   Medication Sig Dispense Refill    albuterol (PROVENTIL/VENTOLIN HFA) 90 mcg/actuation inhaler Inhale 2 puffs into the lungs every 6 (six) hours as needed for Wheezing. 18 g 1    allopurinoL (ZYLOPRIM) 100 MG tablet       alogliptin 6.25 mg Tab Take 6.25 mg by mouth daily as needed.      atorvastatin (LIPITOR) 80 MG tablet Take 1 tablet (80 mg total) by mouth once daily. 90 tablet 1    BREEZE 2 TEST STRIPS Strp USE ONE STRIP TO CHECK GLUCOSE 3 TO 4 TIMES DAILY AS NEEDED 100 strip 11    docusate sodium  "(COLACE) 100 MG capsule 3 (three) times daily as needed. Every day      famotidine (PEPCID) 20 MG tablet 2 (two) times daily. Every day      folic acid (FOLVITE) 1 MG tablet       furosemide (LASIX) 40 MG tablet       guaiFENesin (MUCINEX) 600 mg 12 hr tablet Take 600 mg by mouth.      insulin aspart protamine-insulin aspart (NOVOLOG 70/30) 100 unit/mL (70-30) InPn pen Inject 5 Units into the skin Daily.      insulin detemir U-100 (LEVEMIR FLEXTOUCH U-100 INSULN) 100 unit/mL (3 mL) InPn pen 48 Units 2 (two) times daily.      LANCETS & BLOOD GLUCOSE STRIPS MISC qid      losartan (COZAAR) 25 MG tablet Take 25 mg by mouth.      metOLazone (ZAROXOLYN) 5 MG tablet Take 1 tablet (5 mg total) by mouth daily as needed. 90 tablet 1    metoprolol succinate (TOPROL-XL) 25 MG 24 hr tablet Take 25 mg by mouth.      nitroGLYCERIN (NITROSTAT) 0.4 MG SL tablet Place 0.4 mg under the tongue every 5 (five) minutes as needed for Chest pain.      pen needle, diabetic (PEN NEEDLE) 31 gauge x 5/16" Ndle USE ONE SYRINGE EVERY  each 3    phenazopyridine HCl (AZO ORAL) Take by mouth as needed.      potassium chloride (MICRO-K) 10 MEQ CpSR Take 2 capsules (20 mEq total) by mouth 3 (three) times daily. 540 capsule 3    PRADAXA 75 mg Cap 2 (two) times a day.      rivaroxaban (XARELTO) 15 mg Tab Take 15 mg by mouth once daily.       sertraline (ZOLOFT) 50 MG tablet Take 1 tablet (50 mg total) by mouth once daily. 90 tablet 3    sildenafil (VIAGRA) 100 MG tablet Take 50 mg by mouth.      SYNTHROID 175 mcg tablet Take 1 tablet by mouth once daily at 6am.      tamsulosin (FLOMAX) 0.4 mg Cap       sulfamethoxazole-trimethoprim 800-160mg (BACTRIM DS) 800-160 mg Tab Take 1 tablet by mouth 2 (two) times daily. 14 tablet 0     Facility-Administered Medications Prior to Visit   Medication Dose Route Frequency Provider Last Rate Last Admin    lidocaine HCL 10 mg/ml (1%) injection 1 mL  1 mL Intramuscular Once Henri Singh MD         "     PMH:  Past Medical History:   Diagnosis Date    Atrial fibrillation     CAD (coronary artery disease)     CKD stage 3 due to type 2 diabetes mellitus 1/4/2018    Lab Results Component Value Date  CREATININE 2.04 (H) 01/03/2018  BUN 22 01/03/2018   01/03/2018  K 4.2 01/03/2018   01/03/2018  CO2 30 01/03/2018  BMP Lab Results Component Value Date   01/03/2018  K 4.2 01/03/2018   01/03/2018  CO2 30 01/03/2018  BUN 22 01/03/2018  CREATININE 2.04 (H) 01/03/2018  CALCIUM 8.7 01/03/2018  ANIONGAP 8 01/27/2015  ESTGFRAFRICA 38 (L) 01/03/201    Diabetes mellitus     Diabetes mellitus type II     Elevated PSA     History of colon polyps 1/8/2018    He has had colon polyps in the past and he is screened serially for this.     Hyperlipidemia associated with type 2 diabetes mellitus 7/10/2012    Hyperlipidemia LDL goal < 70     Hypertension     Hypertension goal BP (blood pressure) < 130/80     Mass of adrenal gland     Mild acid reflux     Prostate cancer     Skin cancer     Skin cancer (melanoma)      Past Surgical History:   Procedure Laterality Date    CARDIAC CATHETERIZATION  02/2015    CARDIOVERSION      CHOLECYSTECTOMY      COLONOSCOPY N/A 02/01/2019    Procedure: COLONOSCOPY-obtain cardiology clearance;  Surgeon: Rashid Engel III, MD;  Location: [facility];  Service: Endoscopy;  Laterality: N/A;    CORONARY ARTERY BYPASS GRAFT      EYE SURGERY      mohs      PROSTATE SURGERY      radiation prostate      SKIN CANCER EXCISION      TONSILLECTOMY      TOTAL THYROIDECTOMY      removed due to cancer.    TRANSURETHRAL RESECTION OF PROSTATE       Review of patient's allergies indicates:   Allergen Reactions    Codeine      Other reaction(s): Hallucinations    Fish oil Rash and Itching     Current Outpatient Medications on File Prior to Visit   Medication Sig Dispense Refill    albuterol (PROVENTIL/VENTOLIN HFA) 90 mcg/actuation inhaler Inhale 2 puffs into the lungs every 6 (six) hours as needed for  "Wheezing. 18 g 1    allopurinoL (ZYLOPRIM) 100 MG tablet       alogliptin 6.25 mg Tab Take 6.25 mg by mouth daily as needed.      atorvastatin (LIPITOR) 80 MG tablet Take 1 tablet (80 mg total) by mouth once daily. 90 tablet 1    BREEZE 2 TEST STRIPS Str USE ONE STRIP TO CHECK GLUCOSE 3 TO 4 TIMES DAILY AS NEEDED 100 strip 11    docusate sodium (COLACE) 100 MG capsule 3 (three) times daily as needed. Every day      famotidine (PEPCID) 20 MG tablet 2 (two) times daily. Every day      folic acid (FOLVITE) 1 MG tablet       furosemide (LASIX) 40 MG tablet       guaiFENesin (MUCINEX) 600 mg 12 hr tablet Take 600 mg by mouth.      insulin aspart protamine-insulin aspart (NOVOLOG 70/30) 100 unit/mL (70-30) InPn pen Inject 5 Units into the skin Daily.      insulin detemir U-100 (LEVEMIR FLEXTOUCH U-100 INSULN) 100 unit/mL (3 mL) InPn pen 48 Units 2 (two) times daily.      LANCETS & BLOOD GLUCOSE STRIPS MISC qid      losartan (COZAAR) 25 MG tablet Take 25 mg by mouth.      metOLazone (ZAROXOLYN) 5 MG tablet Take 1 tablet (5 mg total) by mouth daily as needed. 90 tablet 1    metoprolol succinate (TOPROL-XL) 25 MG 24 hr tablet Take 25 mg by mouth.      nitroGLYCERIN (NITROSTAT) 0.4 MG SL tablet Place 0.4 mg under the tongue every 5 (five) minutes as needed for Chest pain.      pen needle, diabetic (PEN NEEDLE) 31 gauge x 5/16" Ndle USE ONE SYRINGE EVERY  each 3    phenazopyridine HCl (AZO ORAL) Take by mouth as needed.      potassium chloride (MICRO-K) 10 MEQ CpSR Take 2 capsules (20 mEq total) by mouth 3 (three) times daily. 540 capsule 3    PRADAXA 75 mg Cap 2 (two) times a day.      rivaroxaban (XARELTO) 15 mg Tab Take 15 mg by mouth once daily.       sertraline (ZOLOFT) 50 MG tablet Take 1 tablet (50 mg total) by mouth once daily. 90 tablet 3    sildenafil (VIAGRA) 100 MG tablet Take 50 mg by mouth.      SYNTHROID 175 mcg tablet Take 1 tablet by mouth once daily at 6am.      tamsulosin (FLOMAX) 0.4 mg Cap    "     Current Facility-Administered Medications on File Prior to Visit   Medication Dose Route Frequency Provider Last Rate Last Admin    lidocaine HCL 10 mg/ml (1%) injection 1 mL  1 mL Intramuscular Once Henri Singh MD         Social History     Socioeconomic History    Marital status:    Tobacco Use    Smoking status: Never    Smokeless tobacco: Never   Substance and Sexual Activity    Alcohol use: Yes     Comment: very rare    Drug use: No    Sexual activity: Not Currently     Partners: Female     Comment: once monthly     Family History   Problem Relation Age of Onset    Stroke Mother     Hypertension Mother     Heart disease Father     Coronary artery disease Father     Diabetes Father     Heart disease Paternal Aunt     Stroke Maternal Grandmother        Review of Systems   HENT:  Negative for hearing loss.    Eyes:  Negative for discharge.   Respiratory:  Negative for wheezing.    Cardiovascular:  Negative for chest pain and palpitations.   Gastrointestinal:  Negative for blood in stool, constipation, diarrhea and vomiting.   Genitourinary:  Negative for hematuria and urgency.   Musculoskeletal:  Negative for neck pain.   Neurological:  Negative for weakness and headaches.   Endo/Heme/Allergies:  Negative for polydipsia.      Physical Exam    (Vitals taken at home and reported to us and documented)   Pulse If Self Reported:No flowsheet data found.     Last 5 Patient Entered Readings                Current 30 Day Average:   Recent Readings        SBP (mmHg)        DBP (mmHg)        Pulse                     BP If Self Reported:No flowsheet data found.   Pulse Readings from Last 3 Encounters:   07/20/22 66   01/19/22 66   01/14/22 68      Weight If Self Reported:No flowsheet data found.   Glucose If Self Reported:No flowsheet data found.   Last 5 Patient Entered Readings                                          Most Recent A1c:     There is no flowsheet data to display.        There were no vitals  taken for this visit. if verbally reported and entered.    Constitutional: The patient is oriented to person, place, and time and appears well-developed and well-nourished with no distress.    Eyes: EOM are normal.    Pulmonary/Chest: Effort normal. No respiratory distress.    Neurological: The patient is alert and oriented to person, place, and time.    Skin: the patient is not diaphoretic.    Psychiatric: The patient has a normal mood and affect. The behavior is normal. Judgment, thought and content normal.        DIAGNOSIS  Encounter Diagnoses   Name Primary?    Joint swelling Yes    Current moderate episode of major depressive disorder without prior episode     Thyroid cancer     CKD stage 3 due to type 2 diabetes mellitus     Morbid obesity with BMI of 40.0-44.9, adult     Secondary hyperparathyroidism of renal origin     Cardiomyopathy due to hypertension, with heart failure     Atherosclerosis of native coronary artery of native heart with angina pectoris     Permanent atrial fibrillation     Chronic obstructive pulmonary disease, unspecified COPD type     Type 2 diabetes mellitus with both eyes affected by proliferative retinopathy without macular edema, with long-term current use of insulin     Stage 4 chronic kidney disease           PLAN:   start bactrim and diclofenac.  I am having Alfredo Brennan start on diclofenac sodium. I am also having him maintain his famotidine, LANCETS & BLOOD GLUCOSE STRIPS MISC, docusate sodium, nitroGLYCERIN, BREEZE 2 TEST STRIPS, (pen needle, diabetic), guaiFENesin, sildenafiL, rivaroxaban, albuterol, metOLazone, losartan, phenazopyridine HCl (AZO ORAL), PRADAXA, metoprolol succinate, atorvastatin, allopurinoL, alogliptin, insulin aspart protamine-insulin aspart, SYNTHROID, LEVEMIR FLEXTOUCH U-100 INSULN, folic acid, furosemide, tamsulosin, potassium chloride, sertraline, and sulfamethoxazole-trimethoprim 800-160mg. We will continue to administer LIDOcaine HCL 10 mg/ml  (1%).  No problem-specific Assessment & Plan notes found for this encounter.      No follow-ups on file.    Diagnoses and all orders for this visit:    Joint swelling    Current moderate episode of major depressive disorder without prior episode    Thyroid cancer    CKD stage 3 due to type 2 diabetes mellitus    Morbid obesity with BMI of 40.0-44.9, adult    Secondary hyperparathyroidism of renal origin    Cardiomyopathy due to hypertension, with heart failure    Atherosclerosis of native coronary artery of native heart with angina pectoris    Permanent atrial fibrillation    Chronic obstructive pulmonary disease, unspecified COPD type    Type 2 diabetes mellitus with both eyes affected by proliferative retinopathy without macular edema, with long-term current use of insulin    Stage 4 chronic kidney disease    Other orders  -     sulfamethoxazole-trimethoprim 800-160mg (BACTRIM DS) 800-160 mg Tab; Take 1 tablet by mouth 2 (two) times daily.  -     diclofenac sodium (VOLTAREN) 1 % Gel; Apply 2 g topically 4 (four) times daily.      Medications Ordered This Encounter   Medications    diclofenac sodium (VOLTAREN) 1 % Gel     Sig: Apply 2 g topically 4 (four) times daily.     Dispense:  100 g     Refill:  1    sulfamethoxazole-trimethoprim 800-160mg (BACTRIM DS) 800-160 mg Tab     Sig: Take 1 tablet by mouth 2 (two) times daily.     Dispense:  14 tablet     Refill:  0     The patient was instructed to stop the following meds:  Medications Discontinued During This Encounter   Medication Reason    sulfamethoxazole-trimethoprim 800-160mg (BACTRIM DS) 800-160 mg Tab Reorder     No orders of the defined types were placed in this encounter.      Medication List with Changes/Refills   New Medications    DICLOFENAC SODIUM (VOLTAREN) 1 % GEL    Apply 2 g topically 4 (four) times daily.   Current Medications    ALBUTEROL (PROVENTIL/VENTOLIN HFA) 90 MCG/ACTUATION INHALER    Inhale 2 puffs into the lungs every 6 (six) hours as  "needed for Wheezing.    ALLOPURINOL (ZYLOPRIM) 100 MG TABLET        ALOGLIPTIN 6.25 MG TAB    Take 6.25 mg by mouth daily as needed.    ATORVASTATIN (LIPITOR) 80 MG TABLET    Take 1 tablet (80 mg total) by mouth once daily.    BREEZE 2 TEST STRIPS STRP    USE ONE STRIP TO CHECK GLUCOSE 3 TO 4 TIMES DAILY AS NEEDED    DOCUSATE SODIUM (COLACE) 100 MG CAPSULE    3 (three) times daily as needed. Every day    FAMOTIDINE (PEPCID) 20 MG TABLET    2 (two) times daily. Every day    FOLIC ACID (FOLVITE) 1 MG TABLET        FUROSEMIDE (LASIX) 40 MG TABLET        GUAIFENESIN (MUCINEX) 600 MG 12 HR TABLET    Take 600 mg by mouth.    INSULIN ASPART PROTAMINE-INSULIN ASPART (NOVOLOG 70/30) 100 UNIT/ML (70-30) INPN PEN    Inject 5 Units into the skin Daily.    INSULIN DETEMIR U-100 (LEVEMIR FLEXTOUCH U-100 INSULN) 100 UNIT/ML (3 ML) INPN PEN    48 Units 2 (two) times daily.    LANCETS & BLOOD GLUCOSE STRIPS MISC    qid    LOSARTAN (COZAAR) 25 MG TABLET    Take 25 mg by mouth.    METOLAZONE (ZAROXOLYN) 5 MG TABLET    Take 1 tablet (5 mg total) by mouth daily as needed.    METOPROLOL SUCCINATE (TOPROL-XL) 25 MG 24 HR TABLET    Take 25 mg by mouth.    NITROGLYCERIN (NITROSTAT) 0.4 MG SL TABLET    Place 0.4 mg under the tongue every 5 (five) minutes as needed for Chest pain.    PEN NEEDLE, DIABETIC (PEN NEEDLE) 31 GAUGE X 5/16" NDLE    USE ONE SYRINGE EVERY DAY    PHENAZOPYRIDINE HCL (AZO ORAL)    Take by mouth as needed.    POTASSIUM CHLORIDE (MICRO-K) 10 MEQ CPSR    Take 2 capsules (20 mEq total) by mouth 3 (three) times daily.    PRADAXA 75 MG CAP    2 (two) times a day.    RIVAROXABAN (XARELTO) 15 MG TAB    Take 15 mg by mouth once daily.     SERTRALINE (ZOLOFT) 50 MG TABLET    Take 1 tablet (50 mg total) by mouth once daily.    SILDENAFIL (VIAGRA) 100 MG TABLET    Take 50 mg by mouth.    SYNTHROID 175 MCG TABLET    Take 1 tablet by mouth once daily at 6am.    TAMSULOSIN (FLOMAX) 0.4 MG CAP       Changed and/or Refilled Medications "    Modified Medication Previous Medication    SULFAMETHOXAZOLE-TRIMETHOPRIM 800-160MG (BACTRIM DS) 800-160 MG TAB sulfamethoxazole-trimethoprim 800-160mg (BACTRIM DS) 800-160 mg Tab       Take 1 tablet by mouth 2 (two) times daily.    Take 1 tablet by mouth 2 (two) times daily.      Medication List with Changes/Refills   New Medications    DICLOFENAC SODIUM (VOLTAREN) 1 % GEL    Apply 2 g topically 4 (four) times daily.       Start Date: 11/17/2022End Date: --   Current Medications    ALBUTEROL (PROVENTIL/VENTOLIN HFA) 90 MCG/ACTUATION INHALER    Inhale 2 puffs into the lungs every 6 (six) hours as needed for Wheezing.       Start Date: 1/28/2019 End Date: --    ALLOPURINOL (ZYLOPRIM) 100 MG TABLET           Start Date: 10/8/2021 End Date: --    ALOGLIPTIN 6.25 MG TAB    Take 6.25 mg by mouth daily as needed.       Start Date: --        End Date: --    ATORVASTATIN (LIPITOR) 80 MG TABLET    Take 1 tablet (80 mg total) by mouth once daily.       Start Date: 1/27/2021 End Date: --    BREEZE 2 TEST STRIPS STRP    USE ONE STRIP TO CHECK GLUCOSE 3 TO 4 TIMES DAILY AS NEEDED       Start Date: 6/20/2016 End Date: --    DOCUSATE SODIUM (COLACE) 100 MG CAPSULE    3 (three) times daily as needed. Every day       Start Date: 11/1/2011 End Date: --    FAMOTIDINE (PEPCID) 20 MG TABLET    2 (two) times daily. Every day       Start Date: 11/1/2011 End Date: --    FOLIC ACID (FOLVITE) 1 MG TABLET           Start Date: 6/15/2022 End Date: --    FUROSEMIDE (LASIX) 40 MG TABLET           Start Date: 6/16/2022 End Date: --    GUAIFENESIN (MUCINEX) 600 MG 12 HR TABLET    Take 600 mg by mouth.       Start Date: --        End Date: --    INSULIN ASPART PROTAMINE-INSULIN ASPART (NOVOLOG 70/30) 100 UNIT/ML (70-30) INPN PEN    Inject 5 Units into the skin Daily.       Start Date: --        End Date: --    INSULIN DETEMIR U-100 (LEVEMIR FLEXTOUCH U-100 INSULN) 100 UNIT/ML (3 ML) INPN PEN    48 Units 2 (two) times daily.       Start Date:  "7/5/2022  End Date: --    LANCETS & BLOOD GLUCOSE STRIPS MISC    qid       Start Date: 11/1/2011 End Date: --    LOSARTAN (COZAAR) 25 MG TABLET    Take 25 mg by mouth.       Start Date: 3/19/2019 End Date: --    METOLAZONE (ZAROXOLYN) 5 MG TABLET    Take 1 tablet (5 mg total) by mouth daily as needed.       Start Date: 1/28/2019 End Date: --    METOPROLOL SUCCINATE (TOPROL-XL) 25 MG 24 HR TABLET    Take 25 mg by mouth.       Start Date: 7/1/2020  End Date: --    NITROGLYCERIN (NITROSTAT) 0.4 MG SL TABLET    Place 0.4 mg under the tongue every 5 (five) minutes as needed for Chest pain.       Start Date: --        End Date: --    PEN NEEDLE, DIABETIC (PEN NEEDLE) 31 GAUGE X 5/16" NDLE    USE ONE SYRINGE EVERY DAY       Start Date: 1/8/2018  End Date: --    PHENAZOPYRIDINE HCL (AZO ORAL)    Take by mouth as needed.       Start Date: --        End Date: --    POTASSIUM CHLORIDE (MICRO-K) 10 MEQ CPSR    Take 2 capsules (20 mEq total) by mouth 3 (three) times daily.       Start Date: 7/20/2022 End Date: --    PRADAXA 75 MG CAP    2 (two) times a day.       Start Date: 1/12/2021 End Date: --    RIVAROXABAN (XARELTO) 15 MG TAB    Take 15 mg by mouth once daily.        Start Date: 7/30/2018 End Date: --    SERTRALINE (ZOLOFT) 50 MG TABLET    Take 1 tablet (50 mg total) by mouth once daily.       Start Date: 7/21/2022 End Date: --    SILDENAFIL (VIAGRA) 100 MG TABLET    Take 50 mg by mouth.       Start Date: 2/27/2018 End Date: --    SYNTHROID 175 MCG TABLET    Take 1 tablet by mouth once daily at 6am.       Start Date: 4/26/2022 End Date: --    TAMSULOSIN (FLOMAX) 0.4 MG CAP           Start Date: 6/17/2022 End Date: --   Changed and/or Refilled Medications    Modified Medication Previous Medication    SULFAMETHOXAZOLE-TRIMETHOPRIM 800-160MG (BACTRIM DS) 800-160 MG TAB sulfamethoxazole-trimethoprim 800-160mg (BACTRIM DS) 800-160 mg Tab       Take 1 tablet by mouth 2 (two) times daily.    Take 1 tablet by mouth 2 (two) times " daily.       Start Date: 11/17/2022End Date: --    Start Date: 11/3/2022 End Date: 11/17/2022                            Answers submitted by the patient for this visit:  Review of Systems Questionnaire (Submitted on 11/17/2022)  activity change: Yes  unexpected weight change: No  rhinorrhea: No  trouble swallowing: No  visual disturbance: No  chest tightness: No  polyuria: No  difficulty urinating: No  joint swelling: Yes  arthralgias: Yes  confusion: No  dysphoric mood: No

## 2023-01-03 ENCOUNTER — PATIENT MESSAGE (OUTPATIENT)
Dept: FAMILY MEDICINE | Facility: CLINIC | Age: 73
End: 2023-01-03

## 2023-01-03 ENCOUNTER — OFFICE VISIT (OUTPATIENT)
Dept: FAMILY MEDICINE | Facility: CLINIC | Age: 73
End: 2023-01-03
Payer: COMMERCIAL

## 2023-01-03 DIAGNOSIS — M10.441 OTHER SECONDARY ACUTE GOUT OF RIGHT HAND: Primary | ICD-10-CM

## 2023-01-03 PROCEDURE — 99213 OFFICE O/P EST LOW 20 MIN: CPT | Mod: 95,,, | Performed by: FAMILY MEDICINE

## 2023-01-03 PROCEDURE — 99213 PR OFFICE/OUTPT VISIT, EST, LEVL III, 20-29 MIN: ICD-10-PCS | Mod: 95,,, | Performed by: FAMILY MEDICINE

## 2023-01-03 RX ORDER — COLCHICINE 0.6 MG/1
TABLET ORAL
Qty: 7 TABLET | Refills: 0 | Status: SHIPPED | OUTPATIENT
Start: 2023-01-03

## 2023-01-03 RX ORDER — METHYLPREDNISOLONE 4 MG/1
TABLET ORAL
Qty: 21 TABLET | Refills: 0 | Status: SHIPPED | OUTPATIENT
Start: 2023-01-03

## 2023-01-03 NOTE — TELEPHONE ENCOUNTER
Detail Level: Generalized Schedule a virtual appt at 4:20 w me.   Quality 224: Stage 0-Iic Melanoma: Overutilization Of Imaging Studies For Only Stage 0-Iic Melanoma: None of the following diagnostic imaging studies ordered: chest X-ray, CT, Ultrasound, MRI, PET, or nuclear medicine scans (ML) Detail Level: Detailed Quality 137: Melanoma: Continuity Of Care - Recall System: Patient information entered into a recall system that includes: target date for the next exam specified AND a process to follow up with patients regarding missed or unscheduled appointments

## 2023-01-25 ENCOUNTER — PATIENT OUTREACH (OUTPATIENT)
Dept: ADMINISTRATIVE | Facility: HOSPITAL | Age: 73
End: 2023-01-25
Payer: COMMERCIAL

## 2023-01-25 ENCOUNTER — PATIENT MESSAGE (OUTPATIENT)
Dept: ADMINISTRATIVE | Facility: HOSPITAL | Age: 73
End: 2023-01-25
Payer: COMMERCIAL

## 2023-01-25 NOTE — LETTER
AUTHORIZATION FOR RELEASE OF   CONFIDENTIAL INFORMATION    [facility],    We are seeing Alfredo Brennan, date of birth 1950, in the clinic at [facility]. Ángel Colon MD is the patient's PCP. Alfredo Brennan has an outstanding Hemoglobin A1c at the time we reviewed his chart. In order to help keep his health information updated, he has authorized us to request the following medical record(s):        (  )  MAMMOGRAM                                      (  )  COLONOSCOPY      (  )  PAP SMEAR                                          ( X )  OUTSIDE LAB RESULTS     (  )  DEXA SCAN                                          (  )  EYE EXAM            (  )  FOOT EXAM                                          (  )  ENTIRE RECORD     (  )  OUTSIDE IMMUNIZATIONS                 (X  )  HA1c         Please fax records to [facility], [facility] [phone]     If you have any questions, please contact Kae DWYER SHELBY [phone].           Patient Name: Alfredo Brennan  : 1950  Patient Phone #: [phone]       Xiomara PINEDA [facility]  Care Coordination Department  [facility]

## 2023-02-16 ENCOUNTER — PATIENT MESSAGE (OUTPATIENT)
Dept: ADMINISTRATIVE | Facility: HOSPITAL | Age: 73
End: 2023-02-16
Payer: COMMERCIAL

## 2023-03-15 ENCOUNTER — TELEPHONE (OUTPATIENT)
Dept: FAMILY MEDICINE | Facility: CLINIC | Age: 73
End: 2023-03-15
Payer: COMMERCIAL

## 2023-03-15 RX ORDER — HYDROCORTISONE 25 MG/G
CREAM TOPICAL 2 TIMES DAILY
Qty: 38 G | Refills: 3 | Status: SHIPPED | OUTPATIENT
Start: 2023-03-15

## 2023-03-15 NOTE — TELEPHONE ENCOUNTER
Pt's wife sent message: Please make sure this gets to Dr Neal.  My , Alfredo's hemorrhoids are causing severe pain and discomfort.  He needs help.  He uses Preparation H constantly.  Nothing is helping.  Please advise.  Thank you, Paip

## 2023-03-15 NOTE — TELEPHONE ENCOUNTER
Bedside shift change report given to Darron Ugalde RN (oncoming nurse) by Kimberly Killian RN (offgoing nurse). Report included the following information SBAR. I have signed for the following orders AND/OR meds.  Please call the patient and ask the patient to schedule the testing AND/OR inform about any medications that were sent.      No orders of the defined types were placed in this encounter.      Medications Ordered This Encounter   Medications    hydrocortisone 2.5 % cream     Sig: Apply topically 2 (two) times daily.     Dispense:  38 g     Refill:  3

## 2023-03-16 NOTE — TELEPHONE ENCOUNTER
Patients wife informed.     [facility] outs[facility] and [facility] will make an appointment with Dr. Bee to address Hemorid.

## 2023-03-22 DIAGNOSIS — E11.9 TYPE 2 DIABETES MELLITUS WITHOUT COMPLICATION: ICD-10-CM

## 2023-04-01 NOTE — PROGRESS NOTES
Subjective:      Patient ID: Alfredo Brennan is a 73 y.o. male.    Chief Complaint: gouth  He has had gout for a few weeks of the right hand. Nothing makes it better or worse.  No swelling.  No trauma.  Problem List Items Addressed This Visit    None  Visit Diagnoses       Other secondary acute gout of right hand    -  Primary    Relevant Medications    colchicine (COLCRYS) 0.6 mg tablet    methylPREDNISolone (MEDROL DOSEPACK) 4 mg tablet            The patient's Health Maintenance was reviewed and the following appears to be due:   Health Maintenance Due   Topic Date Due    Shingles Vaccine (1 of 2) 02/29/2016    COVID-19 Vaccine (4 - Booster) 04/05/2021    Foot Exam  01/27/2022    Colorectal Cancer Screening  02/01/2022    Influenza Vaccine (1) 09/01/2022    Lipid Panel  03/09/2023       Past Medical History:  Past Medical History:   Diagnosis Date    Atrial fibrillation     CAD (coronary artery disease)     CKD stage 3 due to type 2 diabetes mellitus 1/4/2018    Lab Results Component Value Date  CREATININE 2.04 (H) 01/03/2018  BUN 22 01/03/2018   01/03/2018  K 4.2 01/03/2018   01/03/2018  CO2 30 01/03/2018  BMP Lab Results Component Value Date   01/03/2018  K 4.2 01/03/2018   01/03/2018  CO2 30 01/03/2018  BUN 22 01/03/2018  CREATININE 2.04 (H) 01/03/2018  CALCIUM 8.7 01/03/2018  ANIONGAP 8 01/27/2015  ESTGFRAFRICA 38 (L) 01/03/201    Diabetes mellitus     Diabetes mellitus type II     Elevated PSA     History of colon polyps 1/8/2018    He has had colon polyps in the past and he is screened serially for this.     Hyperlipidemia associated with type 2 diabetes mellitus 7/10/2012    Hyperlipidemia LDL goal < 70     Hypertension     Hypertension goal BP (blood pressure) < 130/80     Mass of adrenal gland     Mild acid reflux     Prostate cancer     Skin cancer     Skin cancer (melanoma)      Past Surgical History:   Procedure Laterality Date    CARDIAC CATHETERIZATION  02/2015     CARDIOVERSION      CHOLECYSTECTOMY      COLONOSCOPY N/A 02/01/2019    Procedure: COLONOSCOPY-obtain cardiology clearance;  Surgeon: Rashid Engel III, MD;  Location: [facility];  Service: Endoscopy;  Laterality: N/A;    CORONARY ARTERY BYPASS GRAFT      EYE SURGERY      mohs      PROSTATE SURGERY      radiation prostate      SKIN CANCER EXCISION      TONSILLECTOMY      TOTAL THYROIDECTOMY      removed due to cancer.    TRANSURETHRAL RESECTION OF PROSTATE       Review of patient's allergies indicates:   Allergen Reactions    Codeine      Other reaction(s): Hallucinations    Fish oil Rash and Itching     Current Outpatient Medications on File Prior to Visit   Medication Sig Dispense Refill    albuterol (PROVENTIL/VENTOLIN HFA) 90 mcg/actuation inhaler Inhale 2 puffs into the lungs every 6 (six) hours as needed for Wheezing. 18 g 1    allopurinoL (ZYLOPRIM) 100 MG tablet       alogliptin 6.25 mg Tab Take 6.25 mg by mouth daily as needed.      atorvastatin (LIPITOR) 80 MG tablet Take 1 tablet (80 mg total) by mouth once daily. 90 tablet 1    BREEZE 2 TEST STRIPS Strp USE ONE STRIP TO CHECK GLUCOSE 3 TO 4 TIMES DAILY AS NEEDED 100 strip 11    diclofenac sodium (VOLTAREN) 1 % Gel Apply 2 g topically 4 (four) times daily. 100 g 1    docusate sodium (COLACE) 100 MG capsule 3 (three) times daily as needed. Every day      famotidine (PEPCID) 20 MG tablet 2 (two) times daily. Every day      folic acid (FOLVITE) 1 MG tablet       furosemide (LASIX) 40 MG tablet       guaiFENesin (MUCINEX) 600 mg 12 hr tablet Take 600 mg by mouth.      insulin aspart protamine-insulin aspart (NOVOLOG 70/30) 100 unit/mL (70-30) InPn pen Inject 5 Units into the skin Daily.      insulin detemir U-100 (LEVEMIR FLEXTOUCH U-100 INSULN) 100 unit/mL (3 mL) InPn pen 48 Units 2 (two) times daily.      LANCETS & BLOOD GLUCOSE STRIPS MISC qid      losartan (COZAAR) 25 MG tablet Take 25 mg by mouth.      metOLazone (ZAROXOLYN) 5 MG tablet Take 1 tablet (5  "mg total) by mouth daily as needed. 90 tablet 1    metoprolol succinate (TOPROL-XL) 25 MG 24 hr tablet Take 25 mg by mouth.      nitroGLYCERIN (NITROSTAT) 0.4 MG SL tablet Place 0.4 mg under the tongue every 5 (five) minutes as needed for Chest pain.      pen needle, diabetic (PEN NEEDLE) 31 gauge x 5/16" Ndle USE ONE SYRINGE EVERY  each 3    phenazopyridine HCl (AZO ORAL) Take by mouth as needed.      potassium chloride (MICRO-K) 10 MEQ CpSR Take 2 capsules (20 mEq total) by mouth 3 (three) times daily. 540 capsule 3    PRADAXA 75 mg Cap 2 (two) times a day.      sertraline (ZOLOFT) 50 MG tablet Take 1 tablet (50 mg total) by mouth once daily. 90 tablet 3    sildenafil (VIAGRA) 100 MG tablet Take 50 mg by mouth.      SYNTHROID 175 mcg tablet Take 1 tablet by mouth once daily at 6am.      tamsulosin (FLOMAX) 0.4 mg Cap        Current Facility-Administered Medications on File Prior to Visit   Medication Dose Route Frequency Provider Last Rate Last Admin    lidocaine HCL 10 mg/ml (1%) injection 1 mL  1 mL Intramuscular Once Henri Singh MD         Social History     Socioeconomic History    Marital status:    Tobacco Use    Smoking status: Never    Smokeless tobacco: Never   Substance and Sexual Activity    Alcohol use: Yes     Comment: very rare    Drug use: No    Sexual activity: Not Currently     Partners: Female     Comment: once monthly     Family History   Problem Relation Age of Onset    Stroke Mother     Hypertension Mother     Heart disease Father     Coronary artery disease Father     Diabetes Father     Heart disease Paternal Aunt     Stroke Maternal Grandmother        Review of Systems   Constitutional:  Negative for activity change and unexpected weight change.   HENT:  Negative for hearing loss, rhinorrhea and trouble swallowing.    Eyes:  Negative for discharge and visual disturbance.   Respiratory:  Negative for chest tightness and wheezing.    Cardiovascular:  Negative for chest " pain and palpitations.   Gastrointestinal:  Negative for blood in stool, constipation, diarrhea and vomiting.   Endocrine: Negative for polydipsia and polyuria.   Genitourinary:  Negative for difficulty urinating, hematuria and urgency.   Musculoskeletal:  Positive for arthralgias and joint swelling. Negative for neck pain.   Neurological:  Negative for weakness and headaches.   Psychiatric/Behavioral:  Negative for confusion and dysphoric mood.      Objective:   There were no vitals taken for this visit.    Physical Exam  Constitutional:       Appearance: He is well-developed.   Cardiovascular:      Rate and Rhythm: Normal rate and regular rhythm.      Heart sounds: Normal heart sounds. No murmur heard.    No friction rub. No gallop.   Pulmonary:      Effort: Pulmonary effort is normal. No respiratory distress.      Breath sounds: Normal breath sounds. No wheezing or rales.   Musculoskeletal:      Comments: He has pain of the wrist of the right hand. No redness or warmth noted.     Assessment:     1. Other secondary acute gout of right hand      Plan:   I am having Alfredo Brennan start on colchicine and methylPREDNISolone. I am also having him maintain his famotidine, LANCETS & BLOOD GLUCOSE STRIPS MISC, docusate sodium, nitroGLYCERIN, BREEZE 2 TEST STRIPS, (pen needle, diabetic), guaiFENesin, sildenafiL, albuterol, metOLazone, losartan, phenazopyridine HCl (AZO ORAL), PRADAXA, metoprolol succinate, atorvastatin, allopurinoL, alogliptin, insulin aspart protamine-insulin aspart, SYNTHROID, LEVEMIR FLEXTOUCH U-100 INSULN, folic acid, furosemide, tamsulosin, potassium chloride, sertraline, and diclofenac sodium. We will continue to administer LIDOcaine HCL 10 mg/ml (1%).  No problem-specific Assessment & Plan notes found for this encounter.      No follow-ups on file.    Diagnoses and all orders for this visit:    Other secondary acute gout of right hand  -     colchicine (COLCRYS) 0.6 mg tablet; Take 2 now and then  repeat using 1 pill twice a day for 3 days.  -     methylPREDNISolone (MEDROL DOSEPACK) 4 mg tablet; follow package directions      Medications Ordered This Encounter   Medications    colchicine (COLCRYS) 0.6 mg tablet     Sig: Take 2 now and then repeat using 1 pill twice a day for 3 days.     Dispense:  7 tablet     Refill:  0    methylPREDNISolone (MEDROL DOSEPACK) 4 mg tablet     Sig: follow package directions     Dispense:  21 tablet     Refill:  0     The patient was instructed to stop the following meds:  Medications Discontinued During This Encounter   Medication Reason    sulfamethoxazole-trimethoprim 800-160mg (BACTRIM DS) 800-160 mg Tab Patient no longer taking    rivaroxaban (XARELTO) 15 mg Tab Patient no longer taking     No orders of the defined types were placed in this encounter.      Medication List with Changes/Refills   New Medications    COLCHICINE (COLCRYS) 0.6 MG TABLET    Take 2 now and then repeat using 1 pill twice a day for 3 days.    HYDROCORTISONE 2.5 % CREAM    Apply topically 2 (two) times daily.    METHYLPREDNISOLONE (MEDROL DOSEPACK) 4 MG TABLET    follow package directions   Current Medications    ALBUTEROL (PROVENTIL/VENTOLIN HFA) 90 MCG/ACTUATION INHALER    Inhale 2 puffs into the lungs every 6 (six) hours as needed for Wheezing.    ALLOPURINOL (ZYLOPRIM) 100 MG TABLET        ALOGLIPTIN 6.25 MG TAB    Take 6.25 mg by mouth daily as needed.    ATORVASTATIN (LIPITOR) 80 MG TABLET    Take 1 tablet (80 mg total) by mouth once daily.    BREEZE 2 TEST STRIPS STRP    USE ONE STRIP TO CHECK GLUCOSE 3 TO 4 TIMES DAILY AS NEEDED    DICLOFENAC SODIUM (VOLTAREN) 1 % GEL    Apply 2 g topically 4 (four) times daily.    DOCUSATE SODIUM (COLACE) 100 MG CAPSULE    3 (three) times daily as needed. Every day    FAMOTIDINE (PEPCID) 20 MG TABLET    2 (two) times daily. Every day    FOLIC ACID (FOLVITE) 1 MG TABLET        FUROSEMIDE (LASIX) 40 MG TABLET        GUAIFENESIN (MUCINEX) 600 MG 12 HR TABLET    " Take 600 mg by mouth.    INSULIN ASPART PROTAMINE-INSULIN ASPART (NOVOLOG 70/30) 100 UNIT/ML (70-30) INPN PEN    Inject 5 Units into the skin Daily.    INSULIN DETEMIR U-100 (LEVEMIR FLEXTOUCH U-100 INSULN) 100 UNIT/ML (3 ML) INPN PEN    48 Units 2 (two) times daily.    LANCETS & BLOOD GLUCOSE STRIPS MISC    qid    LOSARTAN (COZAAR) 25 MG TABLET    Take 25 mg by mouth.    METOLAZONE (ZAROXOLYN) 5 MG TABLET    Take 1 tablet (5 mg total) by mouth daily as needed.    METOPROLOL SUCCINATE (TOPROL-XL) 25 MG 24 HR TABLET    Take 25 mg by mouth.    NITROGLYCERIN (NITROSTAT) 0.4 MG SL TABLET    Place 0.4 mg under the tongue every 5 (five) minutes as needed for Chest pain.    PEN NEEDLE, DIABETIC (PEN NEEDLE) 31 GAUGE X 5/16" NDLE    USE ONE SYRINGE EVERY DAY    PHENAZOPYRIDINE HCL (AZO ORAL)    Take by mouth as needed.    POTASSIUM CHLORIDE (MICRO-K) 10 MEQ CPSR    Take 2 capsules (20 mEq total) by mouth 3 (three) times daily.    PRADAXA 75 MG CAP    2 (two) times a day.    SERTRALINE (ZOLOFT) 50 MG TABLET    Take 1 tablet (50 mg total) by mouth once daily.    SILDENAFIL (VIAGRA) 100 MG TABLET    Take 50 mg by mouth.    SYNTHROID 175 MCG TABLET    Take 1 tablet by mouth once daily at 6am.    TAMSULOSIN (FLOMAX) 0.4 MG CAP       Discontinued Medications    RIVAROXABAN (XARELTO) 15 MG TAB    Take 15 mg by mouth once daily.     SULFAMETHOXAZOLE-TRIMETHOPRIM 800-160MG (BACTRIM DS) 800-160 MG TAB    Take 1 tablet by mouth 2 (two) times daily.      Medication List with Changes/Refills   New Medications    COLCHICINE (COLCRYS) 0.6 MG TABLET    Take 2 now and then repeat using 1 pill twice a day for 3 days.       Start Date: 1/3/2023  End Date: --    HYDROCORTISONE 2.5 % CREAM    Apply topically 2 (two) times daily.       Start Date: 3/15/2023 End Date: --    METHYLPREDNISOLONE (MEDROL DOSEPACK) 4 MG TABLET    follow package directions       Start Date: 1/3/2023  End Date: --   Current Medications    ALBUTEROL (PROVENTIL/VENTOLIN " HFA) 90 MCG/ACTUATION INHALER    Inhale 2 puffs into the lungs every 6 (six) hours as needed for Wheezing.       Start Date: 1/28/2019 End Date: --    ALLOPURINOL (ZYLOPRIM) 100 MG TABLET           Start Date: 10/8/2021 End Date: --    ALOGLIPTIN 6.25 MG TAB    Take 6.25 mg by mouth daily as needed.       Start Date: --        End Date: --    ATORVASTATIN (LIPITOR) 80 MG TABLET    Take 1 tablet (80 mg total) by mouth once daily.       Start Date: 1/27/2021 End Date: --    BREEZE 2 TEST STRIPS STRP    USE ONE STRIP TO CHECK GLUCOSE 3 TO 4 TIMES DAILY AS NEEDED       Start Date: 6/20/2016 End Date: --    DICLOFENAC SODIUM (VOLTAREN) 1 % GEL    Apply 2 g topically 4 (four) times daily.       Start Date: 11/17/2022End Date: --    DOCUSATE SODIUM (COLACE) 100 MG CAPSULE    3 (three) times daily as needed. Every day       Start Date: 11/1/2011 End Date: --    FAMOTIDINE (PEPCID) 20 MG TABLET    2 (two) times daily. Every day       Start Date: 11/1/2011 End Date: --    FOLIC ACID (FOLVITE) 1 MG TABLET           Start Date: 6/15/2022 End Date: --    FUROSEMIDE (LASIX) 40 MG TABLET           Start Date: 6/16/2022 End Date: --    GUAIFENESIN (MUCINEX) 600 MG 12 HR TABLET    Take 600 mg by mouth.       Start Date: --        End Date: --    INSULIN ASPART PROTAMINE-INSULIN ASPART (NOVOLOG 70/30) 100 UNIT/ML (70-30) INPN PEN    Inject 5 Units into the skin Daily.       Start Date: --        End Date: --    INSULIN DETEMIR U-100 (LEVEMIR FLEXTOUCH U-100 INSULN) 100 UNIT/ML (3 ML) INPN PEN    48 Units 2 (two) times daily.       Start Date: 7/5/2022  End Date: --    LANCETS & BLOOD GLUCOSE STRIPS MISC    qid       Start Date: 11/1/2011 End Date: --    LOSARTAN (COZAAR) 25 MG TABLET    Take 25 mg by mouth.       Start Date: 3/19/2019 End Date: --    METOLAZONE (ZAROXOLYN) 5 MG TABLET    Take 1 tablet (5 mg total) by mouth daily as needed.       Start Date: 1/28/2019 End Date: --    METOPROLOL SUCCINATE (TOPROL-XL) 25 MG 24 HR TABLET  "   Take 25 mg by mouth.       Start Date: 7/1/2020  End Date: --    NITROGLYCERIN (NITROSTAT) 0.4 MG SL TABLET    Place 0.4 mg under the tongue every 5 (five) minutes as needed for Chest pain.       Start Date: --        End Date: --    PEN NEEDLE, DIABETIC (PEN NEEDLE) 31 GAUGE X 5/16" NDLE    USE ONE SYRINGE EVERY DAY       Start Date: 1/8/2018  End Date: --    PHENAZOPYRIDINE HCL (AZO ORAL)    Take by mouth as needed.       Start Date: --        End Date: --    POTASSIUM CHLORIDE (MICRO-K) 10 MEQ CPSR    Take 2 capsules (20 mEq total) by mouth 3 (three) times daily.       Start Date: 7/20/2022 End Date: --    PRADAXA 75 MG CAP    2 (two) times a day.       Start Date: 1/12/2021 End Date: --    SERTRALINE (ZOLOFT) 50 MG TABLET    Take 1 tablet (50 mg total) by mouth once daily.       Start Date: 7/21/2022 End Date: --    SILDENAFIL (VIAGRA) 100 MG TABLET    Take 50 mg by mouth.       Start Date: 2/27/2018 End Date: --    SYNTHROID 175 MCG TABLET    Take 1 tablet by mouth once daily at 6am.       Start Date: 4/26/2022 End Date: --    TAMSULOSIN (FLOMAX) 0.4 MG CAP           Start Date: 6/17/2022 End Date: --   Discontinued Medications    RIVAROXABAN (XARELTO) 15 MG TAB    Take 15 mg by mouth once daily.        Start Date: 7/30/2018 End Date: 1/3/2023    SULFAMETHOXAZOLE-TRIMETHOPRIM 800-160MG (BACTRIM DS) 800-160 MG TAB    Take 1 tablet by mouth 2 (two) times daily.       Start Date: 11/17/2022End Date: 1/3/2023                "

## 2023-04-13 ENCOUNTER — PATIENT OUTREACH (OUTPATIENT)
Dept: ADMINISTRATIVE | Facility: HOSPITAL | Age: 73
End: 2023-04-13
Payer: COMMERCIAL

## 2023-04-13 NOTE — LETTER
AUTHORIZATION FOR RELEASE OF   CONFIDENTIAL INFORMATION    Dear Dr Natan Whitlock/Medical Records,    We are seeing Alfredo Brennan, date of birth 1950, in the clinic at [facility]. Ángel Colon MD is the patient's PCP. Alfredo Brennan has an outstanding lHA1C at the time we reviewed his chart. In order to help keep his health information updated, he has authorized us to request the following medical record(s):        (  )  MAMMOGRAM                                      (  )  COLONOSCOPY      (  )  PAP SMEAR                                          (  X)  OUTSIDE LAB RESULTS     (  )  DEXA SCAN                                          (  )  EYE EXAM            (  )  FOOT EXAM                                          (  )  ENTIRE RECORD     (  )  OUTSIDE IMMUNIZATIONS                 (X  )  HA1C    We have record of a claim that a Ha1c was done 2023, can you please fax us the result so we can update pt chart.         Please FAX records to [facility], Andra W [facility] [phone]     If you have any questions, please contact Xiomara PINEDA [facility]  [phone].           Patient Name: Alfredo Brennan  : 1950  Patient Phone #: [phone]     Thanks   Xiomara PINEDA [facility]  Care Coordination Department  [facility]

## 2023-04-13 NOTE — LETTER
AUTHORIZATION FOR RELEASE OF   CONFIDENTIAL INFORMATION    Dear [facility],    We are seeing Alfredo Brennan, date of birth 1950, in the clinic at [facility]. Ángel Colon MD is the patient's PCP. Alfredo Brennan has an outstanding HA1C at the time we reviewed his chart. In order to help keep his health information updated, he has authorized us to request the following medical record(s):        (  )  MAMMOGRAM                                      (  )  COLONOSCOPY      (  )  PAP SMEAR                                          (  )  OUTSIDE LAB RESULTS     (  )  DEXA SCAN                                          (  )  EYE EXAM            (  )  FOOT EXAM                                          (  )  ENTIRE RECORD     (  )  OUTSIDE IMMUNIZATIONS                 ( x )  Hemoglobin A1c         Please FAX records to [facility], [facility] [phone]     If you have any questions, please contact  [facility] [phone].           Patient Name: Alfredo Brennan  : 1950  Patient Phone #: [phone]     Thanks,    Xiomara PINEDA [facility]  Care Coordination Department  [facility]

## 2023-04-13 NOTE — PROGRESS NOTES
Lab Clean UP: Working report pt has external claim 01/18/2023 with Dr Natan Whitlock, not found in [facility] or [facility] will request record and set remind me x's 1 wk, pt also has external claim 01/04/23 from [facility] will also request.

## 2023-04-19 ENCOUNTER — PATIENT MESSAGE (OUTPATIENT)
Dept: ADMINISTRATIVE | Facility: HOSPITAL | Age: 73
End: 2023-04-19
Payer: COMMERCIAL

## 2023-04-28 NOTE — PROGRESS NOTES
Received Lipid panel and CBC from 03/2022, did not receive Ha1c, will attempt to [facility], left message for Dr FRANKLYN Whitlock, waiting return call.   Will set remind me for 1 wk.

## 2023-06-03 DIAGNOSIS — Z71.89 COMPLEX CARE COORDINATION: ICD-10-CM

## 2023-07-19 NOTE — PROGRESS NOTES
Clearance request faxed to Dr Whitlock for signature advising pt may hold Xarelto 2 days prior to procedure on 12/27/18.   Preparation Of Recipient Site - Flap Takedown: The eschar and granulation tissue was removed surgically with sharp dissection to facilitate appropriate healing after division and inset of the proximal and distal interpolation flap.

## 2023-08-25 LAB
LEFT EYE DM RETINOPATHY: POSITIVE
RIGHT EYE DM RETINOPATHY: POSITIVE

## 2023-08-30 ENCOUNTER — PATIENT OUTREACH (OUTPATIENT)
Dept: ADMINISTRATIVE | Facility: HOSPITAL | Age: 73
End: 2023-08-30
Payer: COMMERCIAL

## 2023-11-08 ENCOUNTER — PATIENT OUTREACH (OUTPATIENT)
Dept: ADMINISTRATIVE | Facility: HOSPITAL | Age: 73
End: 2023-11-08
Payer: COMMERCIAL

## 2023-11-08 NOTE — PROGRESS NOTES
Patients spouse returned call and states the patients home BP is 118/60. Patients spouse has requested appointment with PCP to discuss health issues, virtual visit has been scheduled 11/13/23.

## 2023-11-13 ENCOUNTER — OFFICE VISIT (OUTPATIENT)
Dept: FAMILY MEDICINE | Facility: CLINIC | Age: 73
End: 2023-11-13
Payer: COMMERCIAL

## 2023-11-13 DIAGNOSIS — E11.59 HYPERTENSION ASSOCIATED WITH DIABETES: ICD-10-CM

## 2023-11-13 DIAGNOSIS — G47.33 OSA ON CPAP: Primary | ICD-10-CM

## 2023-11-13 DIAGNOSIS — I15.2 HYPERTENSION ASSOCIATED WITH DIABETES: ICD-10-CM

## 2023-11-13 DIAGNOSIS — I48.21 PERMANENT ATRIAL FIBRILLATION: ICD-10-CM

## 2023-11-13 PROCEDURE — 99214 PR OFFICE/OUTPT VISIT, EST, LEVL IV, 30-39 MIN: ICD-10-PCS | Mod: 95,,, | Performed by: FAMILY MEDICINE

## 2023-11-13 PROCEDURE — 1159F PR MEDICATION LIST DOCUMENTED IN MEDICAL RECORD: ICD-10-PCS | Mod: CPTII,95,, | Performed by: FAMILY MEDICINE

## 2023-11-13 PROCEDURE — 1160F PR REVIEW ALL MEDS BY PRESCRIBER/CLIN PHARMACIST DOCUMENTED: ICD-10-PCS | Mod: CPTII,95,, | Performed by: FAMILY MEDICINE

## 2023-11-13 PROCEDURE — 1159F MED LIST DOCD IN RCRD: CPT | Mod: CPTII,95,, | Performed by: FAMILY MEDICINE

## 2023-11-13 PROCEDURE — 1160F RVW MEDS BY RX/DR IN RCRD: CPT | Mod: CPTII,95,, | Performed by: FAMILY MEDICINE

## 2023-11-13 PROCEDURE — 99214 OFFICE O/P EST MOD 30 MIN: CPT | Mod: 95,,, | Performed by: FAMILY MEDICINE

## 2023-11-13 NOTE — LETTER
November 22, 2023    Alfredo Brennan  [address]  [place] [address]             [facility]  Family Medicine  [address] [place] [address]  Phone: [phone]  Fax: [phone]   November 22, 2023     Patient: Alfredo Brennan   YOB: 1950   Date of Visit: 11/13/2023       To Whom it May Concern:    Alfredo Brennan was seen in my clinic on 11/13/2023.  I have been asked to write a letter in support of Alfredo's claim.  I am board-certified as a family practitioner.  My full credentials can be found below.    I have reviewed the 's [facility], service treatment records and subsequent medical records regarding his mental condition and his medical document detailing multiple pertinent events that occurred during his  service.  These documents include the fact that he has developed mental problems that are more likely than not to contribute to his sleep disorder.    The  has been my patient since around 2 decades ago.  I am continuing to treat him for his diagnosis of mental disorder along with his sleep disorder.  It is my professional opinion that his current diagnosis is more likely than not to be a direct result of his mental disorder which was induced by his service.  In my professional experience, mental disorders can induce sleep disorders in patients.  Please consider this when reviewing his case.     Reference:[email or web address]    Sincerely,           Ángel Colon MD

## 2023-11-13 NOTE — PROGRESS NOTES
Primary Care Telemedicine Note   The patient location is:  Patient Home    The chief complaint leading to consultation is:  Mental disorder and Sleep Disorders   Total time spent with patient:  30 minutes   Visit type: Virtual visit with synchronous audio only and video   Each patient to whom he or she provides medical services by telemedicine is:  (1) informed of the relationship between the physician and patient and the respective role of any other health care provider with respect to management of the patient; and (2) notified that he or she may decline to receive medical services by telemedicine and may withdraw from such care at any time.       CC:As Above   HPI: [facility]  He states that he is not getting credit for her CPAP use which he uses nightly for his SLEEP APNEA.  He had testing done and this showed that he had the sleep apnea.  He is doing well with his CPAP.  This has been secondary to the migraines.  What he needs is a NEXUS LETTER.   He has disability with migraines, tinnitus, and mental disorder (PTSD).  They call it mental disorder and he has been on sertraline for a year.    Problem List Items Addressed This Visit    None  ble    The patient's Health Maintenance was reviewed and the following appears to be due at this time:   Health Maintenance Due   Topic Date Due    RSV Vaccine (Age 60+) (1 - 1-dose 60+ series) Never done    Shingles Vaccine (1 of 2) 02/29/2016    Foot Exam  01/27/2022    Colorectal Cancer Screening  02/01/2022    Diabetes Urine Screening  07/20/2023    Influenza Vaccine (1) 09/01/2023    COVID-19 Vaccine (4 - 2023-24 season) 09/01/2023    Hemoglobin A1c  10/06/2023          Outpatient Medications Prior to Visit   Medication Sig Dispense Refill    albuterol (PROVENTIL/VENTOLIN HFA) 90 mcg/actuation inhaler Inhale 2 puffs into the lungs every 6 (six) hours as needed for Wheezing. 18 g 1    allopurinoL (ZYLOPRIM) 100 MG tablet        "alogliptin 6.25 mg Tab Take 6.25 mg by mouth daily as needed.      atorvastatin (LIPITOR) 80 MG tablet Take 1 tablet (80 mg total) by mouth once daily. 90 tablet 1    BREEZE 2 TEST STRIPS Strp USE ONE STRIP TO CHECK GLUCOSE 3 TO 4 TIMES DAILY AS NEEDED 100 strip 11    colchicine (COLCRYS) 0.6 mg tablet Take 2 now and then repeat using 1 pill twice a day for 3 days. 7 tablet 0    diclofenac sodium (VOLTAREN) 1 % Gel Apply 2 g topically 4 (four) times daily. 100 g 1    docusate sodium (COLACE) 100 MG capsule 3 (three) times daily as needed. Every day      famotidine (PEPCID) 20 MG tablet 2 (two) times daily. Every day      folic acid (FOLVITE) 1 MG tablet       furosemide (LASIX) 40 MG tablet       guaiFENesin (MUCINEX) 600 mg 12 hr tablet Take 600 mg by mouth.      hydrocortisone 2.5 % cream Apply topically 2 (two) times daily. 38 g 3    insulin aspart protamine-insulin aspart (NOVOLOG 70/30) 100 unit/mL (70-30) InPn pen Inject 5 Units into the skin Daily.      insulin detemir U-100 (LEVEMIR FLEXTOUCH U-100 INSULN) 100 unit/mL (3 mL) InPn pen 48 Units 2 (two) times daily.      LANCETS & BLOOD GLUCOSE STRIPS MISC qid      losartan (COZAAR) 25 MG tablet Take 25 mg by mouth.      methylPREDNISolone (MEDROL DOSEPACK) 4 mg tablet follow package directions 21 tablet 0    metOLazone (ZAROXOLYN) 5 MG tablet Take 1 tablet (5 mg total) by mouth daily as needed. 90 tablet 1    metoprolol succinate (TOPROL-XL) 25 MG 24 hr tablet Take 25 mg by mouth.      nitroGLYCERIN (NITROSTAT) 0.4 MG SL tablet Place 0.4 mg under the tongue every 5 (five) minutes as needed for Chest pain.      pen needle, diabetic (PEN NEEDLE) 31 gauge x 5/16" Ndle USE ONE SYRINGE EVERY  each 3    phenazopyridine HCl (AZO ORAL) Take by mouth as needed.      potassium chloride (MICRO-K) 10 MEQ CpSR Take 2 capsules (20 mEq total) by mouth 3 (three) times daily. 540 capsule 3    PRADAXA 75 mg Cap 2 (two) times a day.      sertraline (ZOLOFT) 50 MG tablet " Take 1 tablet (50 mg total) by mouth once daily. 90 tablet 3    sildenafil (VIAGRA) 100 MG tablet Take 50 mg by mouth.      SYNTHROID 175 mcg tablet Take 1 tablet by mouth once daily at 6am.      tamsulosin (FLOMAX) 0.4 mg Cap        Facility-Administered Medications Prior to Visit   Medication Dose Route Frequency Provider Last Rate Last Admin    lidocaine HCL 10 mg/ml (1%) injection 1 mL  1 mL Intramuscular Once Henri Singh MD             PMH:  Past Medical History:   Diagnosis Date    Atrial fibrillation     CAD (coronary artery disease)     CKD stage 3 due to type 2 diabetes mellitus 1/4/2018    Lab Results Component Value Date  CREATININE 2.04 (H) 01/03/2018  BUN 22 01/03/2018   01/03/2018  K 4.2 01/03/2018   01/03/2018  CO2 30 01/03/2018  BMP Lab Results Component Value Date   01/03/2018  K 4.2 01/03/2018   01/03/2018  CO2 30 01/03/2018  BUN 22 01/03/2018  CREATININE 2.04 (H) 01/03/2018  CALCIUM 8.7 01/03/2018  ANIONGAP 8 01/27/2015  ESTGFRAFRICA 38 (L) 01/03/201    Diabetes mellitus     Diabetes mellitus type II     Elevated PSA     History of colon polyps 1/8/2018    He has had colon polyps in the past and he is screened serially for this.     Hyperlipidemia associated with type 2 diabetes mellitus 7/10/2012    Hyperlipidemia LDL goal < 70     Hypertension     Hypertension goal BP (blood pressure) < 130/80     Mass of adrenal gland     Mild acid reflux     Prostate cancer     Skin cancer     Skin cancer (melanoma)      Past Surgical History:   Procedure Laterality Date    CARDIAC CATHETERIZATION  02/2015    CARDIOVERSION      CHOLECYSTECTOMY      COLONOSCOPY N/A 02/01/2019    Procedure: COLONOSCOPY-obtain cardiology clearance;  Surgeon: Rashid Engel III, MD;  Location: [facility];  Service: Endoscopy;  Laterality: N/A;    CORONARY ARTERY BYPASS GRAFT      EYE SURGERY      mohs      PROSTATE SURGERY      radiation prostate      SKIN CANCER EXCISION      TONSILLECTOMY       TOTAL THYROIDECTOMY      removed due to cancer.    TRANSURETHRAL RESECTION OF PROSTATE       Review of patient's allergies indicates:   Allergen Reactions    Codeine      Other reaction(s): Hallucinations    Fish oil Rash and Itching     Current Outpatient Medications on File Prior to Visit   Medication Sig Dispense Refill    albuterol (PROVENTIL/VENTOLIN HFA) 90 mcg/actuation inhaler Inhale 2 puffs into the lungs every 6 (six) hours as needed for Wheezing. 18 g 1    allopurinoL (ZYLOPRIM) 100 MG tablet       alogliptin 6.25 mg Tab Take 6.25 mg by mouth daily as needed.      atorvastatin (LIPITOR) 80 MG tablet Take 1 tablet (80 mg total) by mouth once daily. 90 tablet 1    BREEZE 2 TEST STRIPS Strp USE ONE STRIP TO CHECK GLUCOSE 3 TO 4 TIMES DAILY AS NEEDED 100 strip 11    colchicine (COLCRYS) 0.6 mg tablet Take 2 now and then repeat using 1 pill twice a day for 3 days. 7 tablet 0    diclofenac sodium (VOLTAREN) 1 % Gel Apply 2 g topically 4 (four) times daily. 100 g 1    docusate sodium (COLACE) 100 MG capsule 3 (three) times daily as needed. Every day      famotidine (PEPCID) 20 MG tablet 2 (two) times daily. Every day      folic acid (FOLVITE) 1 MG tablet       furosemide (LASIX) 40 MG tablet       guaiFENesin (MUCINEX) 600 mg 12 hr tablet Take 600 mg by mouth.      hydrocortisone 2.5 % cream Apply topically 2 (two) times daily. 38 g 3    insulin aspart protamine-insulin aspart (NOVOLOG 70/30) 100 unit/mL (70-30) InPn pen Inject 5 Units into the skin Daily.      insulin detemir U-100 (LEVEMIR FLEXTOUCH U-100 INSULN) 100 unit/mL (3 mL) InPn pen 48 Units 2 (two) times daily.      LANCETS & BLOOD GLUCOSE STRIPS MISC qid      losartan (COZAAR) 25 MG tablet Take 25 mg by mouth.      methylPREDNISolone (MEDROL DOSEPACK) 4 mg tablet follow package directions 21 tablet 0    metOLazone (ZAROXOLYN) 5 MG tablet Take 1 tablet (5 mg total) by mouth daily as needed. 90 tablet 1    metoprolol succinate (TOPROL-XL) 25 MG 24 hr  "tablet Take 25 mg by mouth.      nitroGLYCERIN (NITROSTAT) 0.4 MG SL tablet Place 0.4 mg under the tongue every 5 (five) minutes as needed for Chest pain.      pen needle, diabetic (PEN NEEDLE) 31 gauge x 5/16" Ndle USE ONE SYRINGE EVERY  each 3    phenazopyridine HCl (AZO ORAL) Take by mouth as needed.      potassium chloride (MICRO-K) 10 MEQ CpSR Take 2 capsules (20 mEq total) by mouth 3 (three) times daily. 540 capsule 3    PRADAXA 75 mg Cap 2 (two) times a day.      sertraline (ZOLOFT) 50 MG tablet Take 1 tablet (50 mg total) by mouth once daily. 90 tablet 3    sildenafil (VIAGRA) 100 MG tablet Take 50 mg by mouth.      SYNTHROID 175 mcg tablet Take 1 tablet by mouth once daily at 6am.      tamsulosin (FLOMAX) 0.4 mg Cap        Current Facility-Administered Medications on File Prior to Visit   Medication Dose Route Frequency Provider Last Rate Last Admin    lidocaine HCL 10 mg/ml (1%) injection 1 mL  1 mL Intramuscular Once Henri Singh MD         Social History     Socioeconomic History    Marital status:    Tobacco Use    Smoking status: Never    Smokeless tobacco: Never   Substance and Sexual Activity    Alcohol use: Yes     Comment: very rare    Drug use: No    Sexual activity: Not Currently     Partners: Female     Comment: once monthly     Family History   Problem Relation Age of Onset    Stroke Mother     Hypertension Mother     Heart disease Father     Coronary artery disease Father     Diabetes Father     Heart disease Paternal Aunt     Stroke Maternal Grandmother        Review of Systems   HENT:  Positive for hearing loss.    Eyes:  Negative for discharge.   Respiratory:  Negative for wheezing.    Cardiovascular:  Positive for chest pain. Negative for palpitations.   Gastrointestinal:  Negative for blood in stool, constipation, diarrhea and vomiting.   Genitourinary:  Negative for hematuria and urgency.   Musculoskeletal:  Negative for neck pain.   Neurological:  Positive for " weakness and headaches.   Endo/Heme/Allergies:  Negative for polydipsia.        Physical Exam    (Vitals taken at home and reported to us and documented)   Pulse If Self Reported:       No data to display                 Last 5 Patient Entered Readings                Current 30 Day Average:   Recent Readings        SBP (mmHg)        DBP (mmHg)        Pulse                     BP If Self Reported:       No data to display               Pulse Readings from Last 3 Encounters:   07/20/22 66   01/19/22 66   01/14/22 68      Weight If Self Reported:       No data to display               Glucose If Self Reported:       No data to display               Last 5 Patient Entered Readings                                          Most Recent A1c:            No data to display               There were no vitals taken for this visit. if verbally reported and entered.    Constitutional: The patient is oriented to person, place, and time and appears well-developed and well-nourished with no distress.    Eyes: EOM are normal.    Pulmonary/Chest: Effort normal. No respiratory distress.    Neurological: The patient is alert and oriented to person, place, and time.    Skin: the patient is not diaphoretic.    Psychiatric: The patient has a normal mood and affect. The behavior is normal. Judgment, thought and content normal.        DIAGNOSIS  No diagnosis found.       PLAN:     I am having Alfredo DESAIRoyce Mika maintain his famotidine, LANCETS & BLOOD GLUCOSE STRIPS MISC, docusate sodium, nitroGLYCERIN, BREEZE 2 TEST STRIPS, (pen needle, diabetic), guaiFENesin, sildenafiL, albuterol, metOLazone, losartan, phenazopyridine HCl (AZO ORAL), PRADAXA, metoprolol succinate, atorvastatin, allopurinoL, alogliptin, insulin aspart protamine-insulin aspart, SYNTHROID, LEVEMIR FLEXTOUCH U100 INSULIN, folic acid, furosemide, tamsulosin, potassium chloride, sertraline, diclofenac sodium, colchicine (gout), methylPREDNISolone, and hydrocortisone. We will  continue to administer LIDOcaine HCL 10 mg/ml (1%).  No problem-specific Assessment & Plan notes found for this encounter.      No follow-ups on file.    There are no diagnoses linked to this encounter.     The patient was instructed to stop the following meds:  There are no discontinued medications.  No orders of the defined types were placed in this encounter.      Medication List with Changes/Refills   Current Medications    ALBUTEROL (PROVENTIL/VENTOLIN HFA) 90 MCG/ACTUATION INHALER    Inhale 2 puffs into the lungs every 6 (six) hours as needed for Wheezing.    ALLOPURINOL (ZYLOPRIM) 100 MG TABLET        ALOGLIPTIN 6.25 MG TAB    Take 6.25 mg by mouth daily as needed.    ATORVASTATIN (LIPITOR) 80 MG TABLET    Take 1 tablet (80 mg total) by mouth once daily.    BREEZE 2 TEST STRIPS STRP    USE ONE STRIP TO CHECK GLUCOSE 3 TO 4 TIMES DAILY AS NEEDED    COLCHICINE (COLCRYS) 0.6 MG TABLET    Take 2 now and then repeat using 1 pill twice a day for 3 days.    DICLOFENAC SODIUM (VOLTAREN) 1 % GEL    Apply 2 g topically 4 (four) times daily.    DOCUSATE SODIUM (COLACE) 100 MG CAPSULE    3 (three) times daily as needed. Every day    FAMOTIDINE (PEPCID) 20 MG TABLET    2 (two) times daily. Every day    FOLIC ACID (FOLVITE) 1 MG TABLET        FUROSEMIDE (LASIX) 40 MG TABLET        GUAIFENESIN (MUCINEX) 600 MG 12 HR TABLET    Take 600 mg by mouth.    HYDROCORTISONE 2.5 % CREAM    Apply topically 2 (two) times daily.    INSULIN ASPART PROTAMINE-INSULIN ASPART (NOVOLOG 70/30) 100 UNIT/ML (70-30) INPN PEN    Inject 5 Units into the skin Daily.    INSULIN DETEMIR U-100 (LEVEMIR FLEXTOUCH U-100 INSULN) 100 UNIT/ML (3 ML) INPN PEN    48 Units 2 (two) times daily.    LANCETS & BLOOD GLUCOSE STRIPS MISC    qid    LOSARTAN (COZAAR) 25 MG TABLET    Take 25 mg by mouth.    METHYLPREDNISOLONE (MEDROL DOSEPACK) 4 MG TABLET    follow package directions    METOLAZONE (ZAROXOLYN) 5 MG TABLET    Take 1 tablet (5 mg total) by mouth daily as  "needed.    METOPROLOL SUCCINATE (TOPROL-XL) 25 MG 24 HR TABLET    Take 25 mg by mouth.    NITROGLYCERIN (NITROSTAT) 0.4 MG SL TABLET    Place 0.4 mg under the tongue every 5 (five) minutes as needed for Chest pain.    PEN NEEDLE, DIABETIC (PEN NEEDLE) 31 GAUGE X 5/16" NDLE    USE ONE SYRINGE EVERY DAY    PHENAZOPYRIDINE HCL (AZO ORAL)    Take by mouth as needed.    POTASSIUM CHLORIDE (MICRO-K) 10 MEQ CPSR    Take 2 capsules (20 mEq total) by mouth 3 (three) times daily.    PRADAXA 75 MG CAP    2 (two) times a day.    SERTRALINE (ZOLOFT) 50 MG TABLET    Take 1 tablet (50 mg total) by mouth once daily.    SILDENAFIL (VIAGRA) 100 MG TABLET    Take 50 mg by mouth.    SYNTHROID 175 MCG TABLET    Take 1 tablet by mouth once daily at 6am.    TAMSULOSIN (FLOMAX) 0.4 MG CAP          Medication List with Changes/Refills   Current Medications    ALBUTEROL (PROVENTIL/VENTOLIN HFA) 90 MCG/ACTUATION INHALER    Inhale 2 puffs into the lungs every 6 (six) hours as needed for Wheezing.       Start Date: 1/28/2019 End Date: --    ALLOPURINOL (ZYLOPRIM) 100 MG TABLET           Start Date: 10/8/2021 End Date: --    ALOGLIPTIN 6.25 MG TAB    Take 6.25 mg by mouth daily as needed.       Start Date: --        End Date: --    ATORVASTATIN (LIPITOR) 80 MG TABLET    Take 1 tablet (80 mg total) by mouth once daily.       Start Date: 1/27/2021 End Date: --    BREEZE 2 TEST STRIPS STRP    USE ONE STRIP TO CHECK GLUCOSE 3 TO 4 TIMES DAILY AS NEEDED       Start Date: 6/20/2016 End Date: --    COLCHICINE (COLCRYS) 0.6 MG TABLET    Take 2 now and then repeat using 1 pill twice a day for 3 days.       Start Date: 1/3/2023  End Date: --    DICLOFENAC SODIUM (VOLTAREN) 1 % GEL    Apply 2 g topically 4 (four) times daily.       Start Date: 11/17/2022End Date: --    DOCUSATE SODIUM (COLACE) 100 MG CAPSULE    3 (three) times daily as needed. Every day       Start Date: 11/1/2011 End Date: --    FAMOTIDINE (PEPCID) 20 MG TABLET    2 (two) times daily. " "Every day       Start Date: 11/1/2011 End Date: --    FOLIC ACID (FOLVITE) 1 MG TABLET           Start Date: 6/15/2022 End Date: --    FUROSEMIDE (LASIX) 40 MG TABLET           Start Date: 6/16/2022 End Date: --    GUAIFENESIN (MUCINEX) 600 MG 12 HR TABLET    Take 600 mg by mouth.       Start Date: --        End Date: --    HYDROCORTISONE 2.5 % CREAM    Apply topically 2 (two) times daily.       Start Date: 3/15/2023 End Date: --    INSULIN ASPART PROTAMINE-INSULIN ASPART (NOVOLOG 70/30) 100 UNIT/ML (70-30) INPN PEN    Inject 5 Units into the skin Daily.       Start Date: --        End Date: --    INSULIN DETEMIR U-100 (LEVEMIR FLEXTOUCH U-100 INSULN) 100 UNIT/ML (3 ML) INPN PEN    48 Units 2 (two) times daily.       Start Date: 7/5/2022  End Date: --    LANCETS & BLOOD GLUCOSE STRIPS MISC    qid       Start Date: 11/1/2011 End Date: --    LOSARTAN (COZAAR) 25 MG TABLET    Take 25 mg by mouth.       Start Date: 3/19/2019 End Date: --    METHYLPREDNISOLONE (MEDROL DOSEPACK) 4 MG TABLET    follow package directions       Start Date: 1/3/2023  End Date: --    METOLAZONE (ZAROXOLYN) 5 MG TABLET    Take 1 tablet (5 mg total) by mouth daily as needed.       Start Date: 1/28/2019 End Date: --    METOPROLOL SUCCINATE (TOPROL-XL) 25 MG 24 HR TABLET    Take 25 mg by mouth.       Start Date: 7/1/2020  End Date: --    NITROGLYCERIN (NITROSTAT) 0.4 MG SL TABLET    Place 0.4 mg under the tongue every 5 (five) minutes as needed for Chest pain.       Start Date: --        End Date: --    PEN NEEDLE, DIABETIC (PEN NEEDLE) 31 GAUGE X 5/16" NDLE    USE ONE SYRINGE EVERY DAY       Start Date: 1/8/2018  End Date: --    PHENAZOPYRIDINE HCL (AZO ORAL)    Take by mouth as needed.       Start Date: --        End Date: --    POTASSIUM CHLORIDE (MICRO-K) 10 MEQ CPSR    Take 2 capsules (20 mEq total) by mouth 3 (three) times daily.       Start Date: 7/20/2022 End Date: --    PRADAXA 75 MG CAP    2 (two) times a day.       Start Date: 1/12/2021 " End Date: --    SERTRALINE (ZOLOFT) 50 MG TABLET    Take 1 tablet (50 mg total) by mouth once daily.       Start Date: 7/21/2022 End Date: --    SILDENAFIL (VIAGRA) 100 MG TABLET    Take 50 mg by mouth.       Start Date: 2/27/2018 End Date: --    SYNTHROID 175 MCG TABLET    Take 1 tablet by mouth once daily at 6am.       Start Date: 4/26/2022 End Date: --    TAMSULOSIN (FLOMAX) 0.4 MG CAP           Start Date: 6/17/2022 End Date: --                   A letter will be written for him supporting his mental disorder being more likely than not associated with his sleeping disorder.                    Answers submitted by the patient for this visit:  Review of Systems Questionnaire (Submitted on 11/13/2023)  activity change: Yes  unexpected weight change: No  rhinorrhea: No  trouble swallowing: No  visual disturbance: No  chest tightness: Yes  polyuria: No  difficulty urinating: No  joint swelling: No  arthralgias: No  confusion: No  dysphoric mood: No

## 2023-11-15 ENCOUNTER — PATIENT MESSAGE (OUTPATIENT)
Dept: INTERNAL MEDICINE | Facility: CLINIC | Age: 73
End: 2023-11-15
Payer: COMMERCIAL

## 2023-11-17 ENCOUNTER — PATIENT MESSAGE (OUTPATIENT)
Dept: FAMILY MEDICINE | Facility: CLINIC | Age: 73
End: 2023-11-17
Payer: COMMERCIAL

## 2023-11-20 ENCOUNTER — PATIENT MESSAGE (OUTPATIENT)
Dept: FAMILY MEDICINE | Facility: CLINIC | Age: 73
End: 2023-11-20
Payer: COMMERCIAL

## 2023-11-21 VITALS — SYSTOLIC BLOOD PRESSURE: 105 MMHG | DIASTOLIC BLOOD PRESSURE: 60 MMHG

## 2023-11-21 NOTE — TELEPHONE ENCOUNTER
Please document his bp.   Alfredo OCHOA Staff (supporting You)6 hours ago (1:49 AM)       105/60       You  Alfredo Brennan17 hours ago (3:43 PM)       Alfredo, can you give me your last blood pressure for documentation?

## 2023-11-22 DIAGNOSIS — E11.9 TYPE 2 DIABETES MELLITUS WITHOUT COMPLICATION: ICD-10-CM

## 2023-11-28 ENCOUNTER — TELEPHONE (OUTPATIENT)
Dept: FAMILY MEDICINE | Facility: CLINIC | Age: 73
End: 2023-11-28
Payer: COMMERCIAL

## 2023-11-28 NOTE — TELEPHONE ENCOUNTER
----- Message from Amber Charles sent at 11/28/2023  4:26 PM CST -----  Regarding: HANNAH  Name of Who is Calling: Vianey SALAS           What is the request in detail: Vianey is the case management case for patient if she is ever needed.            Can the clinic reply by [facility]: No           What Number to [facility] Back [facility]: [phone]

## 2023-12-01 ENCOUNTER — TELEPHONE (OUTPATIENT)
Dept: FAMILY MEDICINE | Facility: CLINIC | Age: 73
End: 2023-12-01
Payer: COMMERCIAL

## 2023-12-01 NOTE — TELEPHONE ENCOUNTER
----- Message from Renée GINO Venegas sent at 12/1/2023  3:12 PM CST -----  Contact: Sena/JOSUE Shetty needs call back at [phone], regards to the patient has been enroll in the case management program and she lost in touch with the patient and she  is closing out his case. She stated that no call back is needed  and the patient have her contact information to reinroll.    Thanks  Td

## 2023-12-29 ENCOUNTER — TELEPHONE (OUTPATIENT)
Dept: FAMILY MEDICINE | Facility: CLINIC | Age: 73
End: 2023-12-29
Payer: COMMERCIAL

## 2023-12-29 NOTE — TELEPHONE ENCOUNTER
----- Message from April Zavala sent at 12/29/2023  1:11 PM CST -----  Contact: Evy Ratliff called for advice for the pt. He has shingles and its affecting his eye. Please call her back at [phone].    Thanks  TS

## 2023-12-29 NOTE — TELEPHONE ENCOUNTER
----- Message from Jennifer Gallo sent at 12/29/2023  2:25 PM CST -----  Contact: Evy/wife  .Type:  Patient Returning Call    Who Called:Evy  Who Left Message for Patient:Nurse  Does the patient know what this is regarding?:Yes  Would the patient rather a call back or a response via [facility]? Call back   Best Call Back Number:[phone]  Additional Information: NA                Thanks  KT

## 2023-12-29 NOTE — TELEPHONE ENCOUNTER
----- Message from Elke Malik MD sent at 12/29/2023  2:52 PM CST -----  Contact: Evy/wife  If his eyes are now affected, I would recommend him going to the ER for evaluation. They will be able to call in an eye specialist to see him urgently  ----- Message -----  From: Nash Jerez LPN  Sent: 12/29/2023   2:46 PM CST  To: Elke Malik MD    Pt's wife called stating he was recently diagnosed with Shingles at an urgent care earlier this week. Currently a day and a half into the Acyclovir. Pt's wife stated the pt has it now spreading into both eyes, starting too affect his vision one of them is very swollen with drainage and is blood shot red and the other eye is now starting to swell. Advised if gets worse or affects breathing, etc to please go to the ER. Pt's wife verbalized understanding and requesting call back first from us if possible on how to proceed. Thank you.     ----- Message -----  From: Jennifer Gallo  Sent: 12/29/2023   2:26 PM CST  To: Darlene OCHOA Staff    .Type:  Patient Returning Call    Who Called:Evy  Who Left Message for Patient:Nurse  Does the patient know what this is regarding?:Yes  Would the patient rather a call back or a response via [facility]? Call back   Best Call Back Number:[phone]  Additional Information: NA                Thanks  KT

## 2023-12-29 NOTE — TELEPHONE ENCOUNTER
Spoke with pt's wife and advised per provider that pt go to ER for further evaluation. Pt's wife verbalized understanding. Stating they will go there shortly.

## 2023-12-29 NOTE — TELEPHONE ENCOUNTER
\    ----- Message from Jennifer Gallo sent at 12/29/2023  2:25 PM CST -----  Contact: Evy/wife  .Type:  Patient Returning Call    Who Called:Evy  Who Left Message for Patient:Nurse  Does the patient know what this is regarding?:Yes  Would the patient rather a call back or a response via [facility]? Call back   Best Call Back Number:[phone]  Additional Information: NA                Thanks  KT

## 2023-12-29 NOTE — TELEPHONE ENCOUNTER
----- Message from Elke Malik MD sent at 12/29/2023  2:52 PM CST -----  Contact: Evy/wife  If his eyes are now affected, I would recommend him going to the ER for evaluation. They will be able to call in an eye specialist to see him urgently  ----- Message -----  From: Nash Jerez LPN  Sent: 12/29/2023   2:46 PM CST  To: Elke Malik MD    Pt's wife called stating he was recently diagnosed with Shingles at an urgent care earlier this week. Currently a day and a half into the Acyclovir. Pt's wife stated the pt has it now spreading into both eyes, starting too affect his vision one of them is very swollen with drainage and is blood shot red and the other eye is now starting to swell. Advised if gets worse or affects breathing, etc to please go to the ER. Pt's wife verbalized understanding and requesting call back first from us if possible on how to proceed. Thank you.     ----- Message -----  From: Jennifer Gallo  Sent: 12/29/2023   2:26 PM CST  To: Darlene OCHOA Staff    .Type:  Patient Returning Call    Who Called:Evy  Who Left Message for Patient:Nurse  Does the patient know what this is regarding?:Yes  Would the patient rather a call back or a response via [facility]? Call back   Best Call Back Number:[phone]  Additional Information: NA                Thanks  KT

## 2024-01-03 DIAGNOSIS — Z71.89 COMPLEX CARE COORDINATION: ICD-10-CM

## 2024-01-04 ENCOUNTER — PATIENT MESSAGE (OUTPATIENT)
Dept: ADMINISTRATIVE | Facility: HOSPITAL | Age: 74
End: 2024-01-04
Payer: COMMERCIAL

## 2024-01-11 DIAGNOSIS — Z00.00 ENCOUNTER FOR MEDICARE ANNUAL WELLNESS EXAM: ICD-10-CM

## 2024-04-04 ENCOUNTER — PATIENT MESSAGE (OUTPATIENT)
Dept: ADMINISTRATIVE | Facility: HOSPITAL | Age: 74
End: 2024-04-04
Payer: COMMERCIAL

## 2024-04-10 ENCOUNTER — PATIENT OUTREACH (OUTPATIENT)
Dept: ADMINISTRATIVE | Facility: HOSPITAL | Age: 74
End: 2024-04-10
Payer: COMMERCIAL

## 2024-04-10 ENCOUNTER — PATIENT MESSAGE (OUTPATIENT)
Dept: ADMINISTRATIVE | Facility: HOSPITAL | Age: 74
End: 2024-04-10
Payer: COMMERCIAL

## 2024-04-10 NOTE — PROGRESS NOTES
Spoke with Pt & wife who were in car on speaker, Pt declines appts & colonoscopy at this time, wife informs [facility]. Asked if they can have records sent over since they inform they are keeping current PCP but just [facility] [place] due to insurance purposes. Wife will send records, unable to update or link [facility]where, Pt not found. Delayed msg sent to Portal as a reminder to send records.     VBHM Score: 5     Colon Cancer Screening  Urine Screening  Hemoglobin A1c  Foot Exam  Uncontrolled BP    Shingles/Zoster Vaccine  RSV Vaccine                  Health Maintenance Topic(s) Outreach Outcomes & Actions Taken:         Additional Notes:                 Care Management, Digital Medicine, and/or Education Referrals    OPCM Risk Score: 63         Next Steps - Referral Actions: Not at this time        Additional Notes:

## 2024-10-24 NOTE — TELEPHONE ENCOUNTER
----- Message from Marti Chance sent at 7/18/2022  3:08 PM CDT -----  Regarding: Memory Issues  Contact: Patient's wife- Evy  Please call patient's wife concerning working patient in to see Dr Colon only for his memory issues. The next available was 09/21/22. Please call to schedule at  [phone] ( Evy)    
Booked with patient.     
First available for me to book 8/8 patients wife feels he needs to be seen sooner due to memory loss, they only want to see you.  Please advise.     
This Wednesday at noon.  
done
This is a surgical and/or non-medical patient.